# Patient Record
Sex: MALE | Race: WHITE | Employment: FULL TIME | URBAN - METROPOLITAN AREA
[De-identification: names, ages, dates, MRNs, and addresses within clinical notes are randomized per-mention and may not be internally consistent; named-entity substitution may affect disease eponyms.]

---

## 2017-01-11 ENCOUNTER — APPOINTMENT (OUTPATIENT)
Dept: GENERAL RADIOLOGY | Age: 57
End: 2017-01-11
Attending: PHYSICIAN ASSISTANT
Payer: COMMERCIAL

## 2017-01-11 ENCOUNTER — HOSPITAL ENCOUNTER (EMERGENCY)
Age: 57
Discharge: HOME | End: 2017-01-11
Attending: EMERGENCY MEDICINE
Payer: COMMERCIAL

## 2017-01-11 VITALS
HEART RATE: 88 BPM | TEMPERATURE: 98.4 F | SYSTOLIC BLOOD PRESSURE: 137 MMHG | RESPIRATION RATE: 17 BRPM | DIASTOLIC BLOOD PRESSURE: 74 MMHG | WEIGHT: 273 LBS | BODY MASS INDEX: 36.98 KG/M2 | OXYGEN SATURATION: 97 % | HEIGHT: 72 IN

## 2017-01-11 DIAGNOSIS — R07.81 RIB PAIN ON LEFT SIDE: Primary | ICD-10-CM

## 2017-01-11 LAB
ATRIAL RATE: 83 BPM
CALCULATED P AXIS, ECG09: 21 DEGREES
CALCULATED R AXIS, ECG10: -21 DEGREES
CALCULATED T AXIS, ECG11: 73 DEGREES
DIAGNOSIS, 93000: NORMAL
P-R INTERVAL, ECG05: 154 MS
Q-T INTERVAL, ECG07: 372 MS
QRS DURATION, ECG06: 94 MS
QTC CALCULATION (BEZET), ECG08: 437 MS
TROPONIN I BLD-MCNC: <0.04 NG/ML (ref 0–0.08)
VENTRICULAR RATE, ECG03: 83 BPM

## 2017-01-11 PROCEDURE — 71020 XR CHEST PA LAT: CPT

## 2017-01-11 PROCEDURE — 93005 ELECTROCARDIOGRAM TRACING: CPT

## 2017-01-11 PROCEDURE — 99283 EMERGENCY DEPT VISIT LOW MDM: CPT

## 2017-01-11 PROCEDURE — 84484 ASSAY OF TROPONIN QUANT: CPT

## 2017-01-11 RX ORDER — HYDROCODONE BITARTRATE AND ACETAMINOPHEN 5; 325 MG/1; MG/1
1 TABLET ORAL
Qty: 15 TAB | Refills: 0 | Status: SHIPPED | OUTPATIENT
Start: 2017-01-11 | End: 2017-03-02 | Stop reason: ALTCHOICE

## 2017-01-11 NOTE — ED NOTES
\"I feel pretty good as long as I don't take a deep breath or move too much. \" The patient was discharged home by Max Lopes, Henri Muna Ding in stable condition. The patient is alert and oriented, in no respiratory distress and discharge vital signs obtained. The patient's diagnosis, condition and treatment were explained. The patient expressed understanding. Prescriptions given. A discharge plan has been developed. A  was not involved in the process. Aftercare instructions were given. Pt ambulatory out of the ED.

## 2017-01-11 NOTE — ED PROVIDER NOTES
HPI Comments: 49 y.o. male with past medical history significant for HTN, arthritis, dyslipidemia, and DM presents with complaints of left rib pain. The pt states that he was dragging a dead deer out of the woods \"when I twisted my left side. \"  The pt states that nothing makes the rib pain better or worse. The pt rates the pain as a 6/10 in severity. There is no radiation of the pain. The pt describes the pain as sharp and constant. The pt denies states that he has been taking aleve at home for the pain with minimal relief of symptoms. There are no other acute medical complaints at this time. Denies fever, chills, HA, dizziness, light headedness, dyspnea, chest pain, abdominal pain, N/V/D, melena, hematochezia, loss of bowel/bladder functioning, saddle anesthesia, urinary symptoms or any other acute medical conditions. PCP: MD Perla Rendon PA-C      Patient is a 49 y.o. male presenting with rib pain. Rib Pain   Pertinent negatives include no fever, no rhinorrhea, no sore throat, no ear pain, no cough, no wheezing, no chest pain, no vomiting, no abdominal pain and no rash. Past Medical History:   Diagnosis Date    Arthritis     Chronic pain      LOWER BACK    Deviated septum     Diabetes (HCC)     GERD (gastroesophageal reflux disease)     High cholesterol     Hypertension     Unspecified sleep apnea      NO C-PAP       Past Surgical History:   Procedure Laterality Date    Hx gi       COLONOSCOPY    Hx appendectomy      Hx other surgical       WISDOM TEETH EXTRACTION    Hx other surgical       EXPLORATORY LAP    Hx heent       Tonsils and uvula removed. Family History:   Problem Relation Age of Onset    Heart Disease Mother     Dementia Mother      ALZHEIMER'S       Social History     Social History    Marital status: SINGLE     Spouse name: N/A    Number of children: N/A    Years of education: N/A     Occupational History    Not on file.      Social History Main Topics    Smoking status: Current Every Day Smoker     Packs/day: 1.00     Years: 21.00    Smokeless tobacco: Never Used    Alcohol use 1.5 oz/week     3 Cans of beer per week    Drug use: No    Sexual activity: Not on file     Other Topics Concern    Not on file     Social History Narrative         ALLERGIES: Vioxx [rofecoxib]    Review of Systems   Constitutional: Negative for activity change, appetite change, diaphoresis and fever. HENT: Negative for ear discharge, ear pain, facial swelling, rhinorrhea, sore throat, tinnitus, trouble swallowing and voice change. Eyes: Negative for photophobia, pain, discharge, redness and visual disturbance. Respiratory: Negative for cough, chest tightness, shortness of breath, wheezing and stridor. Cardiovascular: Negative for chest pain and palpitations. Gastrointestinal: Negative for abdominal pain, constipation, diarrhea, nausea and vomiting. Endocrine: Negative for polydipsia and polyuria. Genitourinary: Negative for dysuria, flank pain and hematuria. Musculoskeletal: Positive for myalgias. Negative for arthralgias and back pain. Skin: Negative for color change and rash. Neurological: Negative for dizziness, syncope, speech difficulty, light-headedness and numbness. Psychiatric/Behavioral: Negative for behavioral problems. Vitals:    01/11/17 1445   BP: (!) 141/92   Pulse: 98   Resp: 16   Temp: 98.4 °F (36.9 °C)   SpO2: 96%   Weight: 123.8 kg (273 lb)   Height: 6' (1.829 m)            Physical Exam   Constitutional: He is oriented to person, place, and time. He appears well-developed and well-nourished. No distress. HENT:   Head: Normocephalic and atraumatic. Eyes: Conjunctivae are normal. Pupils are equal, round, and reactive to light. Neck: Normal range of motion. Neck supple. Cardiovascular: Normal rate, regular rhythm and normal heart sounds.     Pulmonary/Chest: Effort normal and breath sounds normal. No respiratory distress. He has no wheezes. No Stridors or wheezes. No rales or rhonci. No accessory muscle usage. No nasal flaring. Musculoskeletal: Normal range of motion. He exhibits tenderness. There is left sided rib tenderness. No palpable deformity. Neurological: He is alert and oriented to person, place, and time. Skin: Skin is warm. He is not diaphoretic. MDM  Number of Diagnoses or Management Options  Rib pain on left side:   Diagnosis management comments: Pt presents with left sided rib pain. Pt with several cardiac risk factors. Trop and and EKG were unremarkable. Imaging studies revealed left sided pulmonary nodule. I explained to the pt that he needs to follow up with [facility] for further evaluation including CT of the nodule. Conservative treatment recommended for the pain. Reviewed treatment plan with attending and they agree. [facility]      ED Course       Procedures      ED EKG interpretation:  Rhythm: normal sinus rhythm; and regular . Rate (approx.): 83; Axis: normal; P wave: normal; QRS interval: normal ; ST/T wave: normal;  This EKG was interpreted by Amadou Guido PA-C,ED Provider.

## 2017-01-11 NOTE — DISCHARGE INSTRUCTIONS
Musculoskeletal Chest Pain: Care Instructions  Your Care Instructions  Chest pain is not always a sign that something is wrong with your heart or that you have another serious problem. The doctor thinks your chest pain is caused by strained muscles or ligaments, inflamed chest cartilage, or another problem in your chest, rather than by your heart. You may need more tests to find the cause of your chest pain. Follow-up care is a key part of your treatment and safety. Be sure to make and go to all appointments, and call your doctor if you are having problems. Its also a good idea to know your test results and keep a list of the medicines you take. How can you care for yourself at home? · Take pain medicines exactly as directed. ¨ If the doctor gave you a prescription medicine for pain, take it as prescribed. ¨ If you are not taking a prescription pain medicine, ask your doctor if you can take an over-the-counter medicine. · Rest and protect the sore area. · Stop, change, or take a break from any activity that may be causing your pain or soreness. · Put ice or a cold pack on the sore area for 10 to 20 minutes at a time. Try to do this every 1 to 2 hours for the next 3 days (when you are awake) or until the swelling goes down. Put a thin cloth between the ice and your skin. · After 2 or 3 days, apply a heating pad set on low or a warm cloth to the area that hurts. Some doctors suggest that you go back and forth between hot and cold. · Do not wrap or tape your ribs for support. This may cause you to take smaller breaths, which could increase your risk of lung problems. · Mentholated creams such as Bengay or Icy Hot may soothe sore muscles. Follow the instructions on the package. · Follow your doctor's instructions for exercising. · Gentle stretching and massage may help you get better faster. Stretch slowly to the point just before pain begins, and hold the stretch for at least 15 to 30 seconds.  Do this 3 or 4 times a day. Stretch just after you have applied heat. · As your pain gets better, slowly return to your normal activities. Any increased pain may be a sign that you need to rest a while longer. When should you call for help? Call 911 anytime you think you may need emergency care. For example, call if:  · You have chest pain or pressure. This may occur with:  ¨ Sweating. ¨ Shortness of breath. ¨ Nausea or vomiting. ¨ Pain that spreads from the chest to the neck, jaw, or one or both shoulders or arms. ¨ Dizziness or lightheadedness. ¨ A fast or uneven pulse. After calling 911, chew 1 adult-strength aspirin. Wait for an ambulance. Do not try to drive yourself. · You have sudden chest pain and shortness of breath, or you cough up blood. Call your doctor now or seek immediate medical care if:  · You have any trouble breathing. · Your chest pain gets worse. · Your chest pain occurs consistently with exercise and is relieved by rest.  Watch closely for changes in your health, and be sure to contact your doctor if:  · Your chest pain does not get better after 1 week. Where can you learn more? Go to http://laney-leidy.info/. Enter V293 in the search box to learn more about \"Musculoskeletal Chest Pain: Care Instructions. \"  Current as of: May 27, 2016  Content Version: 11.1  © 0197-8172 [employer]. Care instructions adapted under license by [employer] (which disclaims liability or warranty for this information). If you have questions about a medical condition or this instruction, always ask your healthcare professional. [place] [address] any warranty or liability for your use of this information. We hope that we have addressed all of your medical concerns. The examination and treatment you received in the Emergency Department were for an emergent problem and were not intended as complete care.  It is important that you follow up with your healthcare provider(s) for ongoing care. If your symptoms worsen or do not improve as expected, and you are unable to reach your usual health care provider(s), you should return to the Emergency Department. Today's healthcare is undergoing tremendous change, and patient satisfaction surveys are one of the many tools to assess the quality of medical care. You may receive a survey from the [employer] regarding your experience in the Emergency Department. I hope that your experience has been completely positive, particularly the medical care that I provided. As such, please participate in the survey; anything less than excellent does not meet my expectations or intentions. [facility] and [facility] participate in nationally recognized quality of care measures. If your blood pressure is greater than 120/80, as reported below, we urge that you seek medical care to address the potential of high blood pressure, commonly known as hypertension. Hypertension can be hereditary or can be caused by certain medical conditions, pain, stress, or \"white coat syndrome. \"       Please make an appointment with your health care provider(s) for follow up of your Emergency Department visit. VITALS:   Patient Vitals for the past 8 hrs:   Temp Pulse Resp BP SpO2   01/11/17 1445 98.4 °F (36.9 °C) 98 16 (!) 141/92 96 %          Thank you for allowing us to provide you with medical care today. We realize that you have many choices for your emergency care needs. Please choose us in the future for any continued health care needs. Roberto Villegas \[facility] [place] [facility]\"", [address] [place] [address].   Office: [phone]            Recent Results (from the past 24 hour(s))   EKG, 12 LEAD, INITIAL    Collection Time: 01/11/17  3:08 PM   Result Value Ref Range    Ventricular Rate 83 BPM    Atrial Rate 83 BPM    P-R Interval 154 ms    QRS Duration 94 ms    Q-T Interval 372 ms    QTC Calculation (Bezet) 437 ms    Calculated P Axis 21 degrees    Calculated R Axis -21 degrees    Calculated T Axis 73 degrees    Diagnosis       Normal sinus rhythm  Normal ECG  When compared with ECG of 03-JUN-2016 09:43,  No significant change was found     POC TROPONIN-I    Collection Time: 01/11/17  3:27 PM   Result Value Ref Range    Troponin-I (POC) <0.04 0.00 - 0.08 ng/mL       Xr Chest Pa Lat    Result Date: 1/11/2017  EXAM:  XR CHEST PA LAT INDICATION:   LEFT RIB PAIN. Pain on left side of chest since 1/2/17 COMPARISON: Chest x-ray 3/29/2014 and chest x-ray 4/25/2013. FINDINGS: PA and lateral radiographs of the chest demonstrate a small rounded nodular density in the left upper midlung zone and are otherwise clear. The cardiac and mediastinal contours and pulmonary vascularity are normal.  The chest wall structures and visualized upper abdomen show no acute findings with incidental note of degenerative spine and shoulder changes. IMPRESSION: Possible left lung pulmonary nodule. CT evaluation recommended.  23X

## 2017-01-11 NOTE — ED TRIAGE NOTES
Patient was dragging a dead deer on Jan 2nd, bent and twisted his body and felt pain in his left ribcage. 150 lb animal. Had a coughing fit at the time and it worsened the pain. Also, has had a dry cough for 2 months prior. Certain positions and coughing aggravate this pain.

## 2017-01-18 ENCOUNTER — OFFICE VISIT (OUTPATIENT)
Dept: FAMILY MEDICINE CLINIC | Age: 57
End: 2017-01-18

## 2017-01-18 VITALS
WEIGHT: 270 LBS | TEMPERATURE: 98 F | OXYGEN SATURATION: 95 % | HEIGHT: 72 IN | DIASTOLIC BLOOD PRESSURE: 78 MMHG | HEART RATE: 88 BPM | SYSTOLIC BLOOD PRESSURE: 130 MMHG | BODY MASS INDEX: 36.57 KG/M2 | RESPIRATION RATE: 16 BRPM

## 2017-01-18 DIAGNOSIS — Z76.89 ENCOUNTER TO ESTABLISH CARE WITH NEW DOCTOR: ICD-10-CM

## 2017-01-18 DIAGNOSIS — G47.33 OBSTRUCTIVE SLEEP APNEA: ICD-10-CM

## 2017-01-18 DIAGNOSIS — G47.00 INSOMNIA, UNSPECIFIED TYPE: ICD-10-CM

## 2017-01-18 DIAGNOSIS — R93.89 ABNORMAL CHEST X-RAY: Primary | ICD-10-CM

## 2017-01-18 NOTE — MR AVS SNAPSHOT
Visit Information Date & Time Provider Department Dept. Phone Encounter #  
 1/18/2017  3:30 PM Juan Reddy Matheus [phone] [id number] Follow-up Instructions Return if symptoms worsen or fail to improve. Upcoming Health Maintenance Date Due Hepatitis C Screening 1960 Pneumococcal 19-64 Medium Risk (1 of 1 - PPSV23) 7/17/1981 DTaP/Tdap/Td series (1 - Tdap) 7/17/1983 FOBT Q 1 YEAR AGE 50-75 7/17/2012 INFLUENZA AGE 9 TO ADULT 8/1/2016 Allergies as of 1/18/2017  Review Complete On: 1/18/2017 By: Denys Acosta NP Severity Noted Reaction Type Reactions Vioxx [Rofecoxib]  04/25/2013    Swelling, Other (comments) SWELLING OF UVULA, DIFFICULTY BREATHING Current Immunizations  Never Reviewed No immunizations on file. Not reviewed this visit You Were Diagnosed With   
  
 Codes Comments Abnormal chest x-ray    -  Primary ICD-10-CM: R93.8 ICD-9-CM: 793.2 Insomnia, unspecified type     ICD-10-CM: G47.00 ICD-9-CM: 780.52 Obstructive sleep apnea     ICD-10-CM: G47.33 
ICD-9-CM: 327.23 Encounter to establish care with new doctor     ICD-10-CM: Z71.89 ICD-9-CM: V65.8 Vitals BP Pulse Temp Resp Height(growth percentile) Weight(growth percentile) 130/78 88 98 °F (36.7 °C) (Oral) 16 6' (1.829 m) 270 lb (122.5 kg) SpO2 BMI Smoking Status 95% 36.62 kg/m2 Current Every Day Smoker BMI and BSA Data Body Mass Index Body Surface Area  
 36.62 kg/m 2 2.49 m 2 Preferred Pharmacy Pharmacy Name Phone [id number] [phone] , [address] [place] [address]  [phone] Your Updated Medication List  
  
   
This list is accurate as of: 1/18/17  3:52 PM.  Always use your most recent med list.  
  
  
  
  
 CRESTOR 10 mg tablet Generic drug:  rosuvastatin Take 10 mg by mouth daily. DIOVAN 160 mg tablet Generic drug:  valsartan Take 160 mg by mouth daily. HYDROcodone-acetaminophen 5-325 mg per tablet Commonly known as:  Stephanie Mura Take 1 Tab by mouth every four (4) hours as needed for Pain. Max Daily Amount: 6 Tabs. lansoprazole 30 mg capsule Commonly known as:  PREVACID Take 30 mg by mouth Daily (before breakfast). metFORMIN 500 mg tablet Commonly known as:  GLUCOPHAGE Take  by mouth two (2) times daily (with meals). We Performed the Following REFERRAL TO SLEEP STUDIES [REF99 Custom] Follow-up Instructions Return if symptoms worsen or fail to improve. To-Do List   
 01/26/2017 Imaging:  CT CHEST W WO CONT Referral Information Referral ID Referred By Referred To  
  
 [id number] YUKO, [address] [facility], [address] [place] [address] [facility] Cydney Richards 33 Phone: [phone] Fax: [phone] Visits Status Start Date End Date 1 New Request 1/18/17 1/18/18 If your referral has a status of pending review or denied, additional information will be sent to support the outcome of this decision. Referral ID Referred By Referred To  
 [id number] STEPHANIE HUNTLEY Not Available Visits Status Start Date End Date 1 New Request 1/18/17 1/18/18 If your referral has a status of pending review or denied, additional information will be sent to support the outcome of this decision. Patient Instructions CT Scan of the Chest: About This Test 
What is it? A CT (computed tomography) scan uses X-rays to make detailed pictures of your body and structures inside your body. A CT scan of the chest can give your doctor information about your lungs, your heart, and other structures in your chest. 
During the test, you will lie on a table that is attached to the CT scanner. The CT scanner is a large doughnut-shaped machine. Why is this test done? A CT scan of the chest can help find problems such as infection, lung cancer, blocked blood flow in the lung (pulmonary embolism), and other lung problems. It also can be used to see if cancer has spread into the chest from another area of the body. How can you prepare for the test? 
Talk to your doctor about all your health conditions before the test. For example, tell your doctor if: 
· You are or might be pregnant. · You are allergic to any medicines. · You have diabetes. · You take metformin. · You are breastfeeding. · You get nervous in confined spaces. You may need medicine to help you relax. What happens before the test? 
· You may have to take off jewelry. · You will take off all or most of your clothes and change into a gown. If you do leave some clothes on, make sure you take everything out of your pockets. · You may have contrast material (dye) put into your arm through a tube called an IV. Contrast material helps doctors see specific organs, blood vessels, and most tumors. What happens during the test? 
· You will lie on a table that is attached to the CT scanner. · The table will slide into the round opening of the scanner. The table will move during the scan. The scanner moves inside the doughnut-shaped casing around your body. · You will be asked to hold still during the scan. You may be asked to hold your breath for short periods. · You may be alone in the scanning room, but a technologist will be watching you through a window and talking with you during the test. 
What else should you know about the test? 
· A CT scan does not hurt. · If a dye is used, you may feel a quick sting or pinch when the IV is started. The dye may make you feel warm and flushed and give you a metallic taste in your mouth. Some people feel sick to their stomach or get a headache.  
· If you breastfeed and are concerned about whether the dye used in this test is safe, talk to your doctor. Most experts believe that very little dye passes into breast milk and even less is passed on to the baby. But if you prefer, you can store some of your breast milk ahead of time and use it for a day or two after the test. 
· There is a small chance of getting cancer from some types of CT scans. The risk is higher in children, young adults, and people who have many radiation tests. If you are concerned about this risk, talk to your doctor about the benefits and risks of a CT scan and confirm that the test is needed. How long does the test take? · The test will take about 30 to 60 minutes. Most of this time is spent getting ready for the scan. The actual test only takes a few minutes. What happens after the test? 
· You will probably be able to go home right away. · You can go back to your usual activities right away. · Drink plenty of fluids for 24 hours after the test if dye was used, unless your doctor tells you not to. When should you call for help? Watch closely for changes in your health, and be sure to contact your doctor if you have any problems. Follow-up care is a key part of your treatment and safety. Be sure to make and go to all appointments, and call your doctor if you are having problems. It's also a good idea to keep a list of the medicines you take. Ask your doctor when you can expect to have your test results. Where can you learn more? Go to http://laney-leidy.info/. Enter G007 in the search box to learn more about \"CT Scan of the Chest: About This Test.\" Current as of: February 19, 2016 Content Version: 11.1 © 3429-8762 [employer]. Care instructions adapted under license by [employer] (which disclaims liability or warranty for this information).  If you have questions about a medical condition or this instruction, always ask your healthcare professional. Antionette Stevens[employer] disclaims any warranty or liability for your use of this information. Introducing [facility]! [employer] introduces Virtual [employer]ground patient portal. Now you can access parts of your medical record, email your doctor's office, and request medication refills online. 1. In your internet browser, go to [email or web address] 2. Click on the First Time User? Click Here link in the Sign In box. You will see the New Member Sign Up page. 3. Enter your Virtual Fairground Access Code exactly as it appears below. You will not need to use this code after youve completed the sign-up process. If you do not sign up before the expiration date, you must request a new code. · Virtual [employer]ground Access Code: [email or web address] Expires: 4/11/2017  2:38 PM 
 
4. Enter the last four digits of your Social Security Number (xxxx) and Date of Birth (mm/dd/yyyy) as indicated and click Submit. You will be taken to the next sign-up page. 5. Create a Virtual [employer]ground ID. This will be your [employer]ground login ID and cannot be changed, so think of one that is secure and easy to remember. 6. Create a [employer]ground password. You can change your password at any time. 7. Enter your Password Reset Question and Answer. This can be used at a later time if you forget your password. 8. Enter your e-mail address. You will receive e-mail notification when new information is available in [address] 9. Click Sign Up. You can now view and download portions of your medical record. 10. Click the Download Summary menu link to download a portable copy of your medical information. If you have questions, please visit the Frequently Asked Questions section of the Virtual [employer]ground website. Remember, Virtual [employer]ground is NOT to be used for urgent needs. For medical emergencies, dial 911. Now available from your iPhone and Android! Please provide this summary of care documentation to your next provider. Your primary care clinician is listed as Светлана Patel. If you have any questions after today's visit, please call [phone].

## 2017-01-18 NOTE — PATIENT INSTRUCTIONS
CT Scan of the Chest: About This Test  What is it? A CT (computed tomography) scan uses X-rays to make detailed pictures of your body and structures inside your body. A CT scan of the chest can give your doctor information about your lungs, your heart, and other structures in your chest.  During the test, you will lie on a table that is attached to the CT scanner. The CT scanner is a large doughnut-shaped machine. Why is this test done? A CT scan of the chest can help find problems such as infection, lung cancer, blocked blood flow in the lung (pulmonary embolism), and other lung problems. It also can be used to see if cancer has spread into the chest from another area of the body. How can you prepare for the test?  Talk to your doctor about all your health conditions before the test. For example, tell your doctor if:  · You are or might be pregnant. · You are allergic to any medicines. · You have diabetes. · You take metformin. · You are breastfeeding. · You get nervous in confined spaces. You may need medicine to help you relax. What happens before the test?  · You may have to take off jewelry. · You will take off all or most of your clothes and change into a gown. If you do leave some clothes on, make sure you take everything out of your pockets. · You may have contrast material (dye) put into your arm through a tube called an IV. Contrast material helps doctors see specific organs, blood vessels, and most tumors. What happens during the test?  · You will lie on a table that is attached to the CT scanner. · The table will slide into the round opening of the scanner. The table will move during the scan. The scanner moves inside the doughnut-shaped casing around your body. · You will be asked to hold still during the scan. You may be asked to hold your breath for short periods.   · You may be alone in the scanning room, but a technologist will be watching you through a window and talking with you during the test.  What else should you know about the test?  · A CT scan does not hurt. · If a dye is used, you may feel a quick sting or pinch when the IV is started. The dye may make you feel warm and flushed and give you a metallic taste in your mouth. Some people feel sick to their stomach or get a headache. · If you breastfeed and are concerned about whether the dye used in this test is safe, talk to your doctor. Most experts believe that very little dye passes into breast milk and even less is passed on to the baby. But if you prefer, you can store some of your breast milk ahead of time and use it for a day or two after the test.  · There is a small chance of getting cancer from some types of CT scans. The risk is higher in children, young adults, and people who have many radiation tests. If you are concerned about this risk, talk to your doctor about the benefits and risks of a CT scan and confirm that the test is needed. How long does the test take? · The test will take about 30 to 60 minutes. Most of this time is spent getting ready for the scan. The actual test only takes a few minutes. What happens after the test?  · You will probably be able to go home right away. · You can go back to your usual activities right away. · Drink plenty of fluids for 24 hours after the test if dye was used, unless your doctor tells you not to. When should you call for help? Watch closely for changes in your health, and be sure to contact your doctor if you have any problems. Follow-up care is a key part of your treatment and safety. Be sure to make and go to all appointments, and call your doctor if you are having problems. It's also a good idea to keep a list of the medicines you take. Ask your doctor when you can expect to have your test results. Where can you learn more? Go to http://laney-leidy.info/.   Enter E357 in the search box to learn more about \"CT Scan of the Chest: About This Test.\"  Current as of: February 19, 2016  Content Version: 11.1  © 2951-9432 [facility]. Care instructions adapted under license by [employer] (which disclaims liability or warranty for this information). If you have questions about a medical condition or this instruction, always ask your healthcare professional. [place] [address] any warranty or liability for your use of this information.

## 2017-01-18 NOTE — PROGRESS NOTES
Identified pt with two pt identifiers(name and ). Chief Complaint   Patient presents with   [facility] Follow Up     [facility] 17      Pt presents today as a new patient. pt old PCP is Dr. Kin Romero  Pt was seen in ER due rib injury on 17, xray states possible lung nodule & CT recommended per ER MD.      Health Maintenance Due   Topic    Hepatitis C Screening     Pneumococcal 19-64 Medium Risk (1 of 1 - PPSV23)    DTaP/Tdap/Td series (1 - Tdap)    FOBT Q 1 YEAR AGE 50-75     INFLUENZA AGE 9 TO ADULT        Wt Readings from Last 3 Encounters:   17 270 lb (122.5 kg)   17 273 lb (123.8 kg)   16 271 lb 9 oz (123.2 kg)     Temp Readings from Last 3 Encounters:   17 98 °F (36.7 °C) (Oral)   17 98.4 °F (36.9 °C)   16 97.9 °F (36.6 °C)     BP Readings from Last 3 Encounters:   17 130/78   17 137/74   16 122/64     Pulse Readings from Last 3 Encounters:   17 88   17 88   16 73       Learning Assessment 2017   PRIMARY LEARNER Patient   BARRIERS PRIMARY LEARNER NONE   CO-LEARNER CAREGIVER No   PRIMARY LANGUAGE ENGLISH   LEARNER PREFERENCE PRIMARY DEMONSTRATION     LISTENING   ANSWERED BY patient   RELATIONSHIP SELF         Depression Screening:  :     PHQ 2 / 9, over the last two weeks 2017   Little interest or pleasure in doing things Not at all   Feeling down, depressed or hopeless Not at all   Total Score PHQ 2 0           Coordination of Care Questionnaire:  :     1) Have you been to an emergency room, urgent care clinic since your last visit? yes [facility] 17  Hospitalized since your last visit? no             2) Have you seen or consulted any other health care providers outside of [address] [place] [address] since your last visit? no  (Include any pap smears or colon screenings in this section.)    3) Do you have an Advance Directive on file?  no  Are you interested in receiving information about Advance Directives? no    Patient is accompanied by self I have received verbal consent from Leonora Robles to discuss any/all medical information while they are present in the room. Reviewed record in preparation for visit and have obtained necessary documentation. Medication reconciliation up to date and corrected with patient at this time.

## 2017-01-18 NOTE — PROGRESS NOTES
HISTORY OF PRESENT ILLNESS  Jose Andersen is a 49 y.o. male. HPI  Pt presents to [facility] care, ER follow up, rib pain and insomnia\"    Pt went to the ER on 1/11, for rib pain. On chest x-ray, there was a possible lung nodule found. Pt was asked to return to PCP office, for CT order. Pt has never had an abnormal chest x-ray previously, and is an every day smoker. PCP: Dr. Hillary Ramírez, he was at [address]  Pt is getting records sent here. Pt states that he is having a hard time sleeping at night. He will be exhausted, and then will be awake throughout the night. At work, he notices that after he eats, he will get exhausted, and pretty sure that it has something to do with his Diabetes. According to previous PCP, his diabetes is under control, so patient is a little confused as to what is going on? He was diagnosed with sleep apnea in the past, but had removal of tonsils, uvula and fatty tissue, due to this. Pt was not seen for sleep study after, so he is worried that sleep apnea could be part of his issue still? Pt does snore at night. Review of Systems   Constitutional: Negative for fever. HENT: Negative for congestion. Respiratory: Negative for cough. Gastrointestinal: Negative for abdominal pain, diarrhea, nausea and vomiting. Physical Exam   Constitutional: He is oriented to person, place, and time. He appears well-developed and well-nourished. HENT:   Head: Normocephalic and atraumatic. Neck: Normal range of motion. Neck supple. Cardiovascular: Normal rate, regular rhythm and normal heart sounds. Pulmonary/Chest: Effort normal and breath sounds normal.   Neurological: He is alert and oriented to person, place, and time. Skin: Skin is warm and dry. Psychiatric: He has a normal mood and affect. His behavior is normal.       ASSESSMENT and PLAN    ICD-10-CM ICD-9-CM    1. Abnormal chest x-ray R93.8 793.2 CT CHEST W WO CONT   2.  Insomnia, unspecified type G47.00 780.52 REFERRAL TO SLEEP STUDIES   3. Obstructive sleep apnea G47.33 327.23 REFERRAL TO SLEEP STUDIES   4. Encounter to establish care with new doctor [id number] V65.8      Educated that they will notify him within 24 hours for chest CT. Educated that we will notify him when his CT returns, and inform him of any change in plan of care at that time. Due to continued restless sleep, will refer to sleep studies. Will review records when we receive them from previous PCP. Pt informed to return to office with worsening of symptoms, or PRN with any questions or concerns. Pt verbalizes understanding of plan of care and denies further questions or concerns at this time.

## 2017-01-24 ENCOUNTER — HOSPITAL ENCOUNTER (OUTPATIENT)
Dept: CT IMAGING | Age: 57
Discharge: HOME | End: 2017-01-24
Attending: NURSE PRACTITIONER
Payer: COMMERCIAL

## 2017-01-24 DIAGNOSIS — R93.89 ABNORMAL CHEST X-RAY: ICD-10-CM

## 2017-01-24 PROCEDURE — 71250 CT THORAX DX C-: CPT

## 2017-01-25 ENCOUNTER — TELEPHONE (OUTPATIENT)
Dept: FAMILY MEDICINE CLINIC | Age: 57
End: 2017-01-25

## 2017-01-25 NOTE — TELEPHONE ENCOUNTER
----- Message from Mahesh Campbell NP sent at 1/25/2017  7:53 AM EST -----  Please call patient and let him know that his CT returned showing clear lungs. This is reassuring. It also showed fatty liver. This is something we should monitor via labs, etc.  He should return for a physical with fasting labs when he is able, to discuss this more. Thanks!

## 2017-01-25 NOTE — TELEPHONE ENCOUNTER
Advised pt CT results & recommendations per CHAD Burton.   Offered to schedule OV, Pt states he will call office to schedule complete physical with fasting labs

## 2017-03-02 ENCOUNTER — OFFICE VISIT (OUTPATIENT)
Dept: FAMILY MEDICINE CLINIC | Age: 57
End: 2017-03-02

## 2017-03-02 VITALS
SYSTOLIC BLOOD PRESSURE: 118 MMHG | HEART RATE: 93 BPM | RESPIRATION RATE: 16 BRPM | WEIGHT: 270 LBS | HEIGHT: 72 IN | BODY MASS INDEX: 36.57 KG/M2 | DIASTOLIC BLOOD PRESSURE: 72 MMHG | TEMPERATURE: 97.2 F | OXYGEN SATURATION: 96 %

## 2017-03-02 DIAGNOSIS — K76.0 HEPATIC STEATOSIS: Primary | ICD-10-CM

## 2017-03-02 DIAGNOSIS — E11.9 CONTROLLED TYPE 2 DIABETES MELLITUS WITHOUT COMPLICATION, WITHOUT LONG-TERM CURRENT USE OF INSULIN (HCC): ICD-10-CM

## 2017-03-02 DIAGNOSIS — E78.00 PURE HYPERCHOLESTEROLEMIA: ICD-10-CM

## 2017-03-02 DIAGNOSIS — R53.83 FATIGUE, UNSPECIFIED TYPE: ICD-10-CM

## 2017-03-02 PROBLEM — E78.5 HYPERLIPIDEMIA: Status: ACTIVE | Noted: 2017-03-02

## 2017-03-02 RX ORDER — CHOLECALCIFEROL (VITAMIN D3) 125 MCG
220 CAPSULE ORAL AS NEEDED
COMMUNITY
End: 2017-08-29

## 2017-03-02 NOTE — PROGRESS NOTES
Identified pt with two pt identifiers(name and ). Chief Complaint   Patient presents with   [address] [place] [address] Radiology Only     discuss CT scan    Labs     pt is not fasting today        Health Maintenance Due   Topic    Hepatitis C Screening     Pneumococcal 19-64 Medium Risk (1 of 1 - PPSV23)    DTaP/Tdap/Td series (1 - Tdap)    FOBT Q 1 YEAR AGE 50-75     INFLUENZA AGE 9 TO ADULT        Wt Readings from Last 3 Encounters:   17 270 lb (122.5 kg)   17 270 lb (122.5 kg)   17 273 lb (123.8 kg)     Temp Readings from Last 3 Encounters:   17 97.2 °F (36.2 °C) (Oral)   17 98 °F (36.7 °C) (Oral)   17 98.4 °F (36.9 °C)     BP Readings from Last 3 Encounters:   17 118/72   17 130/78   17 137/74     Pulse Readings from Last 3 Encounters:   17 93   17 88   17 88         Learning Assessment:  :     Learning Assessment 2017   PRIMARY LEARNER Patient   BARRIERS PRIMARY LEARNER NONE   CO-LEARNER CAREGIVER No   PRIMARY LANGUAGE ENGLISH   LEARNER PREFERENCE PRIMARY DEMONSTRATION     LISTENING   ANSWERED BY patient   RELATIONSHIP SELF       Depression Screening:  :     PHQ 2 / 9, over the last two weeks 3/2/2017   Little interest or pleasure in doing things Not at all   Feeling down, depressed or hopeless Not at all   Total Score PHQ 2 0           Coordination of Care Questionnaire:  :     1) Have you been to an emergency room, urgent care clinic since your last visit? no   Hospitalized since your last visit? no             2) Have you seen or consulted any other health care providers outside of [address] [place] [address] since your last visit? no  (Include any pap smears or colon screenings in this section.)    3) Do you have an Advance Directive on file?  no  Are you interested in receiving information about Advance Directives? no    Patient is accompanied by self I have received verbal consent from Leonora Robles to discuss any/all medical information while they are present in the room. Reviewed record in preparation for visit and have obtained necessary documentation. Medication reconciliation up to date and corrected with patient at this time.

## 2017-03-02 NOTE — MR AVS SNAPSHOT
Visit Information Date & Time Provider Department Dept. Phone Encounter #  
 3/2/2017  3:30 PM Juan Edward Matheus [phone] [id number] Follow-up Instructions Return if symptoms worsen or fail to improve. Upcoming Health Maintenance Date Due Hepatitis C Screening 1960 Pneumococcal 19-64 Medium Risk (1 of 1 - PPSV23) 7/17/1981 DTaP/Tdap/Td series (1 - Tdap) 7/17/1983 FOBT Q 1 YEAR AGE 50-75 7/17/2012 INFLUENZA AGE 9 TO ADULT 8/1/2016 Allergies as of 3/2/2017  Review Complete On: 3/2/2017 By: Liz Templeton NP Severity Noted Reaction Type Reactions Vioxx [Rofecoxib]  04/25/2013    Swelling, Other (comments) SWELLING OF UVULA, DIFFICULTY BREATHING Current Immunizations  Never Reviewed No immunizations on file. Not reviewed this visit You Were Diagnosed With   
  
 Codes Comments Hepatic steatosis    -  Primary ICD-10-CM: K76.0 ICD-9-CM: 571.8 Pure hypercholesterolemia     ICD-10-CM: E78.00 ICD-9-CM: 272.0 Fatigue, unspecified type     ICD-10-CM: R53.83 ICD-9-CM: 780.79 Controlled type 2 diabetes mellitus without complication, without long-term current use of insulin ([facility].)     ICD-10-CM: E11.9 ICD-9-CM: 250.00 Vitals BP  
  
  
  
  
  
 118/72 BMI and BSA Data Body Mass Index Body Surface Area  
 36.62 kg/m 2 2.49 m 2 Preferred Pharmacy Pharmacy Name Phone [id number] [address] [facility], [address] [place] [address]  [phone] Your Updated Medication List  
  
   
This list is accurate as of: 3/2/17  3:48 PM.  Always use your most recent med list.  
  
  
  
  
 ALEVE 220 mg Cap Generic drug:  naproxen sodium Take  by mouth. CRESTOR 10 mg tablet Generic drug:  rosuvastatin Take 10 mg by mouth daily. DIOVAN 160 mg tablet Generic drug:  valsartan Take 160 mg by mouth daily. lansoprazole 30 mg capsule Commonly known as:  PREVACID Take 30 mg by mouth Daily (before breakfast). metFORMIN 500 mg tablet Commonly known as:  GLUCOPHAGE Take  by mouth two (2) times daily (with meals). We Performed the Following CBC W/O DIFF [88234 CPT(R)] HEMOGLOBIN A1C WITH EAG [43776 CPT(R)] LIPID PANEL [69655 CPT(R)] METABOLIC PANEL, COMPREHENSIVE [60120 CPT(R)] THYROID CASCADE PROFILE [[id number] Custom] Follow-up Instructions Return if symptoms worsen or fail to improve. Patient Instructions Nonalcoholic Steatohepatitis (CLAY): Care Instructions Your Care Instructions Nonalcoholic steatohepatitis (CLAY) is liver inflammation. It is caused by a buildup of fat in the liver. The fat buildup is not caused by drinking alcohol. Because of the inflammation, the liver does not work as well as it should. CLAY is part of a group of liver diseases called nonalcoholic fatty liver disease. In these diseases, fat builds up in the liver and sometimes causes liver damage. This damage can get worse over time. Follow-up care is a key part of your treatment and safety. Be sure to make and go to all appointments, and call your doctor if you are having problems. It's also a good idea to know your test results and keep a list of the medicines you take. How can you care for yourself at home? · Stay at a healthy weight. Or if you need to, slowly get to a healthy weight. · Control your cholesterol. Talk to your doctor about ways to lower your cholesterol, if needed. You might try getting active, taking medicines, and making healthy changes to your diet. · Eat healthy foods. This includes fruits, vegetables, lean meats and dairy, and whole grains. · If you have diabetes, keep your blood sugar at your target level. · Get at least 30 minutes of exercise on most days of the week. Walking is a good choice.  You also may want to do other activities, such as running, swimming, cycling, or playing tennis or team sports. · Limit alcohol, or do not drink. Alcohol can damage the liver and cause health problems. When should you call for help? Call your doctor now or seek immediate medical care if: 
· You have yellowing of the skin or the whites of the eyes. (This is called jaundice.) · You have pain in the upper right part of your belly. Watch closely for changes in your health, and be sure to contact your doctor if: 
· You have swelling in your legs or belly. · Your skin itches. Where can you learn more? Go to http://laney-leidy.info/. Enter V608 in the search box to learn more about \"Nonalcoholic Steatohepatitis (CLAY): Care Instructions. \" Current as of: August 9, 2016 Content Version: 11.1 © 3822-8354 [employer]. Care instructions adapted under license by [employer] (which disclaims liability or warranty for this information). If you have questions about a medical condition or this instruction, always ask your healthcare professional. [place] [address] any warranty or liability for your use of this information. Introducing [facility]! [employer] introduces Cinegif patient portal. Now you can access parts of your medical record, email your doctor's office, and request medication refills online. 1. In your internet browser, go to [email or web address] 2. Click on the First Time User? Click Here link in the Sign In box. You will see the New Member Sign Up page. 3. Enter your Cinegif Access Code exactly as it appears below. You will not need to use this code after youve completed the sign-up process. If you do not sign up before the expiration date, you must request a new code. · Cinegif Access Code: [email or web address] Expires: 4/11/2017  2:38 PM 
 
4.  Enter the last four digits of your Social Security Number (xxxx) and Date of Birth (mm/dd/yyyy) as indicated and click Submit. You will be taken to the next sign-up page. 5. Create a [employer] ID. This will be your [employer] login ID and cannot be changed, so think of one that is secure and easy to remember. 6. Create a [employer] password. You can change your password at any time. 7. Enter your Password Reset Question and Answer. This can be used at a later time if you forget your password. 8. Enter your e-mail address. You will receive e-mail notification when new information is available in [address] 9. Click Sign Up. You can now view and download portions of your medical record. 10. Click the Download Summary menu link to download a portable copy of your medical information. If you have questions, please visit the Frequently Asked Questions section of the [employer] website. Remember, [employer] is NOT to be used for urgent needs. For medical emergencies, dial 911. Now available from your iPhone and Android! Please provide this summary of care documentation to your next provider. Your primary care clinician is listed as Светлана Patel. If you have any questions after today's visit, please call [phone].

## 2017-03-02 NOTE — PROGRESS NOTES
HISTORY OF PRESENT ILLNESS  Telma Officer is a 49 y.o. male. HPI  Pt presents to \"discuss labs\"    Pt would like to discuss the CT that was done in January, as well as talk about getting some labs done. Pt's CT was done due to hx of abnormal chest x-ray. In addition, patient is requesting order for labs at this time, although he is not fasting. Pt has not gone forth with the sleep study at this time, but \"may do at another time\". Pt states that he believes that his sleep issues are in relation to medication changes. Pt states that he was on Oxycontin, for chronic pain, for almost 10 years. Pt states that his previous PCP would not prescribe this medication, so he has been off of it for about a year. Pt states that he believes that this is the cause for his sleep disturbance. Review of Systems   Constitutional: Positive for malaise/fatigue. Negative for fever. HENT: Negative for congestion. Respiratory: Negative for cough. Gastrointestinal: Negative for diarrhea and vomiting. Physical Exam   Constitutional: He is oriented to person, place, and time. He appears well-developed and well-nourished. HENT:   Head: Normocephalic and atraumatic. Neck: Normal range of motion. Neck supple. Cardiovascular: Normal rate, regular rhythm and normal heart sounds. Pulmonary/Chest: Effort normal and breath sounds normal.   Lymphadenopathy:     He has no cervical adenopathy. Neurological: He is alert and oriented to person, place, and time. Skin: Skin is warm and dry. Psychiatric: He has a normal mood and affect. His behavior is normal.       ASSESSMENT and PLAN    ICD-10-CM ICD-9-CM    1. Hepatic steatosis K76.0 571.8 CBC W/O DIFF      METABOLIC PANEL, COMPREHENSIVE   2. Pure hypercholesterolemia E78.00 272.0 CBC W/O DIFF      METABOLIC PANEL, COMPREHENSIVE      LIPID PANEL   3. Fatigue, unspecified type R53.83 780.79 THYROID CASCADE PROFILE   4.  Controlled type 2 diabetes mellitus without complication, without long-term current use of insulin (HCC) E11.9 250.00 HEMOGLOBIN A1C WITH EAG     Educated about fatty liver, and common course of illness, progression, etc.  Educated about the importance of getting fasting labs ASAP, for review of liver and cholesterol. Educated about risks of uncontrolled sleep apnea, and importance of getting sleep study done ASAP. Pt informed to return to office with worsening of symptoms, or PRN with any questions or concerns. Pt verbalizes understanding of plan of care and denies further questions or concerns at this time.

## 2017-03-02 NOTE — PATIENT INSTRUCTIONS
Nonalcoholic Steatohepatitis (CLYA): Care Instructions  Your Care Instructions    Nonalcoholic steatohepatitis (CLYA) is liver inflammation. It is caused by a buildup of fat in the liver. The fat buildup is not caused by drinking alcohol. Because of the inflammation, the liver does not work as well as it should. CLAY is part of a group of liver diseases called nonalcoholic fatty liver disease. In these diseases, fat builds up in the liver and sometimes causes liver damage. This damage can get worse over time. Follow-up care is a key part of your treatment and safety. Be sure to make and go to all appointments, and call your doctor if you are having problems. It's also a good idea to know your test results and keep a list of the medicines you take. How can you care for yourself at home? · Stay at a healthy weight. Or if you need to, slowly get to a healthy weight. · Control your cholesterol. Talk to your doctor about ways to lower your cholesterol, if needed. You might try getting active, taking medicines, and making healthy changes to your diet. · Eat healthy foods. This includes fruits, vegetables, lean meats and dairy, and whole grains. · If you have diabetes, keep your blood sugar at your target level. · Get at least 30 minutes of exercise on most days of the week. Walking is a good choice. You also may want to do other activities, such as running, swimming, cycling, or playing tennis or team sports. · Limit alcohol, or do not drink. Alcohol can damage the liver and cause health problems. When should you call for help? Call your doctor now or seek immediate medical care if:  · You have yellowing of the skin or the whites of the eyes. (This is called jaundice.)  · You have pain in the upper right part of your belly. Watch closely for changes in your health, and be sure to contact your doctor if:  · You have swelling in your legs or belly. · Your skin itches. Where can you learn more?   Go to http://laney-leidy.info/. Enter X808 in the search box to learn more about \"Nonalcoholic Steatohepatitis (CLAY): Care Instructions. \"  Current as of: August 9, 2016  Content Version: 11.1  © 1432-3419 [employer]. Care instructions adapted under license by [employer] (which disclaims liability or warranty for this information). If you have questions about a medical condition or this instruction, always ask your healthcare professional. [place] [address] any warranty or liability for your use of this information.

## 2017-03-03 DIAGNOSIS — E11.9 CONTROLLED TYPE 2 DIABETES MELLITUS WITHOUT COMPLICATION, WITHOUT LONG-TERM CURRENT USE OF INSULIN (HCC): Primary | ICD-10-CM

## 2017-03-03 DIAGNOSIS — E78.00 PURE HYPERCHOLESTEROLEMIA: ICD-10-CM

## 2017-03-03 DIAGNOSIS — R53.83 FATIGUE, UNSPECIFIED TYPE: ICD-10-CM

## 2017-03-04 LAB
ALBUMIN SERPL-MCNC: 4.6 G/DL (ref 3.5–5.5)
ALBUMIN/GLOB SERPL: 2 {RATIO} (ref 1.1–2.5)
ALP SERPL-CCNC: 73 IU/L (ref 39–117)
ALT SERPL-CCNC: 26 IU/L (ref 0–44)
AST SERPL-CCNC: 19 IU/L (ref 0–40)
BASOPHILS # BLD AUTO: 0.1 X10E3/UL (ref 0–0.2)
BASOPHILS NFR BLD AUTO: 1 %
BILIRUB SERPL-MCNC: 0.6 MG/DL (ref 0–1.2)
BUN SERPL-MCNC: 12 MG/DL (ref 6–24)
BUN/CREAT SERPL: 14 (ref 9–20)
CALCIUM SERPL-MCNC: 9.7 MG/DL (ref 8.7–10.2)
CHLORIDE SERPL-SCNC: 98 MMOL/L (ref 96–106)
CHOLEST SERPL-MCNC: 157 MG/DL (ref 100–199)
CO2 SERPL-SCNC: 26 MMOL/L (ref 18–29)
CREAT SERPL-MCNC: 0.83 MG/DL (ref 0.76–1.27)
EOSINOPHIL # BLD AUTO: 0.2 X10E3/UL (ref 0–0.4)
EOSINOPHIL NFR BLD AUTO: 2 %
ERYTHROCYTE [DISTWIDTH] IN BLOOD BY AUTOMATED COUNT: 14.3 % (ref 12.3–15.4)
EST. AVERAGE GLUCOSE BLD GHB EST-MCNC: 143 MG/DL
GLOBULIN SER CALC-MCNC: 2.3 G/DL (ref 1.5–4.5)
GLUCOSE SERPL-MCNC: 103 MG/DL (ref 65–99)
HBA1C MFR BLD: 6.6 % (ref 4.8–5.6)
HCT VFR BLD AUTO: 47.8 % (ref 37.5–51)
HDLC SERPL-MCNC: 33 MG/DL
HGB BLD-MCNC: 15.8 G/DL (ref 12.6–17.7)
IMM GRANULOCYTES # BLD: 0 X10E3/UL (ref 0–0.1)
IMM GRANULOCYTES NFR BLD: 0 %
INTERPRETATION, 910389: NORMAL
LDLC SERPL CALC-MCNC: 85 MG/DL (ref 0–99)
LYMPHOCYTES # BLD AUTO: 3.5 X10E3/UL (ref 0.7–3.1)
LYMPHOCYTES NFR BLD AUTO: 34 %
Lab: NORMAL
MCH RBC QN AUTO: 29.8 PG (ref 26.6–33)
MCHC RBC AUTO-ENTMCNC: 33.1 G/DL (ref 31.5–35.7)
MCV RBC AUTO: 90 FL (ref 79–97)
MONOCYTES # BLD AUTO: 0.7 X10E3/UL (ref 0.1–0.9)
MONOCYTES NFR BLD AUTO: 7 %
NEUTROPHILS # BLD AUTO: 5.7 X10E3/UL (ref 1.4–7)
NEUTROPHILS NFR BLD AUTO: 56 %
PLATELET # BLD AUTO: 253 X10E3/UL (ref 150–379)
POTASSIUM SERPL-SCNC: 4.8 MMOL/L (ref 3.5–5.2)
PROT SERPL-MCNC: 6.9 G/DL (ref 6–8.5)
RBC # BLD AUTO: 5.3 X10E6/UL (ref 4.14–5.8)
SODIUM SERPL-SCNC: 140 MMOL/L (ref 134–144)
TRIGL SERPL-MCNC: 195 MG/DL (ref 0–149)
TSH SERPL DL<=0.005 MIU/L-ACNC: 1.9 UIU/ML (ref 0.45–4.5)
VLDLC SERPL CALC-MCNC: 39 MG/DL (ref 5–40)
WBC # BLD AUTO: 10.2 X10E3/UL (ref 3.4–10.8)

## 2017-03-06 ENCOUNTER — TELEPHONE (OUTPATIENT)
Dept: FAMILY MEDICINE CLINIC | Age: 57
End: 2017-03-06

## 2017-03-06 NOTE — TELEPHONE ENCOUNTER
----- Message from Katherine Evans NP sent at 3/6/2017  7:39 AM EST -----  Please call patient and let him know that his labs returned:  1. Triglycerides are still elevated, but total cholesterol and LDL are good. He should continue Crestor, and focus on diet changes, to aid in decreasing triglycerides. 2.  HgbA1C is stable at 6.6  3. Liver function is within normal limits. We will monitor this with recent finding of fatty liver. He should return in 6 months for office visit and fasting labs! Thanks!

## 2017-03-06 NOTE — PROGRESS NOTES
Please call patient and let him know that his labs returned:  1. Triglycerides are still elevated, but total cholesterol and LDL are good. He should continue Crestor, and focus on diet changes, to aid in decreasing triglycerides. 2.  HgbA1C is stable at 6.6  3. Liver function is within normal limits. We will monitor this with recent finding of fatty liver. He should return in 6 months for office visit and fasting labs! Thanks!

## 2017-03-30 ENCOUNTER — HOSPITAL ENCOUNTER (OUTPATIENT)
Dept: MRI IMAGING | Age: 57
Discharge: HOME | End: 2017-03-30
Attending: PREVENTIVE MEDICINE
Payer: COMMERCIAL

## 2017-03-30 DIAGNOSIS — M47.896 OTHER SPONDYLOSIS, LUMBAR REGION: ICD-10-CM

## 2017-03-30 PROCEDURE — 72148 MRI LUMBAR SPINE W/O DYE: CPT

## 2017-05-30 ENCOUNTER — HOSPITAL ENCOUNTER (OUTPATIENT)
Dept: GENERAL RADIOLOGY | Age: 57
Discharge: HOME | End: 2017-05-30
Attending: PREVENTIVE MEDICINE
Payer: COMMERCIAL

## 2017-05-30 DIAGNOSIS — M54.12 BRACHIAL NEURITIS OR RADICULITIS NOS: ICD-10-CM

## 2017-05-30 PROCEDURE — 72050 X-RAY EXAM NECK SPINE 4/5VWS: CPT

## 2017-05-30 PROCEDURE — 72052 X-RAY EXAM NECK SPINE 6/>VWS: CPT

## 2017-06-21 ENCOUNTER — HOSPITAL ENCOUNTER (OUTPATIENT)
Dept: MRI IMAGING | Age: 57
Discharge: HOME | End: 2017-06-21
Attending: PREVENTIVE MEDICINE
Payer: COMMERCIAL

## 2017-06-21 DIAGNOSIS — M54.12 CERVICAL RADICULOPATHY: ICD-10-CM

## 2017-06-21 PROCEDURE — 72141 MRI NECK SPINE W/O DYE: CPT

## 2017-07-10 RX ORDER — METFORMIN HYDROCHLORIDE 500 MG/1
500 TABLET ORAL 2 TIMES DAILY WITH MEALS
Qty: 180 TAB | Refills: 0 | Status: SHIPPED | OUTPATIENT
Start: 2017-07-10 | End: 2017-07-11 | Stop reason: SDUPTHER

## 2017-07-11 DIAGNOSIS — E11.9 CONTROLLED TYPE 2 DIABETES MELLITUS WITHOUT COMPLICATION, WITHOUT LONG-TERM CURRENT USE OF INSULIN (HCC): Primary | ICD-10-CM

## 2017-07-11 RX ORDER — METFORMIN HYDROCHLORIDE 500 MG/1
500 TABLET ORAL 2 TIMES DAILY WITH MEALS
Qty: 180 TAB | Refills: 0 | Status: SHIPPED | OUTPATIENT
Start: 2017-07-11 | End: 2017-10-16 | Stop reason: DRUGHIGH

## 2017-07-12 DIAGNOSIS — E11.9 CONTROLLED TYPE 2 DIABETES MELLITUS WITHOUT COMPLICATION, WITHOUT LONG-TERM CURRENT USE OF INSULIN (HCC): ICD-10-CM

## 2017-07-12 RX ORDER — METFORMIN HYDROCHLORIDE 500 MG/1
500 TABLET ORAL 2 TIMES DAILY WITH MEALS
Qty: 180 TAB | Refills: 0 | OUTPATIENT
Start: 2017-07-12

## 2017-08-14 ENCOUNTER — HOSPITAL ENCOUNTER (OUTPATIENT)
Dept: PREADMISSION TESTING | Age: 57
Discharge: HOME | End: 2017-08-14
Payer: COMMERCIAL

## 2017-08-14 VITALS
TEMPERATURE: 98.6 F | SYSTOLIC BLOOD PRESSURE: 153 MMHG | HEIGHT: 72 IN | WEIGHT: 272 LBS | DIASTOLIC BLOOD PRESSURE: 94 MMHG | HEART RATE: 94 BPM | BODY MASS INDEX: 36.84 KG/M2

## 2017-08-14 LAB
ABO + RH BLD: NORMAL
ANION GAP BLD CALC-SCNC: 10 MMOL/L (ref 5–15)
APPEARANCE UR: CLEAR
ATRIAL RATE: 87 BPM
BACTERIA URNS QL MICRO: NEGATIVE /HPF
BILIRUB UR QL: NEGATIVE
BLOOD GROUP ANTIBODIES SERPL: NORMAL
BUN SERPL-MCNC: 16 MG/DL (ref 6–20)
BUN/CREAT SERPL: 17 (ref 12–20)
CALCIUM SERPL-MCNC: 8.6 MG/DL (ref 8.5–10.1)
CALCULATED P AXIS, ECG09: 17 DEGREES
CALCULATED R AXIS, ECG10: -24 DEGREES
CALCULATED T AXIS, ECG11: 34 DEGREES
CHLORIDE SERPL-SCNC: 103 MMOL/L (ref 97–108)
CO2 SERPL-SCNC: 24 MMOL/L (ref 21–32)
COLOR UR: ABNORMAL
CREAT SERPL-MCNC: 0.92 MG/DL (ref 0.7–1.3)
DIAGNOSIS, 93000: NORMAL
EPITH CASTS URNS QL MICRO: ABNORMAL /LPF
ERYTHROCYTE [DISTWIDTH] IN BLOOD BY AUTOMATED COUNT: 14.6 % (ref 11.5–14.5)
EST. AVERAGE GLUCOSE BLD GHB EST-MCNC: 154 MG/DL
GLUCOSE SERPL-MCNC: 179 MG/DL (ref 65–100)
GLUCOSE UR STRIP.AUTO-MCNC: NEGATIVE MG/DL
HBA1C MFR BLD: 7 % (ref 4.2–6.3)
HCT VFR BLD AUTO: 46.7 % (ref 36.6–50.3)
HGB BLD-MCNC: 15.7 G/DL (ref 12.1–17)
HGB UR QL STRIP: ABNORMAL
HYALINE CASTS URNS QL MICRO: ABNORMAL /LPF (ref 0–5)
INR PPP: 0.9 (ref 0.9–1.1)
KETONES UR QL STRIP.AUTO: NEGATIVE MG/DL
LEUKOCYTE ESTERASE UR QL STRIP.AUTO: NEGATIVE
MCH RBC QN AUTO: 31.2 PG (ref 26–34)
MCHC RBC AUTO-ENTMCNC: 33.6 G/DL (ref 30–36.5)
MCV RBC AUTO: 92.7 FL (ref 80–99)
NITRITE UR QL STRIP.AUTO: NEGATIVE
P-R INTERVAL, ECG05: 156 MS
PH UR STRIP: 5 [PH] (ref 5–8)
PLATELET # BLD AUTO: 208 K/UL (ref 150–400)
POTASSIUM SERPL-SCNC: 4.3 MMOL/L (ref 3.5–5.1)
PROT UR STRIP-MCNC: NEGATIVE MG/DL
PROTHROMBIN TIME: 9.5 SEC (ref 9–11.1)
Q-T INTERVAL, ECG07: 366 MS
QRS DURATION, ECG06: 88 MS
QTC CALCULATION (BEZET), ECG08: 440 MS
RBC # BLD AUTO: 5.04 M/UL (ref 4.1–5.7)
RBC #/AREA URNS HPF: ABNORMAL /HPF (ref 0–5)
SODIUM SERPL-SCNC: 137 MMOL/L (ref 136–145)
SP GR UR REFRACTOMETRY: 1.02 (ref 1–1.03)
SPECIMEN EXP DATE BLD: NORMAL
UA: UC IF INDICATED,UAUC: ABNORMAL
UROBILINOGEN UR QL STRIP.AUTO: 0.2 EU/DL (ref 0.2–1)
VENTRICULAR RATE, ECG03: 87 BPM
WBC # BLD AUTO: 9.9 K/UL (ref 4.1–11.1)
WBC URNS QL MICRO: ABNORMAL /HPF (ref 0–4)

## 2017-08-14 PROCEDURE — 80048 BASIC METABOLIC PNL TOTAL CA: CPT | Performed by: ORTHOPAEDIC SURGERY

## 2017-08-14 PROCEDURE — 85610 PROTHROMBIN TIME: CPT | Performed by: ORTHOPAEDIC SURGERY

## 2017-08-14 PROCEDURE — 93005 ELECTROCARDIOGRAM TRACING: CPT

## 2017-08-14 PROCEDURE — 36415 COLL VENOUS BLD VENIPUNCTURE: CPT | Performed by: ORTHOPAEDIC SURGERY

## 2017-08-14 PROCEDURE — 86900 BLOOD TYPING SEROLOGIC ABO: CPT | Performed by: ORTHOPAEDIC SURGERY

## 2017-08-14 PROCEDURE — 81001 URINALYSIS AUTO W/SCOPE: CPT | Performed by: ORTHOPAEDIC SURGERY

## 2017-08-14 PROCEDURE — 83036 HEMOGLOBIN GLYCOSYLATED A1C: CPT | Performed by: ORTHOPAEDIC SURGERY

## 2017-08-14 PROCEDURE — 85027 COMPLETE CBC AUTOMATED: CPT | Performed by: ORTHOPAEDIC SURGERY

## 2017-08-14 RX ORDER — MELOXICAM 15 MG/1
15 TABLET ORAL DAILY
COMMUNITY
End: 2017-08-29

## 2017-08-14 RX ORDER — GABAPENTIN 300 MG/1
300 CAPSULE ORAL
COMMUNITY
End: 2017-10-13 | Stop reason: ALTCHOICE

## 2017-08-15 LAB
BACTERIA SPEC CULT: NORMAL
BACTERIA SPEC CULT: NORMAL
SERVICE CMNT-IMP: NORMAL

## 2017-08-21 ENCOUNTER — OFFICE VISIT (OUTPATIENT)
Dept: FAMILY MEDICINE CLINIC | Age: 57
End: 2017-08-21

## 2017-08-21 VITALS
OXYGEN SATURATION: 98 % | RESPIRATION RATE: 16 BRPM | HEART RATE: 93 BPM | TEMPERATURE: 98.9 F | BODY MASS INDEX: 37.19 KG/M2 | WEIGHT: 274.6 LBS | SYSTOLIC BLOOD PRESSURE: 130 MMHG | DIASTOLIC BLOOD PRESSURE: 79 MMHG | HEIGHT: 72 IN

## 2017-08-21 DIAGNOSIS — M54.2 NECK PAIN: ICD-10-CM

## 2017-08-21 DIAGNOSIS — Z01.818 PRE-OP EVALUATION: Primary | ICD-10-CM

## 2017-08-21 NOTE — PROGRESS NOTES
Chief Complaint   Patient presents with    Physical     pt states he is having back surgery and the surgeon said he should get a physical.  states he has already had preadmission labs and EKG etc.     \"REVIEWED RECORD IN PREPARATION FOR VISIT AND HAVE OBTAINED THE NECESSARY DOCUMENTATION\"  1. Have you been to the ER, urgent care clinic since your last visit? Hospitalized since your last visit? No    2. Have you seen or consulted any other health care providers outside of the [address] [place] [address] since your last visit? Include any pap smears or colon screening. No  Patient does not have advanced directives.

## 2017-08-21 NOTE — PATIENT INSTRUCTIONS
Learning About How to Have a Healthy Back  What causes back pain? Back pain is often caused by overuse, strain, or injury. For example, people often hurt their backs playing sports or working in the yard, being jolted in a car accident, or lifting something too heavy. Aging plays a part too. Your bones and muscles tend to lose strength as you age, which makes injury more likely. The spongy discs between the bones of the spine (vertebrae) may suffer from wear and tear and no longer provide enough cushion between the bones. A disc that bulges or breaks open (herniated disc) can press on nerves, causing back pain. In some people, back pain is the result of arthritis, broken vertebrae caused by bone loss (osteoporosis), illness, or a spine problem. Although most people have back pain at one time or another, there are steps you can take to make it less likely. How can you have a healthy back? Reduce stress on your back through good posture  Slumping or slouching alone may not cause low back pain. But after the back has been strained or injured, bad posture can make pain worse. · Sleep in a position that maintains your back's normal curves and on a mattress that feels comfortable. Sleep on your side with a pillow between your knees, or sleep on your back with a pillow under your knees. These positions can reduce strain on your back. · Stand and sit up straight. \"Good posture\" generally means your ears, shoulders, and hips are in a straight line. · If you must stand for a long time, put one foot on a stool, ledge, or box. Switch feet every now and then. · Sit in a chair that is low enough to let you place both feet flat on the floor with both knees nearly level with your hips. If your chair or desk is too high, use a footrest to raise your knees. Place a small pillow, a rolled-up towel, or a lumbar roll in the curve of your back if you need extra support.   · Try a kneeling chair, which helps tilt your hips forward. This takes pressure off your lower back. · Try sitting on an exercise ball. It can rock from side to side, which helps keep your back loose. · When driving, keep your knees nearly level with your hips. Sit straight, and drive with both hands on the steering wheel. Your arms should be in a slightly bent position. Reduce stress on your back through careful lifting  · Squat down, bending at the hips and knees only. If you need to, put one knee to the floor and extend your other knee in front of you, bent at a right angle (half kneeling). · Press your chest straight forward. This helps keep your upper back straight while keeping a slight arch in your low back. · Hold the load as close to your body as possible, at the level of your belly button (navel). · Use your feet to change direction, taking small steps. · Lead with your hips as you change direction. Keep your shoulders in line with your hips as you move. · Set down your load carefully, squatting with your knees and hips only. Exercise and stretch your back  · Do some exercise on most days of the week, if your doctor says it is okay. You can walk, run, swim, or cycle. · Stretch your back muscles. Here are a few exercises to try:  Gareld Michael on your back, and gently pull one bent knee to your chest. Put that foot back on the floor, and then pull the other knee to your chest.  ¨ Do pelvic tilts. Lie on your back with your knees bent. Tighten your stomach muscles. Pull your belly button (navel) in and up toward your ribs. You should feel like your back is pressing to the floor and your hips and pelvis are slightly lifting off the floor. Hold for 6 seconds while breathing smoothly. ¨ Sit with your back flat against a wall. · Keep your core muscles strong. The muscles of your back, belly (abdomen), and buttocks support your spine. ¨ Pull in your belly and imagine pulling your navel toward your spine. Hold this for 6 seconds, then relax.  Remember to keep breathing normally as you tense your muscles. ¨ Do curl-ups. Always do them with your knees bent. Keep your low back on the floor, and curl your shoulders toward your knees using a smooth, slow motion. Keep your arms folded across your chest. If this bothers your neck, try putting your hands behind your neck (not your head), with your elbows spread apart. ¨ Lie on your back with your knees bent and your feet flat on the floor. Tighten your belly muscles, and then push with your feet and raise your buttocks up a few inches. Hold this position 6 seconds as you continue to breathe normally, then lower yourself slowly to the floor. Repeat 8 to 12 times. ¨ If you like group exercise, try Pilates or yoga. These classes have poses that strengthen the core muscles. Lead a healthy lifestyle  · Stay at a healthy weight to avoid strain on your back. · Do not smoke. Smoking increases the risk of osteoporosis, which weakens the spine. If you need help quitting, talk to your doctor about stop-smoking programs and medicines. These can increase your chances of quitting for good. Where can you learn more? Go to http://laney-leidy.info/. Enter L315 in the search box to learn more about \"Learning About How to Have a Healthy Back. \"  Current as of: March 21, 2017  Content Version: 11.3  © 1355-5102 [employer]. Care instructions adapted under license by [employer] (which disclaims liability or warranty for this information). If you have questions about a medical condition or this instruction, always ask your healthcare professional. [place] [address] any warranty or liability for your use of this information.

## 2017-08-21 NOTE — PROGRESS NOTES
Preoperative Evaluation    Date of Exam: 2017    Sherri Cox is a 56 y.o. male (:1960) who presents for preoperative evaluation. Procedure/Surgery: C6-7 anterior cervical discectomy and fusion  Date of Procedure/Surgery: 17  Surgeon: Dr. Irby Canal: Pepe Bloom. [facility]'s  Primary Physician: Cierra Black NP  Latex Allergy: no    Problem List:     Patient Active Problem List    Diagnosis Date Noted    Hepatic steatosis 2017    Hyperlipidemia 2017    Fatigue 2017    Diabetes mellitus type 2, controlled ([facility] [address] 2017    Insomnia 2017    Abnormal chest x-ray 2017    Encounter to establish care with new doctor 2017    Obstructive sleep apnea 2013     Medical History:     Past Medical History:   Diagnosis Date    Arthritis     Chronic pain     LOWER BACK , LEFT ARM    Deviated septum     Diabetes ([facility] [address]     BORDERLINE PER PATIENT    GERD (gastroesophageal reflux disease)     High cholesterol     Hypertension     Unspecified sleep apnea     NO C-PAP     Allergies: Allergies   Allergen Reactions    Vioxx [Rofecoxib] Swelling and Other (comments)     SWELLING OF UVULA, DIFFICULTY BREATHING      Medications:     Current Outpatient Prescriptions   Medication Sig    gabapentin (NEURONTIN) 300 mg capsule Take 300 mg by mouth nightly as needed. TAKE 1 TO 2 AS NEEDED AT BEDTIME    metFORMIN (GLUCOPHAGE) 500 mg tablet Take 1 Tab by mouth two (2) times daily (with meals).  naproxen sodium (ALEVE) 220 mg cap Take 220 mg by mouth as needed.  valsartan (DIOVAN) 160 mg tablet Take 160 mg by mouth daily.  lansoprazole (PREVACID) 30 mg capsule Take 30 mg by mouth Daily (before breakfast).  rosuvastatin (CRESTOR) 10 mg tablet Take 10 mg by mouth daily.  meloxicam (MOBIC) 15 mg tablet Take 15 mg by mouth daily. No current facility-administered medications for this visit.       Surgical History:     Past Surgical History:   Procedure Laterality Date    HX APPENDECTOMY      HX GI      COLONOSCOPY    HX HEENT  2014    Tonsils and uvula removed.  HX OTHER SURGICAL      WISDOM TEETH EXTRACTION    HX OTHER SURGICAL      EXPLORATORY LAP    HX SEPTOPLASTY       Social History:     Social History     Social History    Marital status:      Spouse name: N/A    Number of children: N/A    Years of education: N/A     Social History Main Topics    Smoking status: Current Every Day Smoker     Packs/day: 1.50     Years: 21.00    Smokeless tobacco: Never Used    Alcohol use 1.5 oz/week     3 Cans of beer per week    Drug use: No    Sexual activity: Yes     Other Topics Concern    None     Social History Narrative       Recent use of: No recent use of aspirin (ASA), NSAIDS or steroids    Tetanus up to date: tetanus status unknown to the patient      Anesthesia Complications: None  History of abnormal bleeding : None  History of Blood Transfusions: no  Health Care Directive or Living Will: no    REVIEW OF SYSTEMS:  A comprehensive review of systems was negative.     EXAM:   Visit Vitals    /79 (BP 1 Location: Left arm, BP Patient Position: Sitting)    Pulse 93    Temp 98.9 °F (37.2 °C) (Oral)    Resp 16    Ht 6' (1.829 m)    Wt 274 lb 9.6 oz (124.6 kg)    SpO2 98%    BMI 37.24 kg/m2     Mental status - alert, oriented to person, place, and time  Eyes - pupils equal and reactive, extraocular eye movements intact  Ears - bilateral TM's and external ear canals normal  Nose - normal and patent, no erythema, discharge or polyps  Mouth - mucous membranes moist, pharynx normal without lesions  Neck - supple, no significant adenopathy  Lymphatics - no palpable lymphadenopathy, no hepatosplenomegaly  Chest - clear to auscultation, no wheezes, rales or rhonchi, symmetric air entry  Heart - normal rate, regular rhythm, normal S1, S2, no murmurs, rubs, clicks or gallops  Abdomen - soft, nontender, nondistended, no masses or organomegaly  Back exam - full range of motion, no tenderness, palpable spasm or pain on motion  Neurological - alert, oriented, normal speech, no focal findings or movement disorder noted  Musculoskeletal - no joint tenderness, deformity or swelling  Extremities - peripheral pulses normal, no pedal edema, no clubbing or cyanosis  Skin - normal coloration and turgor, no rashes, no suspicious skin lesions noted      DIAGNOSTICS:   1. EKG: EKG FINDINGS - normal EKG, normal sinus rhythm, unchanged from previous tracings  2. CXR: none indicated  3.  Labs:   Lab Results  Component Value Date/Time   WBC 9.9 08/14/2017 10:29 AM   HGB 15.7 08/14/2017 10:29 AM   HCT 46.7 08/14/2017 10:29 AM   PLATELET 853 02/19/6266 10:29 AM   MCV 92.7 08/14/2017 10:29 AM     Lab Results   Component Value Date/Time    INR 0.9 08/14/2017 10:29 AM    Prothrombin time 9.5 08/14/2017 10:29 AM            IMPRESSION:   Diabetes mellitus  No contraindications to planned surgeryСветлана Patel NP    8/21/2017

## 2017-08-21 NOTE — MR AVS SNAPSHOT
Visit Information Date & Time Provider Department Dept. Phone Encounter #  
 8/21/2017  3:15 PM [facility] [phone] [id number] Follow-up Instructions Return if symptoms worsen or fail to improve. Upcoming Health Maintenance Date Due Hepatitis C Screening 1960 FOOT EXAM Q1 7/17/1972 MICROALBUMIN Q1 7/17/1972 Pneumococcal 19-64 Medium Risk (1 of 1 - PPSV23) 7/17/1981 DTaP/Tdap/Td series (1 - Tdap) 7/17/1983 FOBT Q 1 YEAR AGE 50-75 7/17/2012 INFLUENZA AGE 9 TO ADULT 8/1/2017 HEMOGLOBIN A1C Q6M 2/14/2018 LIPID PANEL Q1 3/3/2018 EYE EXAM RETINAL OR DILATED Q1 3/17/2018 Allergies as of 8/21/2017  Review Complete On: 8/21/2017 By: Remedios Santana NP Severity Noted Reaction Type Reactions Vioxx [Rofecoxib]  04/25/2013    Swelling, Other (comments) SWELLING OF UVULA, DIFFICULTY BREATHING Current Immunizations  Never Reviewed No immunizations on file. Not reviewed this visit You Were Diagnosed With   
  
 Codes Comments Pre-op evaluation    -  Primary ICD-10-CM: U95.320 ICD-9-CM: V72.84 Neck pain     ICD-10-CM: M54.2 ICD-9-CM: 723.1 Vitals BP Pulse Temp Resp Height(growth percentile) Weight(growth percentile) 130/79 (BP 1 Location: Left arm, BP Patient Position: Sitting) 93 98.9 °F (37.2 °C) (Oral) 16 6' (1.829 m) 274 lb 9.6 oz (124.6 kg) SpO2 BMI Smoking Status 98% 37.24 kg/m2 Current Every Day Smoker BMI and BSA Data Body Mass Index Body Surface Area  
 37.24 kg/m 2 2.52 m 2 Preferred Pharmacy Pharmacy Name Phone [id number] [phone], [address] [place] [address]  [phone] Your Updated Medication List  
  
   
This list is accurate as of: 8/21/17  3:26 PM.  Always use your most recent med list.  
  
  
  
  
 ALEVE 220 mg Cap Generic drug:  naproxen sodium Take 220 mg by mouth as needed. CRESTOR 10 mg tablet Generic drug:  rosuvastatin Take 10 mg by mouth daily. DIOVAN 160 mg tablet Generic drug:  valsartan Take 160 mg by mouth daily. gabapentin 300 mg capsule Commonly known as:  NEURONTIN Take 300 mg by mouth nightly as needed. TAKE 1 TO 2 AS NEEDED AT BEDTIME  
  
 lansoprazole 30 mg capsule Commonly known as:  PREVACID Take 30 mg by mouth Daily (before breakfast). meloxicam 15 mg tablet Commonly known as:  MOBIC Take 15 mg by mouth daily. metFORMIN 500 mg tablet Commonly known as:  GLUCOPHAGE Take 1 Tab by mouth two (2) times daily (with meals). Follow-up Instructions Return if symptoms worsen or fail to improve. Patient Instructions Learning About How to Have a Healthy Back What causes back pain? Back pain is often caused by overuse, strain, or injury. For example, people often hurt their backs playing sports or working in the yard, being jolted in a car accident, or lifting something too heavy. Aging plays a part too. Your bones and muscles tend to lose strength as you age, which makes injury more likely. The spongy discs between the bones of the spine (vertebrae) may suffer from wear and tear and no longer provide enough cushion between the bones. A disc that bulges or breaks open (herniated disc) can press on nerves, causing back pain. In some people, back pain is the result of arthritis, broken vertebrae caused by bone loss (osteoporosis), illness, or a spine problem. Although most people have back pain at one time or another, there are steps you can take to make it less likely. How can you have a healthy back? Reduce stress on your back through good posture Slumping or slouching alone may not cause low back pain. But after the back has been strained or injured, bad posture can make pain worse.  
· Sleep in a position that maintains your back's normal curves and on a mattress that feels comfortable. Sleep on your side with a pillow between your knees, or sleep on your back with a pillow under your knees. These positions can reduce strain on your back. · Stand and sit up straight. \"Good posture\" generally means your ears, shoulders, and hips are in a straight line. · If you must stand for a long time, put one foot on a stool, ledge, or box. Switch feet every now and then. · Sit in a chair that is low enough to let you place both feet flat on the floor with both knees nearly level with your hips. If your chair or desk is too high, use a footrest to raise your knees. Place a small pillow, a rolled-up towel, or a lumbar roll in the curve of your back if you need extra support. · Try a kneeling chair, which helps tilt your hips forward. This takes pressure off your lower back. · Try sitting on an exercise ball. It can rock from side to side, which helps keep your back loose. · When driving, keep your knees nearly level with your hips. Sit straight, and drive with both hands on the steering wheel. Your arms should be in a slightly bent position. Reduce stress on your back through careful lifting · Squat down, bending at the hips and knees only. If you need to, put one knee to the floor and extend your other knee in front of you, bent at a right angle (half kneeling). · Press your chest straight forward. This helps keep your upper back straight while keeping a slight arch in your low back. · Hold the load as close to your body as possible, at the level of your belly button (navel). · Use your feet to change direction, taking small steps. · Lead with your hips as you change direction. Keep your shoulders in line with your hips as you move. · Set down your load carefully, squatting with your knees and hips only. Exercise and stretch your back · Do some exercise on most days of the week, if your doctor says it is okay. You can walk, run, swim, or cycle. · Stretch your back muscles. Here are a few exercises to try: ¨ Lie on your back, and gently pull one bent knee to your chest. Put that foot back on the floor, and then pull the other knee to your chest. 
¨ Do pelvic tilts. Lie on your back with your knees bent. Tighten your stomach muscles. Pull your belly button (navel) in and up toward your ribs. You should feel like your back is pressing to the floor and your hips and pelvis are slightly lifting off the floor. Hold for 6 seconds while breathing smoothly. ¨ Sit with your back flat against a wall. · Keep your core muscles strong. The muscles of your back, belly (abdomen), and buttocks support your spine. ¨ Pull in your belly and imagine pulling your navel toward your spine. Hold this for 6 seconds, then relax. Remember to keep breathing normally as you tense your muscles. ¨ Do curl-ups. Always do them with your knees bent. Keep your low back on the floor, and curl your shoulders toward your knees using a smooth, slow motion. Keep your arms folded across your chest. If this bothers your neck, try putting your hands behind your neck (not your head), with your elbows spread apart. ¨ Lie on your back with your knees bent and your feet flat on the floor. Tighten your belly muscles, and then push with your feet and raise your buttocks up a few inches. Hold this position 6 seconds as you continue to breathe normally, then lower yourself slowly to the floor. Repeat 8 to 12 times. ¨ If you like group exercise, try Pilates or yoga. These classes have poses that strengthen the core muscles. Lead a healthy lifestyle · Stay at a healthy weight to avoid strain on your back. · Do not smoke. Smoking increases the risk of osteoporosis, which weakens the spine. If you need help quitting, talk to your doctor about stop-smoking programs and medicines. These can increase your chances of quitting for good. Where can you learn more? Go to http://laney-leidy.info/. Enter L315 in the search box to learn more about \"Learning About How to Have a Healthy Back. \" Current as of: March 21, 2017 Content Version: 11.3 © 9607-3209 [employer]. Care instructions adapted under license by [employer] (which disclaims liability or warranty for this information). If you have questions about a medical condition or this instruction, always ask your healthcare professional. [address] any warranty or liability for your use of this information. Introducing [facility]! Dear Sabi Sender: 
Thank you for requesting a Overwatch account. Our records indicate that you already have an active Overwatch account. You can access your account anytime at [email or web address] Did you know that you can access your hospital and ER discharge instructions at any time in Overwatch? You can also review all of your test results from your hospital stay or ER visit. Additional Information If you have questions, please visit the Frequently Asked Questions section of the Overwatch website at [email or web address] Remember, Overwatch is NOT to be used for urgent needs. For medical emergencies, dial 911. Now available from your iPhone and Android! Please provide this summary of care documentation to your next provider. Your primary care clinician is listed as Светлана Patel. If you have any questions after today's visit, please call [phone].

## 2017-08-24 NOTE — H&P
Kimberly Carpenter  Location: [place] [address] [address]  Patient #: [id number]  : 1960   / Language: Titus Schaumann / Race: White  Male    History of Present Illness   The patient is a 56year old male who presents for a Recheck of Neck pain. The last clinic visit was 1 week(s) ago. Symptoms include neck pain, neck stiffness, impaired range of motion and shoulder pain. Symptoms are located in the entire neck. The pain radiates to the left shoulder, left arm, left forearm and left hand. The patient describes the pain as sharp (and stabbing). Onset was gradual 6 month(s) ago. The patient describes symptoms as moderate in severity and worsening. Symptoms are exacerbated by use of the left arm. Associated symptoms include upper extremity paresthesias and upper extremity weakness. Current treatment includes nonsteroidal anti-inflammatory drugs. The patient was previously evaluated in this clinic 1 week(s) ago. Past evaluation has included cervical spine x-rays and cervical spine MRI. Past treatment has included spinal injections. Problem List/Past Medical   Cervical disc degeneration (722.4  M50.30)    REVIEW OF SYSTEMS: Systems were reviewed by the provider.    Chronic low back pain (724.2  M54.5)    Cervical radiculitis (723.4  M54.12)    Lumbar stenosis with neurogenic claudication (724.03  M48.06)      Allergies   Vioxx *ANALGESICS - ANTI-INFLAMMATORY*      Family History   Heart disease in female family member before age 72      Social History   Alcohol use   1 time, 1-2 drinks per occasion, Drinks beer, Drinks hard liquor, Rarely drinks more than 5 drinks per occasion. Caffeine use   1-2 drinks per day, Coffee. Current work status   Full-time. Exercise   Occasionally. Marital status   . No drug use    Tobacco / smoke exposure   Family members smoke outdoors only. Tobacco use   Current every day smoker, Smokes 1.5 packs of cigarettes per day.     Medication History   Medications Reconciled     Past Surgical History   Appendectomy      Diagnostic Studies History  Cervical Spine X-ray   Date: 5/31/2017, Results: AP, lateral, and oblique x-rays of the cervical spine show a maintained cervical lordosis. There is multilevel facet arthrosis. Mild degenerative disc disease. Foraminal stenosis also noted. MRI, Cervical Spine   Date: 6/21/2017, Results: MRI of the cervical spine shows a maintained cervical lordosis. There is mild degenerative disc disease at C4-5, C5-6 and C6-7. Moderate facet arthrosis noted at C5-6 and C6-7. There is a left foraminal disc protrusion at C6-7 with mild to moderate flattening of the left side of the cord. There is severe left foraminal narrowing at C6-7. MRI, Lumbar Spine   Date: 3/30/2017, Results: MRI of the lumbar spine shows a maintained lumbar lordosis. There is mild facet arthropathy at L3-4, L4-5 and L5-S1. There is a disc bulge osteophyte complex at L5-S1 causing bilateral foraminal stenosis. Central stenosis noted at L3-4, L4-5. Other Problems   Diabetes Mellitus    Gastroesophageal Reflux Disease    High blood pressure        Review of Systems   General Present- Fatigue and Weight Gain. Not Present- Appetite Loss, Chills, Fever, Night Sweats and Weight Loss. HEENT Not Present- Decreased Hearing, Double Vision, Earache, Hoarseness, Jaundice/Yellow Eyes, Loose Teeth, Nose Bleed, Ringing in the Ears and Sore Throat. Respiratory Not Present- Bloody sputum, Chronic Cough, Difficulty Breathing, Snoring, Wakes up from Sleep Wheezing or Short of Breath and Wheezing. Musculoskeletal Present- Back Pain. Not Present- Joint Pain, Joint Stiffness and Joint Swelling. Neurological Present- Numbness and Tingling. Not Present- Fainting, Headaches, Memory Loss, Seizures, Tremor, Unsteadiness and Weakness. Psychiatric Not Present- Anxiety, Bipolar and Depression.   Endocrine Present- Excessive Thirst. Not Present- Cold Intolerance, Excessive Hunger, Excessive Urination and Heat Intolerance. Physical Exam   Neurologic  Sensory  Light Touch - Intact - Globally. Overall Assessment of Muscle Strength and Tone reveals  Upper Extremities - Right Deltoid - 5/5. Left Deltoid - 5/5. Right Bicep - 5/5. Left Bicep - 5/5. Right Tricep - 5/5. Left Tricep - 5/5. Right Wrist Extensors - 5/5. Left Wrist Extensors - 5/5. Right Wrist Flexors - 5/5. Left Wrist Flexors - 5/5. Right Intrinsics - 5/5. Left Intrinsics - 5/5. Lower Extremities - Right Iliopsoas - 5/5. Left Iliopsoas - 5/5. Right Tibialis Anterior - 5/5. Left Tibialis Anterior - 5/5. Right Gastroc-Soleus - 5/5. Left Gastroc-Soleus - 5/5. Right EHL - 5/5. Left EHL - 5/5. General Assessment of Reflexes  Right Hand - Fierro's sign is negative in the right hand. Left Hand - Fierro's sign is negative in the left hand. Right Ankle - Clonus is not present. Left Ankle - Clonus is not present. Reflexes (Dermatomes)  2/2 Normal - Left Achilles (L5-S2), Left Bicep (C5-6), Left Knee (L2-4), Left Tricep (C7-8), Left Brachioradialis (C5-6), Right Achilles (L5-S2), Right Bicep (C5-6), Right Knee (L2-4), Right Tricep (C7-8) and Right Brachioradialis (C5-6). Musculoskeletal  Global Assessment  Examination of related systems reveals - well-developed, well-nourished, in no acute distress, alert and oriented x 3 and normal coordination. Gait and Station - normal gait and station and normal posture. Right Lower Extremity - normal range of motion without pain and no instability, subluxation or laxity. Left Lower Extremity - normal range of motion without pain and no instability, subluxation or laxity. Spine/Ribs/Pelvis  Cervical Spine - Evaluation of related systems reveals - no lymphadenopathy and neurovascularly intact bilaterally. Examination of the cervical spine reveals - no swelling, edema or erythema and normal sensation. Inspection and Palpation - Tenderness - moderate and localized.   Assessment of pain reveals the following findings: - Location - left arm and fingers, left hand. ROJM - Flexion - 85 °. Right Lateral Flexion - 35 °. Left Lateral Flexion - 35 °. Extension - 70 °. Right Rotation - 80 °. Left Rotation - . Cervical Spine - Functional Testing - Foraminal Compression/Spurling's Test negative, Shoulder Depression Test negative, Upper Limb Tension Test negative. Thoracic (Dorsal) Spine - Examination of the thoracic spine reveals - no tenderness over thoracic vertebrae, no pain, normal sensation and normal thoracic spine movements. Lumbosacral Spine - Examination of the lumbosacral spine reveals - no known fractures or deformities. Inspection and Palpation - Tenderness - moderate. Assessment of pain reveals the following findings - The pain is characterized as - moderate. Location - pain refers bilaterally to buttocks and pain refers to lower back bilaterally. ROJM - Trunk Extension - 15 degrees. Lumbar Spine Flexion - . Lumbosacral Spine - Functional Testing - Babinski Test negative, Prone Knee Bending Test negative, Slump Test negative, Straight Leg Raising Test negative. Assessment & Plan   Cervical radiculitis (723.4  M54.12)  Impression: He continues with severe neck pain and left arm pain. He is a C6-7 disc herniation on the left-hand side. He's had symptoms for 6 months without relief. He has a neurologic deficit. I think is a candidate for an ACDF at C6-7. The risks and benefits were discussed at length with the patient and the patient has elected to proceed. Indications for surgery include failed conservative treatment. Alternative treatments, risks and the perioperative course were discussed with the patient. All questions were answered. The risks and benefits of the procedure were explained. Benefits include definitive diagnosis, relief of pain, elimination of deformity and improved function.  Risks of surgery including bleeding, infection, weakness, numbness, CSF leak, failure to improve symptoms, exacerbation of medical co-morbidities and even death were discussed with the patient. Current Plans  Presurgical planning was preformed with the patient today  Surgery to be scheduled - ACDF C6-7  Cervical disc degeneration (722.4  M50.30)  Treatment options were discussed with the patient in full.(V65.49)  Current Plans  Pt Education - How to access health information online: discussed with patient and provided information. Signed by Cahntelle Carrasco MD     Date of Surgery Update:  Mikey Samuel. was seen and examined. History and physical has been reviewed. The patient has been examined.  There have been no significant clinical changes since the completion of the originally dated History and Physical.    Signed By: Richa Barker PA-C     August 28, 2017 2:16 PM

## 2017-08-28 ENCOUNTER — APPOINTMENT (OUTPATIENT)
Dept: GENERAL RADIOLOGY | Age: 57
End: 2017-08-28
Attending: ORTHOPAEDIC SURGERY
Payer: COMMERCIAL

## 2017-08-28 ENCOUNTER — ANESTHESIA (OUTPATIENT)
Dept: SURGERY | Age: 57
End: 2017-08-28
Payer: COMMERCIAL

## 2017-08-28 ENCOUNTER — ANESTHESIA EVENT (OUTPATIENT)
Dept: SURGERY | Age: 57
End: 2017-08-28
Payer: COMMERCIAL

## 2017-08-28 ENCOUNTER — HOSPITAL ENCOUNTER (OUTPATIENT)
Age: 57
Setting detail: OBSERVATION
Discharge: HOME | End: 2017-08-29
Attending: ORTHOPAEDIC SURGERY | Admitting: ORTHOPAEDIC SURGERY
Payer: COMMERCIAL

## 2017-08-28 PROBLEM — M50.20 HNP (HERNIATED NUCLEUS PULPOSUS), CERVICAL: Status: ACTIVE | Noted: 2017-08-28

## 2017-08-28 LAB
GLUCOSE BLD STRIP.AUTO-MCNC: 120 MG/DL (ref 65–100)
GLUCOSE BLD STRIP.AUTO-MCNC: 155 MG/DL (ref 65–100)
GLUCOSE BLD STRIP.AUTO-MCNC: 175 MG/DL (ref 65–100)
SERVICE CMNT-IMP: ABNORMAL

## 2017-08-28 PROCEDURE — 77030032490 HC SLV COMPR SCD KNE COVD -B: Performed by: ORTHOPAEDIC SURGERY

## 2017-08-28 PROCEDURE — 74011250636 HC RX REV CODE- 250/636

## 2017-08-28 PROCEDURE — 74011250636 HC RX REV CODE- 250/636: Performed by: ORTHOPAEDIC SURGERY

## 2017-08-28 PROCEDURE — 77030002933 HC SUT MCRYL J&J -A: Performed by: ORTHOPAEDIC SURGERY

## 2017-08-28 PROCEDURE — 82962 GLUCOSE BLOOD TEST: CPT

## 2017-08-28 PROCEDURE — C1713 ANCHOR/SCREW BN/BN,TIS/BN: HCPCS | Performed by: ORTHOPAEDIC SURGERY

## 2017-08-28 PROCEDURE — 74011000250 HC RX REV CODE- 250

## 2017-08-28 PROCEDURE — 77030019908 HC STETH ESOPH SIMS -A: Performed by: ANESTHESIOLOGY

## 2017-08-28 PROCEDURE — 72040 X-RAY EXAM NECK SPINE 2-3 VW: CPT

## 2017-08-28 PROCEDURE — 76210000001 HC OR PH I REC 2.5 TO 3 HR: Performed by: ORTHOPAEDIC SURGERY

## 2017-08-28 PROCEDURE — 74011250637 HC RX REV CODE- 250/637: Performed by: ORTHOPAEDIC SURGERY

## 2017-08-28 PROCEDURE — 77030020782 HC GWN BAIR PAWS FLX 3M -B

## 2017-08-28 PROCEDURE — 74011250636 HC RX REV CODE- 250/636: Performed by: PHYSICIAN ASSISTANT

## 2017-08-28 PROCEDURE — 77030026438 HC STYL ET INTUB CARD -A: Performed by: ANESTHESIOLOGY

## 2017-08-28 PROCEDURE — 77030012406 HC DRN WND PENRS BARD -A: Performed by: ORTHOPAEDIC SURGERY

## 2017-08-28 PROCEDURE — 76010000171 HC OR TIME 2 TO 2.5 HR INTENSV-TIER 1: Performed by: ORTHOPAEDIC SURGERY

## 2017-08-28 PROCEDURE — 77030003666 HC NDL SPINAL BD -A: Performed by: ORTHOPAEDIC SURGERY

## 2017-08-28 PROCEDURE — 74011250636 HC RX REV CODE- 250/636: Performed by: ANESTHESIOLOGY

## 2017-08-28 PROCEDURE — 99218 HC RM OBSERVATION: CPT

## 2017-08-28 PROCEDURE — 77030011267 HC ELECTRD BLD COVD -A: Performed by: ORTHOPAEDIC SURGERY

## 2017-08-28 PROCEDURE — 77030004391 HC BUR FLUT MEDT -C: Performed by: ORTHOPAEDIC SURGERY

## 2017-08-28 PROCEDURE — 77030008684 HC TU ET CUF COVD -B: Performed by: ANESTHESIOLOGY

## 2017-08-28 PROCEDURE — 76000 FLUOROSCOPY <1 HR PHYS/QHP: CPT

## 2017-08-28 PROCEDURE — 77030018836 HC SOL IRR NACL ICUM -A: Performed by: ORTHOPAEDIC SURGERY

## 2017-08-28 PROCEDURE — 77030013567 HC DRN WND RESERV BARD -A: Performed by: ORTHOPAEDIC SURGERY

## 2017-08-28 PROCEDURE — 77030034475 HC MISC IMPL SPN: Performed by: ORTHOPAEDIC SURGERY

## 2017-08-28 PROCEDURE — 74011000272 HC RX REV CODE- 272: Performed by: ORTHOPAEDIC SURGERY

## 2017-08-28 PROCEDURE — 77030020268 HC MISC GENERAL SUPPLY: Performed by: ORTHOPAEDIC SURGERY

## 2017-08-28 PROCEDURE — 74011000250 HC RX REV CODE- 250: Performed by: ORTHOPAEDIC SURGERY

## 2017-08-28 PROCEDURE — 77030012893

## 2017-08-28 PROCEDURE — 77030031139 HC SUT VCRL2 J&J -A: Performed by: ORTHOPAEDIC SURGERY

## 2017-08-28 PROCEDURE — 77030020274 HC MISC IMPL ORTHOPEDIC: Performed by: ORTHOPAEDIC SURGERY

## 2017-08-28 PROCEDURE — 77030029099 HC BN WAX SSPC -A: Performed by: ORTHOPAEDIC SURGERY

## 2017-08-28 PROCEDURE — 76060000035 HC ANESTHESIA 2 TO 2.5 HR: Performed by: ORTHOPAEDIC SURGERY

## 2017-08-28 DEVICE — PLATE SPNL L22MM BILAT ANT CERV TI 1 LEV CONSTRN LO PROF: Type: IMPLANTABLE DEVICE | Site: SPINE CERVICAL | Status: FUNCTIONAL

## 2017-08-28 DEVICE — GRAFT BNE SUB SM CANC FRZN MORSELIZED W/ VIABLE CELL: Type: IMPLANTABLE DEVICE | Site: SPINE CERVICAL | Status: FUNCTIONAL

## 2017-08-28 DEVICE — SCREW SPNL L16MM DIA4MM CERV SELF STARTING CONSTRN LO PROF: Type: IMPLANTABLE DEVICE | Site: SPINE CERVICAL | Status: FUNCTIONAL

## 2017-08-28 RX ORDER — NALOXONE HYDROCHLORIDE 0.4 MG/ML
0.4 INJECTION, SOLUTION INTRAMUSCULAR; INTRAVENOUS; SUBCUTANEOUS AS NEEDED
Status: DISCONTINUED | OUTPATIENT
Start: 2017-08-28 | End: 2017-08-29 | Stop reason: HOSPADM

## 2017-08-28 RX ORDER — AMOXICILLIN 250 MG
1 CAPSULE ORAL 2 TIMES DAILY
Status: DISCONTINUED | OUTPATIENT
Start: 2017-08-28 | End: 2017-08-29 | Stop reason: HOSPADM

## 2017-08-28 RX ORDER — MORPHINE SULFATE 2 MG/ML
2 INJECTION, SOLUTION INTRAMUSCULAR; INTRAVENOUS
Status: DISCONTINUED | OUTPATIENT
Start: 2017-08-28 | End: 2017-08-28 | Stop reason: HOSPADM

## 2017-08-28 RX ORDER — SODIUM CHLORIDE 0.9 % (FLUSH) 0.9 %
5-10 SYRINGE (ML) INJECTION EVERY 8 HOURS
Status: DISCONTINUED | OUTPATIENT
Start: 2017-08-28 | End: 2017-08-28 | Stop reason: HOSPADM

## 2017-08-28 RX ORDER — DIAZEPAM 5 MG/1
5 TABLET ORAL ONCE
Status: COMPLETED | OUTPATIENT
Start: 2017-08-28 | End: 2017-08-28

## 2017-08-28 RX ORDER — IBUPROFEN 200 MG
1 TABLET ORAL DAILY
Status: DISCONTINUED | OUTPATIENT
Start: 2017-08-29 | End: 2017-08-29 | Stop reason: HOSPADM

## 2017-08-28 RX ORDER — SODIUM CHLORIDE 0.9 % (FLUSH) 0.9 %
5-10 SYRINGE (ML) INJECTION AS NEEDED
Status: DISCONTINUED | OUTPATIENT
Start: 2017-08-28 | End: 2017-08-28 | Stop reason: HOSPADM

## 2017-08-28 RX ORDER — MIDAZOLAM HYDROCHLORIDE 1 MG/ML
0.5 INJECTION, SOLUTION INTRAMUSCULAR; INTRAVENOUS
Status: DISCONTINUED | OUTPATIENT
Start: 2017-08-28 | End: 2017-08-28 | Stop reason: HOSPADM

## 2017-08-28 RX ORDER — FENTANYL CITRATE 50 UG/ML
25 INJECTION, SOLUTION INTRAMUSCULAR; INTRAVENOUS
Status: DISCONTINUED | OUTPATIENT
Start: 2017-08-28 | End: 2017-08-28 | Stop reason: HOSPADM

## 2017-08-28 RX ORDER — LANSOPRAZOLE 30 MG/1
30 CAPSULE, DELAYED RELEASE ORAL
Status: DISCONTINUED | OUTPATIENT
Start: 2017-08-29 | End: 2017-08-28 | Stop reason: CLARIF

## 2017-08-28 RX ORDER — DEXAMETHASONE SODIUM PHOSPHATE 4 MG/ML
10 INJECTION, SOLUTION INTRA-ARTICULAR; INTRALESIONAL; INTRAMUSCULAR; INTRAVENOUS; SOFT TISSUE EVERY 6 HOURS
Status: DISCONTINUED | OUTPATIENT
Start: 2017-08-28 | End: 2017-08-29 | Stop reason: HOSPADM

## 2017-08-28 RX ORDER — GABAPENTIN 300 MG/1
300 CAPSULE ORAL
Status: DISCONTINUED | OUTPATIENT
Start: 2017-08-28 | End: 2017-08-29 | Stop reason: HOSPADM

## 2017-08-28 RX ORDER — HYDROMORPHONE HYDROCHLORIDE 1 MG/ML
0.2 INJECTION, SOLUTION INTRAMUSCULAR; INTRAVENOUS; SUBCUTANEOUS
Status: DISCONTINUED | OUTPATIENT
Start: 2017-08-28 | End: 2017-08-28 | Stop reason: HOSPADM

## 2017-08-28 RX ORDER — ROPIVACAINE HYDROCHLORIDE 5 MG/ML
30 INJECTION, SOLUTION EPIDURAL; INFILTRATION; PERINEURAL ONCE
Status: DISCONTINUED | OUTPATIENT
Start: 2017-08-28 | End: 2017-08-28 | Stop reason: HOSPADM

## 2017-08-28 RX ORDER — FENTANYL CITRATE 50 UG/ML
50 INJECTION, SOLUTION INTRAMUSCULAR; INTRAVENOUS AS NEEDED
Status: DISCONTINUED | OUTPATIENT
Start: 2017-08-28 | End: 2017-08-28 | Stop reason: HOSPADM

## 2017-08-28 RX ORDER — GLYCOPYRROLATE 0.2 MG/ML
INJECTION INTRAMUSCULAR; INTRAVENOUS AS NEEDED
Status: DISCONTINUED | OUTPATIENT
Start: 2017-08-28 | End: 2017-08-28 | Stop reason: HOSPADM

## 2017-08-28 RX ORDER — OXYCODONE HYDROCHLORIDE 5 MG/1
5 TABLET ORAL
Status: DISCONTINUED | OUTPATIENT
Start: 2017-08-28 | End: 2017-08-29 | Stop reason: HOSPADM

## 2017-08-28 RX ORDER — ROSUVASTATIN CALCIUM 10 MG/1
10 TABLET, COATED ORAL DAILY
Status: DISCONTINUED | OUTPATIENT
Start: 2017-08-29 | End: 2017-08-29 | Stop reason: HOSPADM

## 2017-08-28 RX ORDER — ACETAMINOPHEN 10 MG/ML
INJECTION, SOLUTION INTRAVENOUS AS NEEDED
Status: DISCONTINUED | OUTPATIENT
Start: 2017-08-28 | End: 2017-08-28 | Stop reason: HOSPADM

## 2017-08-28 RX ORDER — MIDAZOLAM HYDROCHLORIDE 1 MG/ML
1 INJECTION, SOLUTION INTRAMUSCULAR; INTRAVENOUS AS NEEDED
Status: DISCONTINUED | OUTPATIENT
Start: 2017-08-28 | End: 2017-08-28 | Stop reason: HOSPADM

## 2017-08-28 RX ORDER — DIPHENHYDRAMINE HYDROCHLORIDE 50 MG/ML
12.5 INJECTION, SOLUTION INTRAMUSCULAR; INTRAVENOUS AS NEEDED
Status: DISCONTINUED | OUTPATIENT
Start: 2017-08-28 | End: 2017-08-28 | Stop reason: HOSPADM

## 2017-08-28 RX ORDER — SODIUM CHLORIDE 0.9 % (FLUSH) 0.9 %
5-10 SYRINGE (ML) INJECTION EVERY 8 HOURS
Status: DISCONTINUED | OUTPATIENT
Start: 2017-08-29 | End: 2017-08-29 | Stop reason: HOSPADM

## 2017-08-28 RX ORDER — SODIUM CHLORIDE, SODIUM LACTATE, POTASSIUM CHLORIDE, CALCIUM CHLORIDE 600; 310; 30; 20 MG/100ML; MG/100ML; MG/100ML; MG/100ML
75 INJECTION, SOLUTION INTRAVENOUS CONTINUOUS
Status: DISCONTINUED | OUTPATIENT
Start: 2017-08-28 | End: 2017-08-28 | Stop reason: HOSPADM

## 2017-08-28 RX ORDER — INSULIN LISPRO 100 [IU]/ML
INJECTION, SOLUTION INTRAVENOUS; SUBCUTANEOUS
Status: DISCONTINUED | OUTPATIENT
Start: 2017-08-28 | End: 2017-08-29 | Stop reason: HOSPADM

## 2017-08-28 RX ORDER — SODIUM CHLORIDE, SODIUM LACTATE, POTASSIUM CHLORIDE, CALCIUM CHLORIDE 600; 310; 30; 20 MG/100ML; MG/100ML; MG/100ML; MG/100ML
125 INJECTION, SOLUTION INTRAVENOUS CONTINUOUS
Status: DISCONTINUED | OUTPATIENT
Start: 2017-08-28 | End: 2017-08-28 | Stop reason: HOSPADM

## 2017-08-28 RX ORDER — ONDANSETRON 2 MG/ML
4 INJECTION INTRAMUSCULAR; INTRAVENOUS AS NEEDED
Status: DISCONTINUED | OUTPATIENT
Start: 2017-08-28 | End: 2017-08-28 | Stop reason: HOSPADM

## 2017-08-28 RX ORDER — ONDANSETRON 2 MG/ML
4 INJECTION INTRAMUSCULAR; INTRAVENOUS
Status: DISCONTINUED | OUTPATIENT
Start: 2017-08-28 | End: 2017-08-29 | Stop reason: HOSPADM

## 2017-08-28 RX ORDER — SODIUM CHLORIDE 0.9 % (FLUSH) 0.9 %
5-10 SYRINGE (ML) INJECTION AS NEEDED
Status: DISCONTINUED | OUTPATIENT
Start: 2017-08-28 | End: 2017-08-29 | Stop reason: HOSPADM

## 2017-08-28 RX ORDER — VALSARTAN 80 MG/1
160 TABLET ORAL DAILY
Status: DISCONTINUED | OUTPATIENT
Start: 2017-08-29 | End: 2017-08-29 | Stop reason: HOSPADM

## 2017-08-28 RX ORDER — DEXTROSE 50 % IN WATER (D50W) INTRAVENOUS SYRINGE
12.5-25 AS NEEDED
Status: DISCONTINUED | OUTPATIENT
Start: 2017-08-28 | End: 2017-08-29 | Stop reason: HOSPADM

## 2017-08-28 RX ORDER — SODIUM CHLORIDE 9 MG/ML
25 INJECTION, SOLUTION INTRAVENOUS CONTINUOUS
Status: DISCONTINUED | OUTPATIENT
Start: 2017-08-28 | End: 2017-08-28 | Stop reason: HOSPADM

## 2017-08-28 RX ORDER — PANTOPRAZOLE SODIUM 40 MG/1
40 TABLET, DELAYED RELEASE ORAL
Status: DISCONTINUED | OUTPATIENT
Start: 2017-08-29 | End: 2017-08-29 | Stop reason: HOSPADM

## 2017-08-28 RX ORDER — FENTANYL CITRATE 50 UG/ML
INJECTION, SOLUTION INTRAMUSCULAR; INTRAVENOUS AS NEEDED
Status: DISCONTINUED | OUTPATIENT
Start: 2017-08-28 | End: 2017-08-28 | Stop reason: HOSPADM

## 2017-08-28 RX ORDER — FENTANYL CITRATE 50 UG/ML
INJECTION, SOLUTION INTRAMUSCULAR; INTRAVENOUS
Status: COMPLETED
Start: 2017-08-28 | End: 2017-08-28

## 2017-08-28 RX ORDER — PROPOFOL 10 MG/ML
INJECTION, EMULSION INTRAVENOUS AS NEEDED
Status: DISCONTINUED | OUTPATIENT
Start: 2017-08-28 | End: 2017-08-28 | Stop reason: HOSPADM

## 2017-08-28 RX ORDER — MAGNESIUM SULFATE 100 %
4 CRYSTALS MISCELLANEOUS AS NEEDED
Status: DISCONTINUED | OUTPATIENT
Start: 2017-08-28 | End: 2017-08-29 | Stop reason: HOSPADM

## 2017-08-28 RX ORDER — OXYCODONE HYDROCHLORIDE 5 MG/1
10 TABLET ORAL
Status: DISCONTINUED | OUTPATIENT
Start: 2017-08-28 | End: 2017-08-29 | Stop reason: HOSPADM

## 2017-08-28 RX ORDER — ACETAMINOPHEN 325 MG/1
650 TABLET ORAL EVERY 6 HOURS
Status: DISCONTINUED | OUTPATIENT
Start: 2017-08-28 | End: 2017-08-29 | Stop reason: HOSPADM

## 2017-08-28 RX ORDER — NEOSTIGMINE METHYLSULFATE 1 MG/ML
INJECTION INTRAVENOUS AS NEEDED
Status: DISCONTINUED | OUTPATIENT
Start: 2017-08-28 | End: 2017-08-28 | Stop reason: HOSPADM

## 2017-08-28 RX ORDER — SUCCINYLCHOLINE CHLORIDE 20 MG/ML
INJECTION INTRAMUSCULAR; INTRAVENOUS AS NEEDED
Status: DISCONTINUED | OUTPATIENT
Start: 2017-08-28 | End: 2017-08-28 | Stop reason: HOSPADM

## 2017-08-28 RX ORDER — LIDOCAINE HYDROCHLORIDE 20 MG/ML
INJECTION, SOLUTION EPIDURAL; INFILTRATION; INTRACAUDAL; PERINEURAL AS NEEDED
Status: DISCONTINUED | OUTPATIENT
Start: 2017-08-28 | End: 2017-08-28 | Stop reason: HOSPADM

## 2017-08-28 RX ORDER — PROPOFOL 10 MG/ML
INJECTION, EMULSION INTRAVENOUS
Status: DISCONTINUED | OUTPATIENT
Start: 2017-08-28 | End: 2017-08-28 | Stop reason: HOSPADM

## 2017-08-28 RX ORDER — FACIAL-BODY WIPES
10 EACH TOPICAL DAILY PRN
Status: DISCONTINUED | OUTPATIENT
Start: 2017-08-30 | End: 2017-08-29 | Stop reason: HOSPADM

## 2017-08-28 RX ORDER — ONDANSETRON 2 MG/ML
INJECTION INTRAMUSCULAR; INTRAVENOUS AS NEEDED
Status: DISCONTINUED | OUTPATIENT
Start: 2017-08-28 | End: 2017-08-28 | Stop reason: HOSPADM

## 2017-08-28 RX ORDER — MIDAZOLAM HYDROCHLORIDE 1 MG/ML
INJECTION, SOLUTION INTRAMUSCULAR; INTRAVENOUS AS NEEDED
Status: DISCONTINUED | OUTPATIENT
Start: 2017-08-28 | End: 2017-08-28 | Stop reason: HOSPADM

## 2017-08-28 RX ORDER — SODIUM CHLORIDE 9 MG/ML
125 INJECTION, SOLUTION INTRAVENOUS CONTINUOUS
Status: DISCONTINUED | OUTPATIENT
Start: 2017-08-28 | End: 2017-08-29 | Stop reason: HOSPADM

## 2017-08-28 RX ORDER — POLYETHYLENE GLYCOL 3350 17 G/17G
17 POWDER, FOR SOLUTION ORAL DAILY
Status: DISCONTINUED | OUTPATIENT
Start: 2017-08-29 | End: 2017-08-29 | Stop reason: HOSPADM

## 2017-08-28 RX ORDER — ROCURONIUM BROMIDE 10 MG/ML
INJECTION, SOLUTION INTRAVENOUS AS NEEDED
Status: DISCONTINUED | OUTPATIENT
Start: 2017-08-28 | End: 2017-08-28 | Stop reason: HOSPADM

## 2017-08-28 RX ORDER — DIPHENHYDRAMINE HYDROCHLORIDE 50 MG/ML
12.5 INJECTION, SOLUTION INTRAMUSCULAR; INTRAVENOUS
Status: DISCONTINUED | OUTPATIENT
Start: 2017-08-28 | End: 2017-08-29 | Stop reason: HOSPADM

## 2017-08-28 RX ORDER — DEXMEDETOMIDINE HYDROCHLORIDE 4 UG/ML
INJECTION, SOLUTION INTRAVENOUS AS NEEDED
Status: DISCONTINUED | OUTPATIENT
Start: 2017-08-28 | End: 2017-08-28 | Stop reason: HOSPADM

## 2017-08-28 RX ORDER — MORPHINE SULFATE 2 MG/ML
2 INJECTION, SOLUTION INTRAMUSCULAR; INTRAVENOUS
Status: DISCONTINUED | OUTPATIENT
Start: 2017-08-28 | End: 2017-08-29 | Stop reason: HOSPADM

## 2017-08-28 RX ORDER — METFORMIN HYDROCHLORIDE 500 MG/1
500 TABLET ORAL 2 TIMES DAILY WITH MEALS
Status: DISCONTINUED | OUTPATIENT
Start: 2017-08-29 | End: 2017-08-29 | Stop reason: HOSPADM

## 2017-08-28 RX ORDER — LIDOCAINE HYDROCHLORIDE 10 MG/ML
0.1 INJECTION, SOLUTION EPIDURAL; INFILTRATION; INTRACAUDAL; PERINEURAL AS NEEDED
Status: DISCONTINUED | OUTPATIENT
Start: 2017-08-28 | End: 2017-08-28 | Stop reason: HOSPADM

## 2017-08-28 RX ORDER — CYCLOBENZAPRINE HCL 10 MG
10 TABLET ORAL
Status: DISCONTINUED | OUTPATIENT
Start: 2017-08-28 | End: 2017-08-29 | Stop reason: HOSPADM

## 2017-08-28 RX ADMIN — GLYCOPYRROLATE 0.4 MG: 0.2 INJECTION INTRAMUSCULAR; INTRAVENOUS at 15:52

## 2017-08-28 RX ADMIN — MIDAZOLAM HYDROCHLORIDE 1 MG: 1 INJECTION, SOLUTION INTRAMUSCULAR; INTRAVENOUS at 14:18

## 2017-08-28 RX ADMIN — PROPOFOL 50 MG: 10 INJECTION, EMULSION INTRAVENOUS at 14:20

## 2017-08-28 RX ADMIN — DIAZEPAM 5 MG: 5 TABLET ORAL at 22:28

## 2017-08-28 RX ADMIN — ROCURONIUM BROMIDE 20 MG: 10 INJECTION, SOLUTION INTRAVENOUS at 14:45

## 2017-08-28 RX ADMIN — MORPHINE SULFATE 2 MG: 2 INJECTION, SOLUTION INTRAMUSCULAR; INTRAVENOUS at 17:50

## 2017-08-28 RX ADMIN — FENTANYL CITRATE 25 MCG: 50 INJECTION, SOLUTION INTRAMUSCULAR; INTRAVENOUS at 16:20

## 2017-08-28 RX ADMIN — DEXAMETHASONE SODIUM PHOSPHATE 10 MG: 4 INJECTION INTRA-ARTICULAR; INTRALESIONAL; INTRAMUSCULAR; INTRAVENOUS; SOFT TISSUE at 23:43

## 2017-08-28 RX ADMIN — DEXMEDETOMIDINE HYDROCHLORIDE 8 MCG: 4 INJECTION, SOLUTION INTRAVENOUS at 14:54

## 2017-08-28 RX ADMIN — MORPHINE SULFATE 2 MG: 2 INJECTION, SOLUTION INTRAMUSCULAR; INTRAVENOUS at 17:55

## 2017-08-28 RX ADMIN — LIDOCAINE HYDROCHLORIDE 80 MG: 20 INJECTION, SOLUTION EPIDURAL; INFILTRATION; INTRACAUDAL; PERINEURAL at 14:18

## 2017-08-28 RX ADMIN — MIDAZOLAM HYDROCHLORIDE 2 MG: 1 INJECTION, SOLUTION INTRAMUSCULAR; INTRAVENOUS at 14:02

## 2017-08-28 RX ADMIN — ONDANSETRON 4 MG: 2 INJECTION INTRAMUSCULAR; INTRAVENOUS at 15:55

## 2017-08-28 RX ADMIN — NEOSTIGMINE METHYLSULFATE 3 MG: 1 INJECTION INTRAVENOUS at 15:52

## 2017-08-28 RX ADMIN — FENTANYL CITRATE 25 MCG: 50 INJECTION, SOLUTION INTRAMUSCULAR; INTRAVENOUS at 16:30

## 2017-08-28 RX ADMIN — MEPERIDINE HYDROCHLORIDE 12.5 MG: 25 INJECTION INTRAMUSCULAR; INTRAVENOUS; SUBCUTANEOUS at 16:45

## 2017-08-28 RX ADMIN — GLYCOPYRROLATE 0.2 MG: 0.2 INJECTION INTRAMUSCULAR; INTRAVENOUS at 14:53

## 2017-08-28 RX ADMIN — FENTANYL CITRATE 25 MCG: 50 INJECTION, SOLUTION INTRAMUSCULAR; INTRAVENOUS at 16:35

## 2017-08-28 RX ADMIN — HYDROMORPHONE HYDROCHLORIDE 1 MG: 1 INJECTION, SOLUTION INTRAMUSCULAR; INTRAVENOUS; SUBCUTANEOUS at 18:05

## 2017-08-28 RX ADMIN — HYDROMORPHONE HYDROCHLORIDE 0.2 MG: 1 INJECTION, SOLUTION INTRAMUSCULAR; INTRAVENOUS; SUBCUTANEOUS at 17:40

## 2017-08-28 RX ADMIN — SODIUM CHLORIDE, SODIUM LACTATE, POTASSIUM CHLORIDE, AND CALCIUM CHLORIDE: 600; 310; 30; 20 INJECTION, SOLUTION INTRAVENOUS at 16:08

## 2017-08-28 RX ADMIN — HYDROMORPHONE HYDROCHLORIDE 0.2 MG: 1 INJECTION, SOLUTION INTRAMUSCULAR; INTRAVENOUS; SUBCUTANEOUS at 17:10

## 2017-08-28 RX ADMIN — HYDROMORPHONE HYDROCHLORIDE 0.2 MG: 1 INJECTION, SOLUTION INTRAMUSCULAR; INTRAVENOUS; SUBCUTANEOUS at 16:55

## 2017-08-28 RX ADMIN — CEFAZOLIN 3 G: 1 INJECTION, POWDER, FOR SOLUTION INTRAMUSCULAR; INTRAVENOUS; PARENTERAL at 20:04

## 2017-08-28 RX ADMIN — MORPHINE SULFATE 2 MG: 2 INJECTION, SOLUTION INTRAMUSCULAR; INTRAVENOUS at 20:04

## 2017-08-28 RX ADMIN — ROCURONIUM BROMIDE 10 MG: 10 INJECTION, SOLUTION INTRAVENOUS at 14:18

## 2017-08-28 RX ADMIN — MEPERIDINE HYDROCHLORIDE 12.5 MG: 25 INJECTION INTRAMUSCULAR; INTRAVENOUS; SUBCUTANEOUS at 16:30

## 2017-08-28 RX ADMIN — FENTANYL CITRATE 100 MCG: 50 INJECTION, SOLUTION INTRAMUSCULAR; INTRAVENOUS at 14:18

## 2017-08-28 RX ADMIN — CEFAZOLIN 3 G: 1 INJECTION, POWDER, FOR SOLUTION INTRAMUSCULAR; INTRAVENOUS; PARENTERAL at 14:25

## 2017-08-28 RX ADMIN — PROPOFOL 100 MG: 10 INJECTION, EMULSION INTRAVENOUS at 14:18

## 2017-08-28 RX ADMIN — FENTANYL CITRATE 25 MCG: 50 INJECTION, SOLUTION INTRAMUSCULAR; INTRAVENOUS at 16:25

## 2017-08-28 RX ADMIN — ACETAMINOPHEN 1000 MG: 10 INJECTION, SOLUTION INTRAVENOUS at 13:55

## 2017-08-28 RX ADMIN — PROPOFOL 100 MCG/KG/MIN: 10 INJECTION, EMULSION INTRAVENOUS at 14:37

## 2017-08-28 RX ADMIN — DEXAMETHASONE SODIUM PHOSPHATE 10 MG: 4 INJECTION INTRA-ARTICULAR; INTRALESIONAL; INTRAMUSCULAR; INTRAVENOUS; SOFT TISSUE at 16:30

## 2017-08-28 RX ADMIN — DEXMEDETOMIDINE HYDROCHLORIDE 4 MCG: 4 INJECTION, SOLUTION INTRAVENOUS at 15:48

## 2017-08-28 RX ADMIN — HYDROMORPHONE HYDROCHLORIDE 0.2 MG: 1 INJECTION, SOLUTION INTRAMUSCULAR; INTRAVENOUS; SUBCUTANEOUS at 17:25

## 2017-08-28 RX ADMIN — DEXMEDETOMIDINE HYDROCHLORIDE 8 MCG: 4 INJECTION, SOLUTION INTRAVENOUS at 15:42

## 2017-08-28 RX ADMIN — ONDANSETRON 4 MG: 2 INJECTION INTRAMUSCULAR; INTRAVENOUS at 14:56

## 2017-08-28 RX ADMIN — MORPHINE SULFATE 2 MG: 2 INJECTION, SOLUTION INTRAMUSCULAR; INTRAVENOUS at 17:45

## 2017-08-28 RX ADMIN — SUCCINYLCHOLINE CHLORIDE 200 MG: 20 INJECTION INTRAMUSCULAR; INTRAVENOUS at 14:19

## 2017-08-28 RX ADMIN — MIDAZOLAM HYDROCHLORIDE 2 MG: 1 INJECTION, SOLUTION INTRAMUSCULAR; INTRAVENOUS at 14:06

## 2017-08-28 RX ADMIN — SODIUM CHLORIDE 125 ML/HR: 900 INJECTION, SOLUTION INTRAVENOUS at 18:20

## 2017-08-28 RX ADMIN — HYDROMORPHONE HYDROCHLORIDE 0.2 MG: 1 INJECTION, SOLUTION INTRAMUSCULAR; INTRAVENOUS; SUBCUTANEOUS at 16:40

## 2017-08-28 RX ADMIN — SODIUM CHLORIDE, SODIUM LACTATE, POTASSIUM CHLORIDE, AND CALCIUM CHLORIDE 125 ML/HR: 600; 310; 30; 20 INJECTION, SOLUTION INTRAVENOUS at 12:46

## 2017-08-28 NOTE — PROGRESS NOTES
TRANSFER - IN REPORT:    Verbal report received from RonyRN(name) on [address]  being received from [employer] for routine post - op      Report consisted of patients Situation, Background, Assessment and   Recommendations(SBAR). Information from the following report(s) SBAR, Kardex, OR Summary, Intake/Output, MAR and Recent Results was reviewed with the receiving nurse. Opportunity for questions and clarification was provided. Assessment completed upon patients arrival to unit and care assumed.

## 2017-08-28 NOTE — PERIOP NOTES
TRANSFER - OUT REPORT:    Verbal report given to [place] on [facility].  being transferred to [id number] for routine post - op       Report consisted of patients Situation, Background, Assessment and   Recommendations(SBAR). Time Pre op antibiotic given:1425  Anesthesia Stop time: 2066  Hurtado Present on Transfer to floor:no  Order for Hurtado on Chart:n/a    Information from the following report(s) SBAR, OR Summary, Intake/Output and MAR was reviewed with the receiving nurse. Opportunity for questions and clarification was provided. Is the patient on 02? YES       L/Min 2       Other via NC    Is the patient on a monitor? NO    Is the nurse transporting with the patient? NO    Surgical Waiting Area notified of patient's transfer from PACU? YES      The following personal items collected during your admission accompanied patient upon transfer:   Dental Appliance: Dental Appliances: None  Vision:    Hearing Aid:    Jewelry:    Clothing: Clothing:  At bedside, With patient (in PACU)  Other Valuables:    Valuables sent to safe:

## 2017-08-28 NOTE — ANESTHESIA POSTPROCEDURE EVALUATION
Post-Anesthesia Evaluation and Assessment    Patient: Claudia Boyce MRN: [id number]  SSN: [id number]    YOB: 1960  Age: 56 y.o. Sex: male       Cardiovascular Function/Vital Signs  Visit Vitals    /65    Pulse 79    Temp 36.7 °C (98.1 °F)    Resp 11    SpO2 94%       Patient is status post general anesthesia for Procedure(s):  C6-7 ANTERIOR CERVICAL DISCECTOMY WITH FUSION . Nausea/Vomiting: None    Postoperative hydration reviewed and adequate. Pain:  Pain Scale 1: Numeric (0 - 10) (08/28/17 1620)  Pain Intensity 1: 9 (08/28/17 1620)   Managed    Neurological Status:   Neuro (WDL): Exceptions to WDL (08/28/17 1620)  Neuro  Neurologic State: Drowsy (08/28/17 1620)  Orientation Level: Oriented X4 (08/28/17 1620)  Cognition: Appropriate decision making; Appropriate safety awareness; Follows commands (08/28/17 1620)  LUE Motor Response: Purposeful (08/28/17 1620)  LLE Motor Response: Purposeful (08/28/17 1620)  RUE Motor Response: Purposeful (08/28/17 1620)  RLE Motor Response: Purposeful (08/28/17 1620)   At baseline    Mental Status and Level of Consciousness: Arousable    Pulmonary Status:   O2 Device: CO2 nasal cannula (08/28/17 1620)   Adequate oxygenation and airway patent    Complications related to anesthesia: None    Post-anesthesia assessment completed.  No concerns    Signed By: Tai Gonzalez MD     August 28, 2017

## 2017-08-28 NOTE — OP NOTES
[address]   [address] [facility], [address] [place] [address]   OP NOTE       Name:  Merwin Landau   MR#:  [id number]   :  1960   Account #:  [de-identified]    Surgery Date:  2017   Date of Adm:  2017       PREOPERATIVE DIAGNOSES:   1. Cervical herniated nucleus pulposus, C6-C7. 2. Cervical radiculopathy. POSTOPERATIVE DIAGNOSES:   1. Cervical herniated nucleus pulposus, C6-C7. 2. Cervical radiculopathy. PROCEDURES PERFORMED:   1. Anterior cervical diskectomy, C6-C7.   2. Anterior interbody fusion, C6-C7. 3. Anterior plate and screw fixation, C6-C7. SURGEON: Girish Dacosta. MD Farida    ASSISTANT: Gavi Moreira PA-C    ESTIMATED BLOOD LOSS: Minimal.    SPECIMENS REMOVED: Stable    ANESTHESIA:  General    COMPLICATIONS: None. INDICATIONS: This is a pleasant gentleman with a history of neck   pain and left arm pain, 56years old, pain radiating to the roughly C6-  C7 distribution. Severe foraminal stenosis on the left-hand side due to   broad-based disk bulge, as well as disk space collapse. The patient for   a cervical decompression. DESCRIPTION OF PROCEDURE: The patient was identified, brought   to the operative suite and underwent general anesthesia without   difficulty. Preop neuromonitoring was placed, baselines obtained. These remained stable throughout the surgical procedure. After neck   was prepped and draped sterilely, time-out was performed. We then   did a transverse incision on the left-hand side. Dissection was carried   down with exposure of the C6 and C7 disk space. We   longitudinally just went to the platysma, blunt dissection to medial   sternocleidomastoid down to deep cervical fascia. X-ray was used to   localize the C6-C7 level. Longus colli were elevated. Radiolucent   retractors were placed. Annulotomy was performed and we provided a   complete discectomy at C6-C7 with removal of disk material as well as   cartilaginous endplates.  We removed the posterior vertebral   osteophytes. Resected down without difficulty. The posterior   longitudinal ligament was elevated without difficulty. Resection was   performed of the posterior longitudinal ligament. Bilateral   foraminotomies were performed. Severe spondylosis and stenosis   noted on the left foraminal region. Undercut the uncinate process with   #1 and #2 Kerrison. Disk material as well as bone spurs were removed   out of the foraminal region lateral recess without difficulty and   afterwards, a blunt nerve hook was passed without difficulty. After   adequate decompression, we then did a trial spaces with a nonlordotic   graft providing good anterior column reconstruction. We then packed a   K2 titanium  cage with Rebecca allograft. Endplates were lightly rasped. We then impacted the graft in place with 2 mm of countersinking. Neuromonitoring was stable. Weight was removed off the head. We   then contoured a K2M [place] plate, 20 mm in length. Temporary   fixation with a threaded pin, followed by 16 mm screws into C6-C7 with   good fixation obtained. Neuromonitoring was stable. Deep retractors   were removed. Soft tissue examined. No overt signs of injury. A #7 flat   SHANNON drain placed, 2-0 Vicryl subcutaneous layer and 3-0 Vicryl in the   skin. The patient returned to the PACU in stable condition.         MD Rivka Hobbs   D:  08/28/2017   15:48   T:  08/28/2017   16:40   Job #:  [id number]

## 2017-08-28 NOTE — BRIEF OP NOTE
BRIEF OPERATIVE NOTE    Date of Procedure: 8/28/2017   Preoperative Diagnosis: RADICULITIS  Postoperative Diagnosis: RADICULITIS    Procedure(s):  C6-7 ANTERIOR CERVICAL DISCECTOMY WITH FUSION   Surgeon(s) and Role:     * Kamlesh Gutierrez MD - Primary         Assistant Staff:  Physician Assistant: Servando Cool PA-C    Surgical Staff:  Circ-1: Shahana Angel RN  Circ-Relief: Can Arevalo  Physician Assistant: Servando Cool PA-C  Scrub Tech-1: Rekha Ray  Scrub RN-1: Remy Mcclain RN  Event Time In   Incision Start 1452   Incision Close      Anesthesia: General   Estimated Blood Loss: minimal  Specimens: * No specimens in log *   Findings: stenosis   Complications: none  Implants:   Implant Name Type Inv.  Item Serial No.  Lot No. LRB No. Used Action   GRAFT BNE ELITE [employer] SM --  - [id number]  GRAFT BNE [employer] SM --  [id number] MUSCULOSKELETAL TRANS N/A N/A 1 Implanted   cervical [id number]; 7 degree   N /A [employer] [id number] N/A 1 Implanted   PLATE BNE CERV 1-LVL 22MM -- PYRENEES - SN/A  PLATE BNE CERV 1-LVL 22MM -- PYRENEES N/A [employer] N/A N/A 1 Implanted   SCR SPNE CERV STATIC 4X16MM -- PYRENEES - SN/A   SCR SPNE CERV STATIC 4X16MM -- PYRENEES N/A [employer] N/A N/A 4 Implanted

## 2017-08-28 NOTE — ANESTHESIA PREPROCEDURE EVALUATION
Anesthetic History   No history of anesthetic complications            Review of Systems / Medical History  Patient summary reviewed, nursing notes reviewed and pertinent labs reviewed    Pulmonary  Within defined limits                 Neuro/Psych   Within defined limits           Cardiovascular    Hypertension: well controlled                   GI/Hepatic/Renal  Within defined limits              Endo/Other    Diabetes: well controlled, type 2         Other Findings              Physical Exam    Airway  Mallampati: II  TM Distance: > 6 cm  Neck ROM: normal range of motion   Mouth opening: Normal     Cardiovascular  Regular rate and rhythm,  S1 and S2 normal,  no murmur, click, rub, or gallop             Dental  No notable dental hx       Pulmonary  Breath sounds clear to auscultation               Abdominal  GI exam deferred       Other Findings            Anesthetic Plan    ASA: 2  Anesthesia type: general          Induction: Intravenous  Anesthetic plan and risks discussed with: Patient

## 2017-08-28 NOTE — PERIOP NOTES
Pt awake in PACU. During assessment c/o severe left arm pain from elbow down to hand. Cannot state quality just constant pain. Able to move and has full strength. No numbness noted. Notified Dr. Armando Horvath and received order for IV steroid. PERLA Last to bedside to assess patient. No further new orders. Will continue to treat pain and assess neuro status per protocol.

## 2017-08-28 NOTE — ROUTINE PROCESS
Patient: Kimberly Carpenter. MRN: [id number]  SSN: [id number]   YOB: 1960  Age: 56 y.o. Sex: male     Patient is status post Procedure(s):  C6-7 ANTERIOR CERVICAL DISCECTOMY WITH FUSION . Surgeon(s) and Role:     * Addie Vivas MD - Primary    Local/Dose/Irrigation:  See EMAR                  Peripheral IV 08/28/17 Left Forearm (Active)   Site Assessment Clean, dry, & intact 8/28/2017 12:45 PM   Phlebitis Assessment 0 8/28/2017 12:45 PM   Infiltration Assessment 0 8/28/2017 12:45 PM   Dressing Status Occlusive 8/28/2017 12:45 PM   Dressing Type Transparent 8/28/2017 12:45 PM   Hub Color/Line Status Green;Flushed 8/28/2017 12:45 PM          Rafita-Galeano Drain 08/28/17 Left Neck (Active)   Site Assessment Clean, dry, & intact 8/28/2017  3:49 PM   Dressing Status Clean, dry, & intact 8/28/2017  3:49 PM   Status Patent; Charged;Draining 8/28/2017  3:49 PM   Drainage Color Sanguinous 8/28/2017  3:49 PM      Airway - Endotracheal Tube 08/28/17 Oral (Active)                   Dressing/Packing:  Wound Neck Anterior-DRESSING TYPE: 4 x 4;Adhesive wound closure strips (Steri-Strips); Special tape (comment) (08/28/17 1550)  Wound Neck Left; Anterior-DRESSING TYPE: 4 x 4;Special tape (comment) (08/28/17 1550)  Splint/Cast: Wound Neck Anterior-SPLINT TYPE/MATERIAL: Cervical Collar  Wound Neck Left; Anterior-SPLINT TYPE/MATERIAL: Cervical Collar]    Other:  cerv collar; SCDs; TEDs; no shabazz for case

## 2017-08-28 NOTE — IP AVS SNAPSHOT
[address] [place] [address] 
[phone] Patient: Dotty Arellano. MRN: [id number] OCH:[id number] You are allergic to the following Allergen Reactions Vioxx (Rofecoxib) Swelling Other (comments) SWELLING OF UVULA, DIFFICULTY BREATHING Recent Documentation Smoking Status Current Every Day Smoker Emergency Contacts Name Discharge Info Relation Home Work Mobile Eleanor Conway DISCHARGE CAREGIVER [3] Daughter [21]   [phone] About your hospitalization You were admitted on:  August 28, 2017 You last received care in the:  [address] [place] [address] You were discharged on:  August 29, 2017 Unit phone number:  [phone] Why you were hospitalized Your primary diagnosis was:  Acute Dyspnea Your diagnoses also included:  Hnp (Herniated Nucleus Pulposus), Cervical  
  
  
 
  
  
Providers Seen During Your Hospitalizations Provider Role Specialty Primary office phone Gisele Borrego MD Attending Provider Orthopedic Surgery [phone] Your Primary Care Physician (PCP) Primary Care Physician Office Phone Office Fax Polly Jhaveri, [address] [facility] [phone] Follow-up Information Follow up With Details Comments Contact Info Latoya Arboleda NP On 9/5/2017 For discharge follow up at 8:30AM  [address] [place] [address] [address] [facility] 
[phone] Your Appointments Tuesday September 05, 2017  8:30 AM EDT TRANSITIONAL CARE MANAGEMENT with Latoya Arboleda  [facility] [address] [place] [address]  
[phone] Current Discharge Medication List  
  
START taking these medications Dose & Instructions Dispensing Information Comments Morning Noon Evening Bedtime  
 diazePAM 5 mg tablet Commonly known as:  VALIUM  
   
 Your last dose was: Your next dose is:    
   
   
 Dose:  5 mg Take 1 Tab by mouth every six (6) hours as needed for Anxiety. Max Daily Amount: 20 mg. Indications: MUSCLE SPASM Quantity:  30 Tab Refills:  0  
     
   
   
   
  
 oxyCODONE IR 5 mg immediate release tablet Commonly known as:  Major Herminio Your last dose was: Your next dose is:    
   
   
 Dose:  5-10 mg Take 1-2 Tabs by mouth every three (3) hours as needed. Max Daily Amount: 80 mg.  
 Quantity:  80 Tab Refills:  0 CONTINUE these medications which have NOT CHANGED Dose & Instructions Dispensing Information Comments Morning Noon Evening Bedtime CRESTOR 10 mg tablet Generic drug:  rosuvastatin Your last dose was: Your next dose is:    
   
   
 Dose:  10 mg Take 10 mg by mouth daily. Refills:  0  
     
   
   
   
  
 gabapentin 300 mg capsule Commonly known as:  NEURONTIN Your last dose was: Your next dose is:    
   
   
 Dose:  300 mg Take 300 mg by mouth nightly as needed. TAKE 1 TO 2 AS NEEDED AT BEDTIME Refills:  0  
     
   
   
   
  
 lansoprazole 30 mg capsule Commonly known as:  PREVACID Your last dose was: Your next dose is:    
   
   
 Dose:  30 mg Take 30 mg by mouth Daily (before breakfast). Refills:  0  
     
   
   
   
  
 metFORMIN 500 mg tablet Commonly known as:  GLUCOPHAGE Your last dose was: Your next dose is:    
   
   
 Dose:  500 mg Take 1 Tab by mouth two (2) times daily (with meals). Quantity:  180 Tab Refills:  0 STOP taking these medications ALEVE 220 mg Cap Generic drug:  naproxen sodium  
   
  
 meloxicam 15 mg tablet Commonly known as:  ARPAN [facility]  
   
  
  
ASK your doctor about these medications Dose & Instructions Dispensing Information Comments Morning Noon Evening Bedtime DIOVAN 160 mg tablet Generic drug:  valsartan Your last dose was: Your next dose is:    
   
   
 Dose:  160 mg Take 160 mg by mouth daily. Refills:  0 Where to Get Your Medications Information on where to get these meds will be given to you by the nurse or doctor. ! Ask your nurse or doctor about these medications  
  diazePAM 5 mg tablet  
 oxyCODONE IR 5 mg immediate release tablet Discharge Instructions After Hospital Care Plan:  Discharge Instructions Cervical (Neck) Spine Surgery Dr. Dominga Mckeon Patient Name: Jag Black. Date of procedure: 8/28/2017  Date of discharge: 8/29/2017 Procedure: Procedure(s): 
C6-7 ANTERIOR CERVICAL DISCECTOMY WITH FUSION   PCP: Randy Medina NP Follow up appointments  
-follow up with Dr. Dominga Mckeon in 2 weeks. Call [phone] to make an appointment as soon as you get home from the hospital. 
 
When to call your Orthopaedic Surgeon: 
-Difficulty swallowing that is worse than when you left the hospital. 
-Signs of infection-if your incision is red; continues to have drainage; drainage has a foul odor or if you have a persistent fever over 101 degrees for 24 hours 
-nausea or vomiting, severe headache 
-changes in sensation in your arms or legs (numbness, tingling, loss of color) 
-increased weakness-greater than before your surgery 
-severe pain or pain not relieved by medications 
-Signs of a blood clot in your leg-calf pain, tenderness, redness, swelling of lower leg When to call your Primary Care Physician: 
-Concerns about medical conditions such as diabetes, high blood pressure, asthma, congestive heart failure 
-Call if blood sugars are elevated, persistent headache or dizziness, coughing or congestion, constipation or diarrhea, burning with urination, abnormal heart rate When to call 911 and go to the nearest emergency room: -acute onset of chest pain, shortness of breath, difficulty breathing Activity - You are going home a well person, be as active as possible. Your only exercise should be walking. Start with short frequent walks and increase your walking distance each day. 
-Limit the amount of time you sit to 20-30 minute intervals. Sitting for prolonged periods of time will be uncomfortable for you following surgery. - Do NOT lift anything over 5 pounds 
-From now on, even when lifting light weight, bend with your knees and not your back. 
-Do NOT do any neck exercises until you have been instructed by your doctor 
-When you are in bed, you may lay on your back or on either side. Do NOT lie on your stomach Cervical Collar (Aspen Collar) -You are required to wear your cervical collar at all times; except when showering. You may remove the collar long enough to change the pads when needed and to change your dressing each day. -Do not bend or twist when your collar is off. It is best to have someone assist you when changing the pads and your dressing to prevent you from bending your neck. - Clean the pads on your neck collar every day by hand washing with a mild soap and water. Pat them dry with a towel and lay out to air dry. Do not use heat to dry the pads. Driving 
-You may not drive or return to work until instructed by your physician. However, you may ride in the car for short periods of time. Incision Care 
-You may take brief showers but do not run the water run directly onto the wound. After showering or bathing, remove the wet dressing and gently blot the wound dry with a soft towel. 
-Do not rub or apply any lotions or ointments to your incision site.  
-Do not soak or scrub your wound; do not swim until the adhesive film has fallen off naturally 
-Do not scratch, rub, or pick at the wound or skin glue, doing so may loosen the film before the wound is fully healed -Stay out of direct sunlight and do not use tanning lamps Showering 
-You may shower in approximately 2 days after your surgery.   
-Leave the dressing on during your shower. Do NOT allow the water to run directly onto your dressing. Once you get out of the shower, put on a dry dressing. 
-Reminder- Make sure you put clean pads on your collar after your shower.   
-Do not take a tub bath. Preventing blood clots 
-You have been given T.E.D. stockings to wear. Continue to wear these for 7 days after your discharge. Put them on in the morning and take them off at night.   
-They are used to increase your circulation and prevent blood clots from forming in your legs 
-T. E.D. stockings can be machine washed, temperature not to exceed 160° F (71°C) and machine dried for 15 to 20 minutes, temperature not to exceed 250° F (121°C). Pain management 
-Take pain medication as prescribed; decrease the amount you use as your pain lessens 
-Do not wait until you are in extreme pain to take your medication. 
-Avoid alcoholic beverages while taking pain medication Pain Medication Safety DO: 
-Read the Medication Guide  
-Take your medicine exactly as prescribed  
-Store your medicine away from children and in a safe place  
-Flush unused medicine down the toilet  
-Call your healthcare provider for medical advice about side effects. You may report side effects to FDA at 7-867-FDA-6617.  
-Please be aware that many medications contain Tylenol. We do not want you to over medicate so please read the information below as a guide. Do not take more than 4 Grams of Tylenol in a 24 hour period. (There are 1000 milligrams in one Gram) Percocet contains 325 mg of Tylenol per tablet (do not take more than 12 tablets in 24 hours) Lortab contains 500 mg of Tylenol per tablet (do not take more than 8 tablets in 24 hours) Norco contains 325 mg of Tylenol per tablet (do not take more than 12 tablets in 24 hours). DO NOT: 
-Do not give your medicine to others  
-Do not take medicine unless it was prescribed for you  
-Do not stop taking your medicine without talking to your healthcare provider  
-Do not break, chew, crush, dissolve, or inject your medicine. If you cannot  swallow your medicine whole, talk to your healthcare provider. 
-Do not drink alcohol while taking this medicine 
-Do not take anti-inflammatory medications or aspirin unless instructed by your     Physician. Diet 
-resume usual diet; drink plenty of fluids; eat foods high in fiber 
-It is important to have regular bowel movements. Pain medications may cause constipation. You may want to take a stool softener (such as Senokot-S or Colace) to prevent constipation. If constipation occurs, take a laxative (such as Dulcolax tablets, Milk of Magnesia, or a suppository). Laxatives should only be used if the above preventable measures have failed and you still have not had a bowel movement after three days. Discharge Instructions Attachments/References MEFS - OXYCODONE, RAPID RELEASE (ETH-OXYDOSE, OXY IR, ROXICODONE) - (BY MOUTH) (ENGLISH) MEFS - DIAZEPAM (DIAZEPAM INTENSOL, VALIUM, GABAVALE-5) - (BY MOUTH) (ENGLISH) Discharge Orders None Introducing [facility] [place] [facility]! Dear Abdulaziz Henriquez: 
Thank you for requesting a [employer] account. Our records indicate that you already have an active [employer] account. You can access your account anytime at [email or web address] Did you know that you can access your hospital and ER discharge instructions at any time in [employer]? You can also review all of your test results from your hospital stay or ER visit. Additional Information If you have questions, please visit the Frequently Asked Questions section of the [employer] website at [email or web address] Remember, MyChart is NOT to be used for urgent needs. For medical emergencies, dial 911. Now available from your iPhone and Android! General Information Please provide this summary of care documentation to your next provider. Patient Signature:  ____________________________________________________________ Date:  ____________________________________________________________  
  
Minda Nieves Provider Signature:  ____________________________________________________________ Date:  ____________________________________________________________ More Information Oxycodone, Rapid Release (ETH-Oxydose, Oxy IR, Roxicodone) - (By mouth) Why this medicine is used:  
Treats moderate to severe pain. This medicine is a narcotic pain reliever. Contact a nurse or doctor right away if you have: 
· Fast or slow heart beat, shallow breathing, blue lips, skin or fingernails · Anxiety, restlessness, fever, sweating, muscle spasms, twitching, seeing or hearing things that are not there · Extreme weakness, shallow breathing, slow heartbeat · Severe confusion, lightheadedness, dizziness, fainting · Sweating or cold, clammy skin, seizures · Severe constipation, stomach pain, nausea, vomiting Common side effects: · Mild constipation · Sleepiness, tiredness © 2017 [facility] Information is for End User's use only and may not be sold, redistributed or otherwise used for commercial purposes. Diazepam (Diazepam Intensol, Valium, Gabavale-5) - (By mouth) Why this medicine is used:  
Treats anxiety, alcohol withdrawl, muscle spasms, and seizures. Contact a nurse or doctor right away if you have: · Slow heartbeat, trouble breathing or speaking, blue lips, skin, or fingernails · Extreme drowsiness or weakness · Lightheadedness, dizziness, fainting · Confusion, problems with muscle control or coordination, seizures or tremors · Unusual mood or behavior, worsening depression, thoughts about hurting yourself, trouble sleeping Common side effects: · Blurred vision, changes in visions · Nausea, vomiting, constipation, diarrhea, stomach pain, headache, tiredness © 2017 [facility] Information is for End User's use only and may not be sold, redistributed or otherwise used for commercial purposes.

## 2017-08-29 VITALS
OXYGEN SATURATION: 95 % | BODY MASS INDEX: 36.62 KG/M2 | HEART RATE: 85 BPM | WEIGHT: 270 LBS | RESPIRATION RATE: 14 BRPM | DIASTOLIC BLOOD PRESSURE: 81 MMHG | TEMPERATURE: 98.3 F | SYSTOLIC BLOOD PRESSURE: 134 MMHG

## 2017-08-29 PROBLEM — R06.00 ACUTE DYSPNEA: Status: ACTIVE | Noted: 2017-08-29

## 2017-08-29 LAB
GLUCOSE BLD STRIP.AUTO-MCNC: 160 MG/DL (ref 65–100)
GLUCOSE BLD STRIP.AUTO-MCNC: 240 MG/DL (ref 65–100)
SERVICE CMNT-IMP: ABNORMAL
SERVICE CMNT-IMP: ABNORMAL

## 2017-08-29 PROCEDURE — 82962 GLUCOSE BLOOD TEST: CPT

## 2017-08-29 PROCEDURE — 74011636637 HC RX REV CODE- 636/637: Performed by: PHYSICIAN ASSISTANT

## 2017-08-29 PROCEDURE — 99218 HC RM OBSERVATION: CPT

## 2017-08-29 PROCEDURE — 74011250637 HC RX REV CODE- 250/637: Performed by: PHYSICIAN ASSISTANT

## 2017-08-29 PROCEDURE — 74011250636 HC RX REV CODE- 250/636: Performed by: PHYSICIAN ASSISTANT

## 2017-08-29 PROCEDURE — 74011250636 HC RX REV CODE- 250/636: Performed by: ORTHOPAEDIC SURGERY

## 2017-08-29 PROCEDURE — 74011250637 HC RX REV CODE- 250/637: Performed by: NURSE PRACTITIONER

## 2017-08-29 RX ORDER — OXYCODONE HYDROCHLORIDE 5 MG/1
5-10 TABLET ORAL
Qty: 80 TAB | Refills: 0 | Status: SHIPPED | OUTPATIENT
Start: 2017-08-29 | End: 2018-01-29

## 2017-08-29 RX ORDER — DIAZEPAM 5 MG/1
5 TABLET ORAL
Status: DISCONTINUED | OUTPATIENT
Start: 2017-08-29 | End: 2017-08-29 | Stop reason: HOSPADM

## 2017-08-29 RX ORDER — DIAZEPAM 5 MG/1
5 TABLET ORAL
Qty: 30 TAB | Refills: 0 | Status: SHIPPED | OUTPATIENT
Start: 2017-08-29 | End: 2017-10-13 | Stop reason: ALTCHOICE

## 2017-08-29 RX ORDER — CYCLOBENZAPRINE HCL 10 MG
10 TABLET ORAL
Qty: 40 TAB | Refills: 0 | Status: SHIPPED | OUTPATIENT
Start: 2017-08-29 | End: 2017-08-29

## 2017-08-29 RX ADMIN — OXYCODONE HYDROCHLORIDE 10 MG: 5 TABLET ORAL at 00:58

## 2017-08-29 RX ADMIN — OXYCODONE HYDROCHLORIDE 10 MG: 5 TABLET ORAL at 13:27

## 2017-08-29 RX ADMIN — OXYCODONE HYDROCHLORIDE 10 MG: 5 TABLET ORAL at 06:59

## 2017-08-29 RX ADMIN — ROSUVASTATIN CALCIUM 10 MG: 10 TABLET, FILM COATED ORAL at 08:09

## 2017-08-29 RX ADMIN — ACETAMINOPHEN 650 MG: 325 TABLET, FILM COATED ORAL at 00:59

## 2017-08-29 RX ADMIN — OXYCODONE HYDROCHLORIDE 10 MG: 5 TABLET ORAL at 10:32

## 2017-08-29 RX ADMIN — DEXAMETHASONE SODIUM PHOSPHATE 10 MG: 4 INJECTION INTRA-ARTICULAR; INTRALESIONAL; INTRAMUSCULAR; INTRAVENOUS; SOFT TISSUE at 11:59

## 2017-08-29 RX ADMIN — DIPHENHYDRAMINE HYDROCHLORIDE 12.5 MG: 50 INJECTION, SOLUTION INTRAMUSCULAR; INTRAVENOUS at 03:23

## 2017-08-29 RX ADMIN — INSULIN LISPRO 2 UNITS: 100 INJECTION, SOLUTION INTRAVENOUS; SUBCUTANEOUS at 07:30

## 2017-08-29 RX ADMIN — POLYETHYLENE GLYCOL 3350 17 G: 17 POWDER, FOR SOLUTION ORAL at 08:08

## 2017-08-29 RX ADMIN — ACETAMINOPHEN 650 MG: 325 TABLET, FILM COATED ORAL at 06:49

## 2017-08-29 RX ADMIN — DEXAMETHASONE SODIUM PHOSPHATE 10 MG: 4 INJECTION INTRA-ARTICULAR; INTRALESIONAL; INTRAMUSCULAR; INTRAVENOUS; SOFT TISSUE at 06:49

## 2017-08-29 RX ADMIN — Medication 10 ML: at 13:28

## 2017-08-29 RX ADMIN — Medication 10 ML: at 06:50

## 2017-08-29 RX ADMIN — ACETAMINOPHEN 650 MG: 325 TABLET, FILM COATED ORAL at 11:59

## 2017-08-29 RX ADMIN — PANTOPRAZOLE SODIUM 40 MG: 40 TABLET, DELAYED RELEASE ORAL at 06:49

## 2017-08-29 RX ADMIN — METFORMIN HYDROCHLORIDE 500 MG: 500 TABLET, FILM COATED ORAL at 09:39

## 2017-08-29 RX ADMIN — DOCUSATE SODIUM AND SENNOSIDES 1 TABLET: 8.6; 5 TABLET, FILM COATED ORAL at 08:09

## 2017-08-29 RX ADMIN — DIAZEPAM 5 MG: 5 TABLET ORAL at 11:59

## 2017-08-29 RX ADMIN — CEFAZOLIN 3 G: 1 INJECTION, POWDER, FOR SOLUTION INTRAMUSCULAR; INTRAVENOUS; PARENTERAL at 03:13

## 2017-08-29 RX ADMIN — INSULIN LISPRO 3 UNITS: 100 INJECTION, SOLUTION INTRAVENOUS; SUBCUTANEOUS at 11:57

## 2017-08-29 RX ADMIN — VALSARTAN 160 MG: 80 TABLET ORAL at 08:09

## 2017-08-29 NOTE — DISCHARGE INSTRUCTIONS
After Hospital Care Plan:  Discharge Instructions Cervical (Neck) Spine Surgery Dr. Dwight Pruett    Patient Name: Lexie Antonio. Date of procedure: 8/28/2017  Date of discharge: 8/29/2017    Procedure: Procedure(s):  C6-7 ANTERIOR CERVICAL DISCECTOMY WITH FUSION   PCP: Kavita Ambriz NP    Follow up appointments   -follow up with Dr. Dwight Pruett in 2 weeks. Call [phone] to make an appointment as soon as you get home from the hospital.    When to call your Orthopaedic Surgeon:  -Difficulty swallowing that is worse than when you left the hospital.  -Signs of infection-if your incision is red; continues to have drainage; drainage has a foul odor or if you have a persistent fever over 101 degrees for 24 hours  -nausea or vomiting, severe headache  -changes in sensation in your arms or legs (numbness, tingling, loss of color)  -increased weakness-greater than before your surgery  -severe pain or pain not relieved by medications  -Signs of a blood clot in your leg-calf pain, tenderness, redness, swelling of lower leg    When to call your Primary Care Physician:  -Concerns about medical conditions such as diabetes, high blood pressure, asthma, congestive heart failure  -Call if blood sugars are elevated, persistent headache or dizziness, coughing or congestion, constipation or diarrhea, burning with urination, abnormal heart rate    When to call 911 and go to the nearest emergency room:  -acute onset of chest pain, shortness of breath, difficulty breathing    Activity  - You are going home a well person, be as active as possible. Your only exercise should be walking. Start with short frequent walks and increase your walking distance each day.  -Limit the amount of time you sit to 20-30 minute intervals. Sitting for prolonged periods of time will be uncomfortable for you following surgery.   - Do NOT lift anything over 5 pounds  -From now on, even when lifting light weight, bend with your knees and not your back.  -Do NOT do any neck exercises until you have been instructed by your doctor  -When you are in bed, you may lay on your back or on either side. Do NOT lie on your stomach    Cervical Collar (Aspen Collar)  -You are required to wear your cervical collar at all times; except when showering. You may remove the collar long enough to change the pads when needed and to change your dressing each day. -Do not bend or twist when your collar is off. It is best to have someone assist you when changing the pads and your dressing to prevent you from bending your neck. - Clean the pads on your neck collar every day by hand washing with a mild soap and water. Pat them dry with a towel and lay out to air dry. Do not use heat to dry the pads. Driving  -You may not drive or return to work until instructed by your physician. However, you may ride in the car for short periods of time. Incision Care  -You may take brief showers but do not run the water run directly onto the wound. After showering or bathing, remove the wet dressing and gently blot the wound dry with a soft towel.  -Do not rub or apply any lotions or ointments to your incision site.   -Do not soak or scrub your wound; do not swim until the adhesive film has fallen off naturally  -Do not scratch, rub, or pick at the wound or skin glue, doing so may loosen the film before the wound is fully healed  -Stay out of direct sunlight and do not use tanning lamps     Showering  -You may shower in approximately 2 days after your surgery.    -Leave the dressing on during your shower. Do NOT allow the water to run directly onto your dressing. Once you get out of the shower, put on a dry dressing.  -Reminder- Make sure you put clean pads on your collar after your shower.    -Do not take a tub bath. Preventing blood clots  -You have been given T.E.D. stockings to wear. Continue to wear these for 7 days after your discharge.   Put them on in the morning and take them off at night.    -They are used to increase your circulation and prevent blood clots from forming in your legs  -T. E.D. stockings can be machine washed, temperature not to exceed 160° F (71°C) and machine dried for 15 to 20 minutes, temperature not to exceed 250° F (121°C). Pain management  -Take pain medication as prescribed; decrease the amount you use as your pain lessens  -Do not wait until you are in extreme pain to take your medication.  -Avoid alcoholic beverages while taking pain medication    Pain Medication Safety  DO:  -Read the Medication Guide   -Take your medicine exactly as prescribed   -Store your medicine away from children and in a safe place   -Flush unused medicine down the toilet   -Call your healthcare provider for medical advice about side effects. You may report side effects to FDA at 5-858-FDA-3474.   -Please be aware that many medications contain Tylenol. We do not want you to over medicate so please read the information below as a guide. Do not take more than 4 Grams of Tylenol in a 24 hour period. (There are 1000 milligrams in one Gram)                                                                                                                                                                                                    Percocet contains 325 mg of Tylenol per tablet (do not take more than 12 tablets in 24 hours)  Lortab contains 500 mg of Tylenol per tablet (do not take more than 8 tablets in 24 hours)  Norco contains 325 mg of Tylenol per tablet (do not take more than 12 tablets in 24 hours). DO NOT:  -Do not give your medicine to others   -Do not take medicine unless it was prescribed for you   -Do not stop taking your medicine without talking to your healthcare provider   -Do not break, chew, crush, dissolve, or inject your medicine.  If you cannot  swallow your medicine whole, talk to your healthcare provider.  -Do not drink alcohol while taking this medicine  -Do not take anti-inflammatory medications or aspirin unless instructed by your     Physician. Diet  -resume usual diet; drink plenty of fluids; eat foods high in fiber  -It is important to have regular bowel movements. Pain medications may cause constipation. You may want to take a stool softener (such as Senokot-S or Colace) to prevent constipation. If constipation occurs, take a laxative (such as Dulcolax tablets, Milk of Magnesia, or a suppository). Laxatives should only be used if the above preventable measures have failed and you still have not had a bowel movement after three days.

## 2017-08-29 NOTE — PROGRESS NOTES
Discharge instructions reviewed with patient. A time for questions allowed. Scripts for oxy and valium filled with bedside rx daughter to . Dressing supplies sent with patient.

## 2017-08-29 NOTE — PROGRESS NOTES
Consult received for home health, however there are no PT/OT consults. Cm completed consult, patient to be discharged home today.   [employer] , [place]

## 2017-08-29 NOTE — PROGRESS NOTES
Primary Nurse Mary Mills RN and Osman Mohr RN performed a dual skin assessment on this patient No impairment noted  Gabriel score is 23

## 2017-08-29 NOTE — PROGRESS NOTES
2158:  Patient complaining of SOB and feeling \"panicky\". Patient VS: Temp 98.1, HR 99, RR 16, O2 Sats 95%, /115. MD on call paged. Bedside shift change report given to Carey Centeno RN (oncoming nurse) by Sue Sharma RN (offgoing nurse). Report included the following information SBAR, Intake/Output and MAR.

## 2017-08-29 NOTE — DISCHARGE SUMMARY
[address] [place] [address]   [address]  [phone]     Discharge Summary       PATIENT ID: Patricia Morris MRN: [id number]   YOB: 1960    DATE OF ADMISSION: 8/28/2017 10:58 AM    DATE OF DISCHARGE: 8/29/2017   PRIMARY CARE PROVIDER: Jose Adair NP     CONSULTATIONS: IP CONSULT TO HOSPITALIST    PROCEDURES/SURGERIES: Procedure(s):  C6-7 ANTERIOR CERVICAL DISCECTOMY WITH FUSION     History of Present Illness:  Patricia Morris is a 56 y.o. male with a PMH significant for hyperlipidemia, Diabetes Mellitus type II, GERD, hypertension, GWENDOLYN, and cervical stenosis. He presented to Dr. Eunice Longo office with complaints of severe neck and left arm pain. The pain is stabbing, severe, and progressive in nature. His imaging revealed a broad-based disk bulge at C6-7. After failing conservative therapy and a discussion of the risks, benefits, alternatives, perioperative course, and potential complications of surgery, he consented to undergo a Procedure(s):  C6-7 ANTERIOR CERVICAL DISCECTOMY WITH FUSION . Hospital Course:  Patricia Morris tolerated the procedure well. He was transferred  to the recovery room in stable condition. After a brief stay the patient was then transferred to the Spinal Surgery Unit at [address] [place] [address].  On postoperative day #1, the dressing was clean and dry, he was neurovascularly intact. The patient was afebrile and vital signs were stable. Calves were soft and non-tender bilaterally. The patient's hospital course was complicated by acute dyspnea and the hospitalist service was consulted. His work-up did not reveal any cardiac or pulmonary source and he shortness of breath improved with anxiolytics and supplemental O2. His cervical hemovac drain was removed on postoperative day #1. He was tolerating a diet without complaints of dysphagia.      Hemoglobin prior to discharge were   Lab Results   Component Value Date/Time    HGB 15.7 08/14/2017 10:29 AM        Tyler Teryr.  was discharged to Home in stable condition on postoperative day 1. He was provided with routine postoperative instructions and advised to follow up with Louann Mcdonald MD in 2 weeks following discharge from the hospital.        FOLLOW UP APPOINTMENTS:    Follow-up Information     Follow up With Details Comments Contact Info    hCristian Hagan NP On 9/5/2017 For discharge follow up at 8:30AM  [address] [place] [address] [phone]             ADDITIONAL CARE RECOMMENDATIONS:     When to call your Orthopaedic Surgeon:  -Difficulty swallowing that is worse than when you left the hospital.  -Signs of infection-if your incision is red; continues to have drainage; drainage has a foul odor or if you have a persistent fever over 101 degrees for 24 hours  -nausea or vomiting, severe headache  -changes in sensation in your arms or legs (numbness, tingling, loss of color)  -increased weakness-greater than before your surgery  -severe pain or pain not relieved by medications  -Signs of a blood clot in your leg-calf pain, tenderness, redness, swelling of lower leg    When to call your Primary Care Physician:  -Concerns about medical conditions such as diabetes, high blood pressure, asthma, congestive heart failure  -Call if blood sugars are elevated, persistent headache or dizziness, coughing or congestion, constipation or diarrhea, burning with urination, abnormal heart rate    When to call 911 and go to the nearest emergency room:  -acute onset of chest pain, shortness of breath, difficulty breathing    Activity  - You are going home a well person, be as active as possible. Your only exercise should be walking. Start with short frequent walks and increase your walking distance each day.  -Limit the amount of time you sit to 20-30 minute intervals.   Sitting for prolonged periods of time will be uncomfortable for you following surgery. - Do NOT lift anything over 5 pounds  -From now on, even when lifting light weight, bend with your knees and not your back.  -Do NOT do any neck exercises until you have been instructed by your doctor  -When you are in bed, you may lay on your back or on either side. Do NOT lie on your stomach    Cervical Collar (Aspen Collar)  -You are required to wear your cervical collar at all times; except when showering. You may remove the collar long enough to change the pads when needed and to change your dressing each day. -Do not bend or twist when your collar is off. It is best to have someone assist you when changing the pads and your dressing to prevent you from bending your neck. - Clean the pads on your neck collar every day by hand washing with a mild soap and water. Pat them dry with a towel and lay out to air dry. Do not use heat to dry the pads. Driving  -You may not drive or return to work until instructed by your physician. However, you may ride in the car for short periods of time. Incision Care  -You may take brief showers but do not run the water run directly onto the wound. After showering or bathing, remove the wet dressing and gently blot the wound dry with a soft towel.  -Do not rub or apply any lotions or ointments to your incision site.   -Do not soak or scrub your wound; do not swim until the adhesive film has fallen off naturally  -Do not scratch, rub, or pick at the wound or skin glue, doing so may loosen the film before the wound is fully healed  -Stay out of direct sunlight and do not use tanning lamps     Showering  -You may shower in approximately 2 days after your surgery.    -Leave the dressing on during your shower. Do NOT allow the water to run directly onto your dressing.    Once you get out of the shower, put on a dry dressing.  -Reminder- Make sure you put clean pads on your collar after your shower.    -Do not take a tub bath.    Preventing blood clots  -You have been given T.E.D. stockings to wear. Continue to wear these for 7 days after your discharge. Put them on in the morning and take them off at night.    -They are used to increase your circulation and prevent blood clots from forming in your legs  -T. E.D. stockings can be machine washed, temperature not to exceed 160° F (71°C) and machine dried for 15 to 20 minutes, temperature not to exceed 250° F (121°C). Pain management  -Take pain medication as prescribed; decrease the amount you use as your pain lessens  -Do not wait until you are in extreme pain to take your medication.  -Avoid alcoholic beverages while taking pain medication    Pain Medication Safety  DO:  -Read the Medication Guide   -Take your medicine exactly as prescribed   -Store your medicine away from children and in a safe place   -Flush unused medicine down the toilet   -Call your healthcare provider for medical advice about side effects. You may report side effects to FDA at 7-323-FDA-7475.   -Please be aware that many medications contain Tylenol. We do not want you to over medicate so please read the information below as a guide. Do not take more than 4 Grams of Tylenol in a 24 hour period. (There are 1000 milligrams in one Gram)                                                                                                                                                                                                    Percocet contains 325 mg of Tylenol per tablet (do not take more than 12 tablets in 24 hours)  Lortab contains 500 mg of Tylenol per tablet (do not take more than 8 tablets in 24 hours)  Norco contains 325 mg of Tylenol per tablet (do not take more than 12 tablets in 24 hours).   DO NOT:  -Do not give your medicine to others   -Do not take medicine unless it was prescribed for you   -Do not stop taking your medicine without talking to your healthcare provider   -Do not break, chew, crush, dissolve, or inject your medicine. If you cannot  swallow your medicine whole, talk to your healthcare provider.  -Do not drink alcohol while taking this medicine  -Do not take anti-inflammatory medications or aspirin unless instructed by your     Physician. Diet  -resume usual diet; drink plenty of fluids; eat foods high in fiber  -It is important to have regular bowel movements. Pain medications may cause constipation. You may want to take a stool softener (such as Senokot-S or Colace) to prevent constipation. If constipation occurs, take a laxative (such as Dulcolax tablets, Milk of Magnesia, or a suppository). Laxatives should only be used if the above preventable measures have failed and you still have not had a bowel movement after three days. DISCHARGE MEDICATIONS:  Discharge Medication List as of 8/29/2017 12:06 PM      START taking these medications    Details   oxyCODONE IR (ROXICODONE) 5 mg immediate release tablet Take 1-2 Tabs by mouth every three (3) hours as needed. Max Daily Amount: 80 mg., Print, Disp-80 Tab, R-0      diazePAM (VALIUM) 5 mg tablet Take 1 Tab by mouth every six (6) hours as needed for Anxiety. Max Daily Amount: 20 mg. Indications: MUSCLE SPASM, Print, Disp-30 Tab, R-0         CONTINUE these medications which have NOT CHANGED    Details   gabapentin (NEURONTIN) 300 mg capsule Take 300 mg by mouth nightly as needed. TAKE 1 TO 2 AS NEEDED AT BEDTIME, Historical Med      metFORMIN (GLUCOPHAGE) 500 mg tablet Take 1 Tab by mouth two (2) times daily (with meals). , Normal, Disp-180 Tab, R-0      valsartan (DIOVAN) 160 mg tablet Take 160 mg by mouth daily. , Historical Med      lansoprazole (PREVACID) 30 mg capsule Take 30 mg by mouth Daily (before breakfast). , Historical Med      rosuvastatin (CRESTOR) 10 mg tablet Take 10 mg by mouth daily. , Historical Med         STOP taking these medications       meloxicam (MOBIC) 15 mg tablet Comments:   Reason for Stopping: naproxen sodium (ALEVE) 220 mg cap Comments:   Reason for Stopping:         cyclobenzaprine (FLEXERIL) 10 mg tablet Comments:   Reason for Stopping:               PHYSICAL EXAMINATION AT DISCHARGE:  General: Pleasant, alert, cooperative, no distress. EENT: EOMI. Anicteric sclerae. Oral mucous moist, oropharynx benign. Resp: CTA bilaterally. No wheezing/rhonchi/rales. No accessory muscle use. CV: Regular rhythm, normal rate, no murmurs, gallops, rubs. No cyanosis or clubbing. No edema appreciated in the extremities. Gastrointestinal:  Soft, non-tender, non-distended. normoactive bowel sounds, no hepatosplenomegaly  Neurological: Follows commands. ARCINIEGA. Speech clear. Sensation intact to light touch. Motor: unchanged C5-T1. Musculoskeletal:  Calves soft, supple, non-tender upon palpation or with passive stretch. Psych: Good insight. Not anxious nor agitated. Skin: Good turgor. No rashes or lesions. Incision - clean, dry and intact. No significant erythema or swelling.       CHRONIC MEDICAL DIAGNOSES:  Problem List as of 8/29/2017  Date Reviewed: 8/29/2017          Codes Class Noted - Resolved    * (Principal)Acute dyspnea ICD-10-CM: R06.00  ICD-9-CM: 786.09  8/29/2017 - Present        HNP (herniated nucleus pulposus), cervical ICD-10-CM: M50.20  ICD-9-CM: 722.0  8/28/2017 - Present        Pre-op evaluation ICD-10-CM: S44.098  ICD-9-CM: V72.84  8/21/2017 - Present        Neck pain ICD-10-CM: M54.2  ICD-9-CM: 723.1  8/21/2017 - Present        Hepatic steatosis ICD-10-CM: K76.0  ICD-9-CM: 571.8  3/2/2017 - Present        Hyperlipidemia ICD-10-CM: E78.5  ICD-9-CM: 272.4  3/2/2017 - Present        Fatigue ICD-10-CM: R53.83  ICD-9-CM: 780.79  3/2/2017 - Present        Diabetes mellitus type 2, controlled ([facility] ICD-10-CM: E11.9  ICD-9-CM: 250.00  3/2/2017 - Present        Insomnia ICD-10-CM: G47.00  ICD-9-CM: 780.52  1/18/2017 - Present        Abnormal chest x-ray ICD-10-CM: R93.8  ICD-9-CM: 793.2  1/18/2017 - Present        Encounter to establish care with new doctor ICD-10-CM: Z71.89  ICD-9-CM: V65.8  1/18/2017 - Present        Obstructive sleep apnea ICD-10-CM: G47.33  ICD-9-CM: 327.23  5/9/2013 - Present              Signed:   Luz Elena Mason NP  8/29/2017  1:52 PM

## 2017-08-29 NOTE — CONSULTS
Medical Consultation Report    Patient:  [facility]. MRN:  [id number]  YOB: 1960  Age:  56 y.o. Primary care provider:  Wero Odom NP    Date of admission:  8/28/2017    Date of consultation:  8/29/2017    Requesting physician:   Dr. Coby Styles    Reason for consultation:  Shortness of breath/ hypertension                               History of present illness  Rima Orr is a 56 y.o. male with past medical history of arthritis, chronic lower back and left arm pain, deviated nasal septum, type 2 diabetes mellitus, GERD, hypertension, hyperlipidemia, sleep apnea presented for admission on 8/28/2017 with diagnosis of C6-C7 cervical herniated nucleus pulposus and radiculopathy. Patient underwent anterior cervical diskectomy, anterior interbody fusion, and anterior plate and screw fixation of C6-C7 on 8/28/2017. Tonight, patient is seen per request of the attending service for reported shortness of breath and hypertension. Patient reportedly was dyspneic, onset tonight, constant, moderate severity, without specific known aggravating or alleviating factors. Per my chart review, patient's oxygen saturation over the course of yesterday ranged from 90% - 96%. Patient was on 2 liters O2 via nasal cannula earlier tonight. Per report, patient was thought to be anxious, was given a dose of Valium. Subsequently, per nurse report, patient has since reported resolution of shortness of breath.   patient admits that he had a \"panic attack\". During this episode, patient's BP was as high as 183/115. In the interim, BP has decreased but it remains elevated.   There were no reports of new onset syncope, loss of consciousness, headache, neck pain, visual disturbance, numbness, paresthesias, focal weakness, chest pain, palpitations, abdominal pain, nausea, vomiting, diarrhea, calf pain, increased leg swelling/ edema, fever, chills, rash. Impression/Recomendations  1. Acute dyspnea. Likely related to anxiety given improvement after Valium. Would recommend continue pulse oximetry monitoring. Will however order chest xray to rule out occult acute cardiopulmonary process. Continue to provide supplemental oxygen support. 2.  Hypertensive urgency. Resume home BP medications. Provide additional anti-hypertensive medication as needed. 3.  Type 2 diabetes mellitus. Order Humalog insulin correctional coverage, scheduled blood glucose checks and check hemoglobin A1c level. 4.  Hyperlipidemia. Continue Rosuvastatin at home dose. 5.  Constipation. Continue stool softeners. 6.  Tobacco abuse. Encourage smoking cessation. Nicotine patch already ordered. 7.  Sleep apnea. Would encourage use of CPAP machine. 8.  Panic attack. Supportive cares. 9. VTE prophylaxis. SCDs to BLEs. Thank you very much for allowing us to participate in the care of this pleasant patient. The hospitalist service will continue to follow the patient's medical progress along with you. Kathryn Wilhelm MD   Hospitalist    Past Medical History:   Diagnosis Date    Arthritis     Chronic pain     LOWER BACK , LEFT ARM    Deviated septum     Diabetes ([address]     BORDERLINE PER PATIENT    GERD (gastroesophageal reflux disease)     High cholesterol     Hypertension     Unspecified sleep apnea 2014    NO C-PAP      Past Surgical History:   Procedure Laterality Date    HX APPENDECTOMY      HX GI      COLONOSCOPY    HX HEENT  2014    Tonsils and uvula removed.  HX OTHER SURGICAL      WISDOM TEETH EXTRACTION    HX OTHER SURGICAL      EXPLORATORY LAP    HX SEPTOPLASTY          Prior to Admission medications    Medication Sig Start Date End Date Taking? Authorizing Provider   gabapentin (NEURONTIN) 300 mg capsule Take 300 mg by mouth nightly as needed.  TAKE 1 TO 2 AS NEEDED AT BEDTIME   Yes Historical Provider   meloxicam (MOBIC) 15 mg tablet Take 15 mg by mouth daily. Yes Historical Provider   metFORMIN (GLUCOPHAGE) 500 mg tablet Take 1 Tab by mouth two (2) times daily (with meals). 7/11/17  Yes Светлана Patel NP   naproxen sodium (ALEVE) 220 mg cap Take 220 mg by mouth as needed. Yes Historical Provider   valsartan (DIOVAN) 160 mg tablet Take 160 mg by mouth daily. Yes Historical Provider   lansoprazole (PREVACID) 30 mg capsule Take 30 mg by mouth Daily (before breakfast). Yes Historical Provider   rosuvastatin (CRESTOR) 10 mg tablet Take 10 mg by mouth daily.    Yes Historical Provider     Current Facility-Administered Medications   Medication Dose Route Frequency    gabapentin (NEURONTIN) capsule 300 mg  300 mg Oral QHS PRN    metFORMIN (GLUCOPHAGE) tablet 500 mg  500 mg Oral BID WITH MEALS    rosuvastatin (CRESTOR) tablet 10 mg  10 mg Oral DAILY    valsartan (DIOVAN) tablet 160 mg  160 mg Oral DAILY    insulin lispro (HUMALOG) injection   SubCUTAneous AC&HS    glucose chewable tablet 16 g  4 Tab Oral PRN    dextrose (D50W) injection syrg 12.5-25 g  12.5-25 g IntraVENous PRN    glucagon (GLUCAGEN) injection 1 mg  1 mg IntraMUSCular PRN    0.9% sodium chloride infusion  125 mL/hr IntraVENous CONTINUOUS    sodium chloride (NS) flush 5-10 mL  5-10 mL IntraVENous Q8H    sodium chloride (NS) flush 5-10 mL  5-10 mL IntraVENous PRN    naloxone (NARCAN) injection 0.4 mg  0.4 mg IntraVENous PRN    senna-docusate (PERICOLACE) 8.6-50 mg per tablet 1 Tab  1 Tab Oral BID    polyethylene glycol (MIRALAX) packet 17 g  17 g Oral DAILY    [START ON 8/30/2017] bisacodyl (DULCOLAX) suppository 10 mg  10 mg Rectal DAILY PRN    acetaminophen (TYLENOL) tablet 650 mg  650 mg Oral Q6H    oxyCODONE IR (ROXICODONE) tablet 5 mg  5 mg Oral Q3H PRN    oxyCODONE IR (ROXICODONE) tablet 10 mg  10 mg Oral Q3H PRN    morphine injection 2 mg  2 mg IntraVENous Q3H PRN    ceFAZolin (ANCEF) 3 g in 0.9%  ml IVPB 3 g IntraVENous Q8H    ondansetron (ZOFRAN) injection 4 mg  4 mg IntraVENous Q4H PRN    diphenhydrAMINE (BENADRYL) injection 12.5 mg  12.5 mg IntraVENous Q6H PRN    phenol throat spray (CHLORASEPTIC) 1 Spray  1 Spray Oral PRN    benzocaine-menthol (CEPACOL) lozenge 1 Lozenge  1 Lozenge Oral PRN    cyclobenzaprine (FLEXERIL) tablet 10 mg  10 mg Oral BID PRN    nicotine (NICODERM CQ) 21 mg/24 hr patch 1 Patch  1 Patch TransDERmal DAILY    thrombin (bovine) (THROMBIN-I) 5,000 unit topical solution   Topical ONCE    dexamethasone (DECADRON) 4 mg/mL injection 10 mg  10 mg IntraVENous Q6H    pantoprazole (PROTONIX) tablet 40 mg  40 mg Oral ACB     Allergies   Allergen Reactions    Vioxx [Rofecoxib] Swelling and Other (comments)     SWELLING OF UVULA, DIFFICULTY BREATHING      Family History   Problem Relation Age of Onset    Heart Disease Mother     Dementia Mother      Mandie Lie Anesth Problems Maternal Aunt      TROUBLE BREATHING AFTER HIP SURGERY        Social history    Ambulates      x  With cane             Alcohol history      x  occasional         History   Smoking Status    Current Every Day Smoker    Packs/day: 1.50    Years: 21.00   Smokeless Tobacco    Never Used       Review of systems  I performed a ten systems review; pertinent positives were as noted in HPI, otherwise negative. Physical Examination   Visit Vitals    BP (!) 152/93 (BP 1 Location: Left arm, BP Patient Position: At rest)    Pulse 91    Temp 98.7 °F (37.1 °C)    Resp 18    SpO2 93%       O2 Flow Rate (L/min): 1.5 l/min   O2 Device: Nasal cannula    General:  Obese male in no acute respiratory distress   Head:  Atraumatic   Eyes:  Conjunctivae/corneas clear.    E/N/M/T: Clear oropharynx   Neck: Dressings and SHANNON drain in place (with serosanguinous fluid)   Lungs:   Symmetrical chest expansion and respiratory effort  Clear to auscultation bilaterally   Chest wall:  No tenderness or deformity   Heart:  Regular rate and rhythm   Sounds normal; no murmur, click, rub or gallop   Abdomen:   Soft, no tenderness  No rebound, guarding, or rigidity  Non-distended  Bowel sounds normal  No masses or hepatosplenomegaly  No hernias present   Back: No CVA tenderness   Extremities: Extremities normal, atraumatic  No edema  No DVT signs   Pulses 2+ radial/ 1+ DP bilateral pulses   Skin: No rashes or ulcers  Warm / dry   Musculo-      skeletal: Gait not tested  No calf tenderness   Neuro: GCS 15. Moves all extremities x 4. No slurred speech. No facial droop. Sensation grossly intact. Psych: Alert, oriented x 3           Data Review    Point of care glucose:  Results for Paco Cunningham (MRN [id number]) as of 8/29/2017 06:49   Ref.  Range 8/28/2017 12:45 8/28/2017 18:55 8/28/2017 21:57   GLUCOSE,FAST - POC Latest Ref Range: 65 - 100 mg/dL 120 (H) 155 (H) 175 (H)

## 2017-08-29 NOTE — PROGRESS NOTES
Orthopedic Spine Progress Note  Post Op day: 1 Day Post-Op    2017 8:37 AM   Admit Date: 2017  Procedure: Procedure(s):  C6-7 ANTERIOR CERVICAL DISCECTOMY WITH FUSION     Subjective:     Yoon Navarro. has no complaints. Some arm discomfort persists, but stable overall. Some chest discomfort overnight, but improved. Tolerating diet. No N/V. Pain Control:   Pain Assessment  Pain Scale 1: Numeric (0 - 10)  Pain Intensity 1: 5  Pain Onset 1: postop  Pain Location 1: Arm  Pain Orientation 1: Left  Pain Description 1: Aching  Pain Intervention(s) 1: Rest    Objective:          Physical Exam:  General:  Alert and oriented. No acute distress. Heart:  Respirations unlabored. Abdomen:   Extremities: Soft, non-tender. No evidence of cyanosis. Pulses palpable in both upper and lower extremities. Neurologic:  Musculoskeletal:  No new motor deficits. Neurovascular exam within normal limits. Sensation stable. Motor: unchanged C5-T1 and L2-S1. Meme's sign negative in bilateral lower extremities. Calves soft, nontender upon palpation and with passive twitch. Moves both upper and lower extremities. Incision: clean, dry, and intact. No significant erythema or swelling. No active drainage noted. Vital Signs:    Blood pressure 134/81, pulse 85, temperature 98.3 °F (36.8 °C), resp. rate 14, SpO2 95 %. Temp (24hrs), Av.2 °F (36.8 °C), Min:98 °F (36.7 °C), Max:98.7 °F (37.1 °C)      LAB:    No results for input(s): HCT, HGB, PLT, HCTEXT, HGBEXT, PLTEXT in the last 72 hours.   Lab Results   Component Value Date/Time    Sodium 137 2017 10:29 AM    Potassium 4.3 2017 10:29 AM    Chloride 103 2017 10:29 AM    CO2 24 2017 10:29 AM    Glucose 179 2017 10:29 AM    BUN 16 2017 10:29 AM    Creatinine 0.92 2017 10:29 AM    Calcium 8.6 2017 10:29 AM       Intake/Output:701 -  1900  In: -   Out: 700 [Urine:700]  1901 -  0700  In: 1320 [P.O.:120; I.V.:1200]  Out: 1970 [LDDEN:7212; Drains:15]    PT/OT:   Gait:                    Assessment:   Patient is 1 Day Post-Op s/p Procedure(s):  C6-7 ANTERIOR CERVICAL DISCECTOMY WITH FUSION     Plan:     1. Continue PT/OT  2. Continue established methods of pain control  3. VTE Prophylaxes - TEDS &/or SCDs   4. Chest pain appears non cardiac in origin-appreciate hospitalist evaluation  5. Discharge to home today  6.        Signed By: Juany Sanchez PA-C

## 2017-08-29 NOTE — PROGRESS NOTES
Hospitalist Progress Note  Ifeoma Ogden MD  Office: [phone]        Date of Service:  2017  NAME:  Meg Parson. :  1960  MRN:  [id number]      Admission Summary:   56 y.o. male with past medical history of arthritis, chronic lower back and left arm pain, deviated nasal septum, type 2 diabetes mellitus, GERD, hypertension, hyperlipidemia, sleep apnea presented for admission on 2017 with diagnosis of C6-C7 cervical herniated nucleus pulposus and radiculopathy. Patient underwent anterior cervical diskectomy, anterior interbody fusion, and anterior plate and screw fixation of C6-C7 on 2017. Postop,shortness of breath and elev BP, thought to be related to anxiety. Hospitalist c/s for above complaint      Interval history / Subjective:   Doing ok. Reports yest and again this am he got anxious due to the neck padding in place and had sensation of claustrophobia and suffocation as he states feeling as though he was having a panic attack. Now doing better after recv med for anxiety     Assessment & Plan:     Dyspnea, resolved  -Likely related to anxiety given improvement after Valium. -cxr reviewed and ok oximetry monitoring.    -continue to provide supplemental oxygen support.     Hypertensive urgency. -Resume home BP medications. improved     Type 2 diabetes mellitus. -ssi     Hyperlipidemia. Continue Rosuvastatin at home dose.     Constipation. Continue stool softeners.     Tobacco abuse. Encourage smoking cessation. Nicotine patch already ordered.     Sleep apnea. Would encourage use of CPAP machine.     Panic attack. Supportive cares.     Code status:full  DVT prophylaxis: as per prim    Care Plan discussed with: Patient/Family  Disposition: TBD     Hospital Problems  Date Reviewed: 2017          Codes Class Noted POA    * (Principal)Acute dyspnea ICD-10-CM: R06.00  ICD-9-CM: 786.09  2017 Unknown        HNP (herniated nucleus pulposus), cervical ICD-10-CM: M50.20  ICD-9-CM: 722.0  8/28/2017 Unknown                Review of Systems:   A comprehensive review of systems was negative except for that written in the HPI. Vital Signs:    Last 24hrs VS reviewed since prior progress note. Most recent are:  Visit Vitals    /81 (BP 1 Location: Left arm, BP Patient Position: At rest)    Pulse 85    Temp 98.3 °F (36.8 °C)    Resp 14    Wt 122.5 kg (270 lb)    SpO2 95%    BMI 36.62 kg/m2         Intake/Output Summary (Last 24 hours) at 08/29/17 1802  Last data filed at 08/29/17 1337   Gross per 24 hour   Intake              320 ml   Output             3175 ml   Net            -2855 ml        Physical Examination:             Constitutional:  No acute distress, cooperative, pleasant    ENT:  Oral mucous moist, oropharynx benign. Neck supple,    Resp:  CTA bilaterally. No wheezing/rhonchi/rales. No accessory muscle use   CV:  Regular rhythm, normal rate, no murmurs, gallops, rubs    GI:  Soft, non distended, non tender. normoactive bowel sounds, no hepatosplenomegaly     Musculoskeletal:  No edema, warm, 2+ pulses throughout    Neurologic:  Moves all extremities. AAOx3, CN II-XII reviewed           Data Review:    Review and/or order of clinical lab test      Labs:   No results for input(s): WBC, HGB, HCT, PLT, HGBEXT, HCTEXT, PLTEXT in the last 72 hours. No results for input(s): NA, K, CL, CO2, BUN, CREA, GLU, CA, MG, PHOS, URICA in the last 72 hours. No results for input(s): SGOT, GPT, ALT, AP, TBIL, TBILI, TP, ALB, GLOB, GGT, AML, LPSE in the last 72 hours. No lab exists for component: AMYP, HLPSE  No results for input(s): INR, PTP, APTT in the last 72 hours. No lab exists for component: INREXT   No results for input(s): FE, TIBC, PSAT, FERR in the last 72 hours. No results found for: FOL, RBCF   No results for input(s): PH, PCO2, PO2 in the last 72 hours.   No results for input(s): CPK, CKNDX, TROIQ in the last 72 hours. No lab exists for component: CPKMB  Lab Results   Component Value Date/Time    Cholesterol, total 157 03/03/2017 08:26 AM    HDL Cholesterol 33 03/03/2017 08:26 AM    LDL, calculated 85 03/03/2017 08:26 AM    Triglyceride 195 03/03/2017 08:26 AM     Lab Results   Component Value Date/Time    Glucose (POC) 240 08/29/2017 11:49 AM    Glucose (POC) 160 08/29/2017 07:01 AM    Glucose (POC) 175 08/28/2017 09:57 PM    Glucose (POC) 155 08/28/2017 06:55 PM    Glucose (POC) 120 08/28/2017 12:45 PM     Lab Results   Component Value Date/Time    Color YELLOW/STRAW 08/14/2017 10:29 AM    Appearance CLEAR 08/14/2017 10:29 AM    Specific gravity 1.025 08/14/2017 10:29 AM    pH (UA) 5.0 08/14/2017 10:29 AM    Protein NEGATIVE  08/14/2017 10:29 AM    Glucose NEGATIVE  08/14/2017 10:29 AM    Ketone NEGATIVE  08/14/2017 10:29 AM    Bilirubin NEGATIVE  08/14/2017 10:29 AM    Urobilinogen 0.2 08/14/2017 10:29 AM    Nitrites NEGATIVE  08/14/2017 10:29 AM    Leukocyte Esterase NEGATIVE  08/14/2017 10:29 AM    Epithelial cells FEW 08/14/2017 10:29 AM    Bacteria NEGATIVE  08/14/2017 10:29 AM    WBC 0-4 08/14/2017 10:29 AM    RBC 0-5 08/14/2017 10:29 AM         Medications Reviewed:     No current facility-administered medications for this encounter. Current Outpatient Prescriptions   Medication Sig    oxyCODONE IR (ROXICODONE) 5 mg immediate release tablet Take 1-2 Tabs by mouth every three (3) hours as needed. Max Daily Amount: 80 mg.    diazePAM (VALIUM) 5 mg tablet Take 1 Tab by mouth every six (6) hours as needed for Anxiety. Max Daily Amount: 20 mg. Indications: MUSCLE SPASM    gabapentin (NEURONTIN) 300 mg capsule Take 300 mg by mouth nightly as needed. TAKE 1 TO 2 AS NEEDED AT BEDTIME    metFORMIN (GLUCOPHAGE) 500 mg tablet Take 1 Tab by mouth two (2) times daily (with meals).  valsartan (DIOVAN) 160 mg tablet Take 160 mg by mouth daily.     lansoprazole (PREVACID) 30 mg capsule Take 30 mg by mouth Daily (before breakfast).  rosuvastatin (CRESTOR) 10 mg tablet Take 10 mg by mouth daily.      ______________________________________________________________________  Fiorella Durham LENGTH OF STAY: - - -                 Abel Fishman MD

## 2017-10-09 DIAGNOSIS — E11.9 CONTROLLED TYPE 2 DIABETES MELLITUS WITHOUT COMPLICATION, WITHOUT LONG-TERM CURRENT USE OF INSULIN (HCC): ICD-10-CM

## 2017-10-09 RX ORDER — METFORMIN HYDROCHLORIDE 500 MG/1
TABLET ORAL
Qty: 180 TAB | Refills: 0 | OUTPATIENT
Start: 2017-10-09

## 2017-10-13 ENCOUNTER — OFFICE VISIT (OUTPATIENT)
Dept: FAMILY MEDICINE CLINIC | Age: 57
End: 2017-10-13

## 2017-10-13 VITALS
DIASTOLIC BLOOD PRESSURE: 82 MMHG | HEART RATE: 90 BPM | OXYGEN SATURATION: 97 % | WEIGHT: 274 LBS | SYSTOLIC BLOOD PRESSURE: 122 MMHG | BODY MASS INDEX: 37.11 KG/M2 | RESPIRATION RATE: 18 BRPM | TEMPERATURE: 98.3 F | HEIGHT: 72 IN

## 2017-10-13 DIAGNOSIS — Z11.59 NEED FOR HEPATITIS C SCREENING TEST: ICD-10-CM

## 2017-10-13 DIAGNOSIS — Z12.11 SCREENING FOR COLON CANCER: ICD-10-CM

## 2017-10-13 DIAGNOSIS — E78.00 PURE HYPERCHOLESTEROLEMIA: ICD-10-CM

## 2017-10-13 DIAGNOSIS — E11.9 CONTROLLED TYPE 2 DIABETES MELLITUS WITHOUT COMPLICATION, WITHOUT LONG-TERM CURRENT USE OF INSULIN (HCC): Primary | ICD-10-CM

## 2017-10-13 LAB
ALBUMIN UR QL STRIP: 10 MG/L
CREATININE, URINE POC: 50 MG/DL
MICROALBUMIN/CREAT RATIO POC: <30 MG/G

## 2017-10-13 RX ORDER — MELOXICAM 15 MG/1
TABLET ORAL
Refills: 2 | COMMUNITY
Start: 2017-07-19 | End: 2017-10-13 | Stop reason: ALTCHOICE

## 2017-10-13 RX ORDER — OXYCODONE AND ACETAMINOPHEN 5; 325 MG/1; MG/1
TABLET ORAL
Refills: 0 | COMMUNITY
Start: 2017-09-15 | End: 2017-10-13 | Stop reason: ALTCHOICE

## 2017-10-13 NOTE — PROGRESS NOTES
Subjective:     Antonio Negron is a 56 y.o. male seen for follow up of diabetes. He also has hyperlipidemia. Diabetic Review of Systems - medication compliance: compliant all of the time, diabetic diet compliance: noncompliant some of the time. Other symptoms and concerns: Follow up, for diabetes. Pt denies any concerns or complaints at this time. Pt has already received flu vaccine. Pt does not check blood sugar. Patient Active Problem List    Diagnosis Date Noted    Screening for colon cancer 10/13/2017    Need for hepatitis C screening test 10/13/2017    Acute dyspnea 08/29/2017    HNP (herniated nucleus pulposus), cervical 08/28/2017    Pre-op evaluation 08/21/2017    Neck pain 08/21/2017    Hepatic steatosis 03/02/2017    Hyperlipidemia 03/02/2017    Fatigue 03/02/2017    Diabetes mellitus type 2, controlled ([facility] [address] 03/02/2017    Insomnia 01/18/2017    Abnormal chest x-ray 01/18/2017    Encounter to establish care with new doctor 01/18/2017    Obstructive sleep apnea 05/09/2013     Current Outpatient Prescriptions   Medication Sig Dispense Refill    oxyCODONE IR (ROXICODONE) 5 mg immediate release tablet Take 1-2 Tabs by mouth every three (3) hours as needed. Max Daily Amount: 80 mg. 80 Tab 0    metFORMIN (GLUCOPHAGE) 500 mg tablet Take 1 Tab by mouth two (2) times daily (with meals). 180 Tab 0    valsartan (DIOVAN) 160 mg tablet Take 160 mg by mouth daily.  lansoprazole (PREVACID) 30 mg capsule Take 30 mg by mouth Daily (before breakfast).  rosuvastatin (CRESTOR) 10 mg tablet Take 10 mg by mouth daily.        Allergies   Allergen Reactions    Vioxx [Rofecoxib] Swelling and Other (comments)     SWELLING OF UVULA, DIFFICULTY BREATHING     Past Medical History:   Diagnosis Date    Arthritis     Chronic pain     LOWER BACK , LEFT ARM    Deviated septum     Diabetes (HCC)     BORDERLINE PER PATIENT    GERD (gastroesophageal reflux disease)     High cholesterol  Hypertension     Unspecified sleep apnea 2014    NO C-PAP     Past Surgical History:   Procedure Laterality Date    HX APPENDECTOMY      HX GI      COLONOSCOPY    HX HEENT  2014    Tonsils and uvula removed.  HX OTHER SURGICAL      WISDOM TEETH EXTRACTION    HX OTHER SURGICAL      EXPLORATORY LAP    HX SEPTOPLASTY       Family History   Problem Relation Age of Onset    Heart Disease Mother     Dementia Mother      Gabriela Lamas Anesth Problems Maternal Aunt      TROUBLE BREATHING AFTER HIP SURGERY     Social History   Substance Use Topics    Smoking status: Current Every Day Smoker     Packs/day: 1.50     Years: 21.00    Smokeless tobacco: Never Used    Alcohol use 1.5 oz/week     3 Cans of beer per week        Lab Results  Component Value Date/Time   WBC 9.9 08/14/2017 10:29 AM   HGB 15.7 08/14/2017 10:29 AM   HCT 46.7 08/14/2017 10:29 AM   PLATELET 509 08/46/6096 10:29 AM   MCV 92.7 08/14/2017 10:29 AM     Lab Results  Component Value Date/Time   Cholesterol, total 157 03/03/2017 08:26 AM   HDL Cholesterol 33 03/03/2017 08:26 AM   LDL, calculated 85 03/03/2017 08:26 AM   Triglyceride 195 03/03/2017 08:26 AM   Lab Results  Component Value Date/Time   GFR est non-AA >60 08/14/2017 10:29 AM   GFR est AA >60 08/14/2017 10:29 AM   Creatinine 0.92 08/14/2017 10:29 AM   BUN 16 08/14/2017 10:29 AM   Sodium 137 08/14/2017 10:29 AM   Potassium 4.3 08/14/2017 10:29 AM   Chloride 103 08/14/2017 10:29 AM   CO2 24 08/14/2017 10:29 AM                Review of Systems  A comprehensive review of systems was negative. Objective:   Visit Vitals    /90 (BP 1 Location: Left arm, BP Patient Position: Sitting)    Pulse 90    Temp 98.3 °F (36.8 °C) (Oral)    Resp 18    Ht 6' (1.829 m)    Wt 274 lb (124.3 kg)    SpO2 97%    BMI 37.16 kg/m2     Appearance: alert, well appearing, and in no distress.   Exam: heart sounds normal rate, regular rhythm, normal S1, S2, no murmurs, rubs, clicks or gallops, no carotid bruits  Lab review: orders written for new lab studies as appropriate; see orders. Assessment/Plan:     diabetes stable, hypertension stable, hyperlipidemia control uncertain. Diabetic issues reviewed with him: diabetic diet discussed in detail, written exchange diet given, low cholesterol diet, weight control and daily exercise discussed, foot care discussed and Podiatry visits discussed, annual eye examinations at Ophthalmology discussed and glycohemoglobin and other lab monitoring discussed. ICD-10-CM ICD-9-CM    1. Controlled type 2 diabetes mellitus without complication, without long-term current use of insulin (HCC) E11.9 250.00 AMB POC URINE, MICROALBUMIN, SEMIQUANT (3 RESULTS)      CBC W/O DIFF      METABOLIC PANEL, COMPREHENSIVE      HEMOGLOBIN A1C WITH EAG   2. Pure hypercholesterolemia E78.00 272.0 LIPID PANEL   3. Screening for colon cancer Z12.11 V76.51 OCCULT BLOOD, IMMUNOASSAY (FIT)   4. Need for hepatitis C screening test Z11.59 V73.89 HEPATITIS C AB     Informed patient that we will notify him when his labs return, and inform him of any change in plan of care at that time. Educated about focusing on diabetic diet, and increasing exercise, to aid in weight loss. Pt informed to return to office with worsening of symptoms, or PRN with any questions or concerns. Pt verbalizes understanding of plan of care and denies further questions or concerns at this time.

## 2017-10-13 NOTE — PATIENT INSTRUCTIONS
Learning About the [facility] Diet  What is the Mediterranean diet? The Mediterranean diet is a style of eating rather than a diet plan. It features foods eaten in [place], [place], [place] and [place], and other countries along the [facility]. It emphasizes eating foods like fish, fruits, vegetables, beans, high-fiber breads and whole grains, nuts, and olive oil. This style of eating includes limited red meat, cheese, and sweets. Why choose the Mediterranean diet? A Mediterranean-style diet may improve heart health. It contains more fat than other heart-healthy diets. But the fats are mainly from nuts, unsaturated oils (such as fish oils and olive oil), and certain nut or seed oils (such as canola, soybean, or flaxseed oil). These fats may help protect the heart and blood vessels. How can you get started on the Mediterranean diet? Here are some things you can do to switch to a more Mediterranean way of eating. What to eat  · Eat a variety of fruits and vegetables each day, such as grapes, blueberries, tomatoes, broccoli, peppers, figs, olives, spinach, eggplant, beans, lentils, and chickpeas. · Eat a variety of whole-grain foods each day, such as oats, brown rice, and whole wheat bread, pasta, and couscous. · Eat fish at least 2 times a week. Try tuna, salmon, mackerel, lake trout, herring, or sardines. · Eat moderate amounts of low-fat dairy products, such as milk, cheese, or yogurt. · Eat moderate amounts of poultry and eggs. · Choose healthy (unsaturated) fats, such as nuts, olive oil, and certain nut or seed oils like canola, soybean, and flaxseed. · Limit unhealthy (saturated) fats, such as butter, palm oil, and coconut oil. And limit fats found in animal products, such as meat and dairy products made with whole milk. Try to eat red meat only a few times a month in very small amounts. · Limit sweets and desserts to only a few times a week.  This includes sugar-sweetened drinks like soda. The Mediterranean diet may also include red wine with your meal--1 glass each day for women and up to 2 glasses a day for men. Tips for eating at home  · Use herbs, spices, garlic, lemon zest, and citrus juice instead of salt to add flavor to foods. · Add avocado slices to your sandwich instead of bonilla. · Have fish for lunch or dinner instead of red meat. Brush the fish with olive oil, and broil or grill it. · Sprinkle your salad with seeds or nuts instead of cheese. · Cook with olive or canola oil instead of butter or oils that are high in saturated fat. · Switch from 2% milk or whole milk to 1% or fat-free milk. · Dip raw vegetables in a vinaigrette dressing or hummus instead of dips made from mayonnaise or sour cream.  · Have a piece of fruit for dessert instead of a piece of cake. Try baked apples, or have some dried fruit. Tips for eating out  · Try broiled, grilled, baked, or poached fish instead of having it fried or breaded. · Ask your  to have your meals prepared with olive oil instead of butter. · Order dishes made with marinara sauce or sauces made from olive oil. Avoid sauces made from cream or mayonnaise. · Choose whole-grain breads, whole wheat pasta and pizza crust, brown rice, beans, and lentils. · Cut back on butter or margarine on bread. Instead, you can dip your bread in a small amount of olive oil. · Ask for a side salad or grilled vegetables instead of french fries or chips. Where can you learn more? Go to http://laney-leidy.info/. Enter [phone] in the search box to learn more about \"Learning About the Mediterranean Diet. \"  Current as of: December 29, 2016  Content Version: 11.3  © 2649-7685 [employer]. Care instructions adapted under license by [employer] (which disclaims liability or warranty for this information).  If you have questions about a medical condition or this instruction, always ask your healthcare professional. [address] any warranty or liability for your use of this information.

## 2017-10-13 NOTE — MR AVS SNAPSHOT
Visit Information Date & Time Provider Department Dept. Phone Encounter #  
 10/13/2017 10:15 AM Conner Vegas  [address] [phone] [id number] Follow-up Instructions Return in about 6 months (around 4/13/2018), or if symptoms worsen or fail to improve. Upcoming Health Maintenance Date Due  
 FOOT EXAM Q1 7/17/1972 Pneumococcal 19-64 Medium Risk (1 of 1 - PPSV23) 7/17/1981 DTaP/Tdap/Td series (1 - Tdap) 7/17/1983 FOBT Q 1 YEAR AGE 50-75 11/24/2017* HEMOGLOBIN A1C Q6M 2/14/2018 LIPID PANEL Q1 3/3/2018 EYE EXAM RETINAL OR DILATED Q1 3/17/2018 MICROALBUMIN Q1 10/13/2018 *Topic was postponed. The date shown is not the original due date. Allergies as of 10/13/2017  Review Complete On: 10/13/2017 By: Conner Vegas NP Severity Noted Reaction Type Reactions Vioxx [Rofecoxib]  04/25/2013    Swelling, Other (comments) SWELLING OF UVULA, DIFFICULTY BREATHING Current Immunizations  Never Reviewed No immunizations on file. Not reviewed this visit You Were Diagnosed With   
  
 Codes Comments Controlled type 2 diabetes mellitus without complication, without long-term current use of insulin ([facility]    -  Primary ICD-10-CM: E11.9 ICD-9-CM: 250.00 Pure hypercholesterolemia     ICD-10-CM: E78.00 ICD-9-CM: 272.0 Screening for colon cancer     ICD-10-CM: Z12.11 ICD-9-CM: V76.51 Need for hepatitis C screening test     ICD-10-CM: Z11.59 
ICD-9-CM: V73.89 Vitals BP Pulse Temp Resp Height(growth percentile) Weight(growth percentile) 122/82 90 98.3 °F (36.8 °C) (Oral) 18 6' (1.829 m) 274 lb (124.3 kg) SpO2 BMI Smoking Status 97% 37.16 kg/m2 Current Every Day Smoker Vitals History BMI and BSA Data Body Mass Index Body Surface Area  
 37.16 kg/m 2 2.51 m 2 [phone] Your Updated Medication List  
  
   
This list is accurate as of: 10/13/17 10:37 AM.  Always use your most recent med list.  
  
  
  
  
 CRESTOR 10 mg tablet Generic drug:  rosuvastatin Take 10 mg by mouth daily. DIOVAN 160 mg tablet Generic drug:  valsartan Take 160 mg by mouth daily. lansoprazole 30 mg capsule Commonly known as:  PREVACID Take 30 mg by mouth Daily (before breakfast). metFORMIN 500 mg tablet Commonly known as:  GLUCOPHAGE Take 1 Tab by mouth two (2) times daily (with meals). oxyCODONE IR 5 mg immediate release tablet Commonly known as:  Mabeline Kris Take 1-2 Tabs by mouth every three (3) hours as needed. Max Daily Amount: 80 mg. We Performed the Following AMB POC URINE, MICROALBUMIN, SEMIQUANT (3 RESULTS) [56441 CPT(R)] CBC W/O DIFF [44518 CPT(R)] HEMOGLOBIN A1C WITH EAG [79030 CPT(R)] HEPATITIS C AB [70998 CPT(R)] LIPID PANEL [49217 CPT(R)] METABOLIC PANEL, COMPREHENSIVE [48747 CPT(R)] OCCULT BLOOD, IMMUNOASSAY (FIT) [id number] CPT(R)] Follow-up Instructions Return in about 6 months (around 4/13/2018), or if symptoms worsen or fail to improve. Patient Instructions Learning About the [address] [facility] [employer] Diet What is the Mediterranean diet? The Mediterranean diet is a style of eating rather than a diet plan. It features foods eaten in [place], [place], [place] and [place], and other countries along the [facility]. It emphasizes eating foods like fish, fruits, vegetables, beans, high-fiber breads and whole grains, nuts, and olive oil. This style of eating includes limited red meat, cheese, and sweets. Why choose the Mediterranean diet? A Mediterranean-style diet may improve heart health. It contains more fat than other heart-healthy diets.  But the fats are mainly from nuts, unsaturated oils (such as fish oils and olive oil), and certain nut or seed oils (such as canola, soybean, or flaxseed oil). These fats may help protect the heart and blood vessels. How can you get started on the Mediterranean diet? Here are some things you can do to switch to a more Mediterranean way of eating. What to eat · Eat a variety of fruits and vegetables each day, such as grapes, blueberries, tomatoes, broccoli, peppers, figs, olives, spinach, eggplant, beans, lentils, and chickpeas. · Eat a variety of whole-grain foods each day, such as oats, brown rice, and whole wheat bread, pasta, and couscous. · Eat fish at least 2 times a week. Try tuna, salmon, mackerel, lake trout, herring, or sardines. · Eat moderate amounts of low-fat dairy products, such as milk, cheese, or yogurt. · Eat moderate amounts of poultry and eggs. · Choose healthy (unsaturated) fats, such as nuts, olive oil, and certain nut or seed oils like canola, soybean, and flaxseed. · Limit unhealthy (saturated) fats, such as butter, palm oil, and coconut oil. And limit fats found in animal products, such as meat and dairy products made with whole milk. Try to eat red meat only a few times a month in very small amounts. · Limit sweets and desserts to only a few times a week. This includes sugar-sweetened drinks like soda. The Mediterranean diet may also include red wine with your meal1 glass each day for women and up to 2 glasses a day for men. Tips for eating at home · Use herbs, spices, garlic, lemon zest, and citrus juice instead of salt to add flavor to foods. · Add avocado slices to your sandwich instead of bonilla. · Have fish for lunch or dinner instead of red meat. Brush the fish with olive oil, and broil or grill it. · Sprinkle your salad with seeds or nuts instead of cheese. · Cook with olive or canola oil instead of butter or oils that are high in saturated fat. · Switch from 2% milk or whole milk to 1% or fat-free milk. · Dip raw vegetables in a vinaigrette dressing or hummus instead of dips made from mayonnaise or sour cream. 
· Have a piece of fruit for dessert instead of a piece of cake. Try baked apples, or have some dried fruit. Tips for eating out · Try broiled, grilled, baked, or poached fish instead of having it fried or breaded. · Ask your  to have your meals prepared with olive oil instead of butter. · Order dishes made with marinara sauce or sauces made from olive oil. Avoid sauces made from cream or mayonnaise. · Choose whole-grain breads, whole wheat pasta and pizza crust, brown rice, beans, and lentils. · Cut back on butter or margarine on bread. Instead, you can dip your bread in a small amount of olive oil. · Ask for a side salad or grilled vegetables instead of french fries or chips. Where can you learn more? Go to http://laney-leidy.info/. Enter [phone] in the search box to learn more about \"Learning About the Mediterranean Diet. \" Current as of: December 29, 2016 Content Version: 11.3 © 9462-8336 [employer] Care instructions adapted under license by [employer] (which disclaims liability or warranty for this information). If you have questions about a medical condition or this instruction, always ask your healthcare professional. [place] [address] any warranty or liability for your use of this information. Introducing [facility]! Dear Christina Rosario: 
Thank you for requesting a iAgree account. Our records indicate that you already have an active iAgree account. You can access your account anytime at [email or web address] Did you know that you can access your hospital and ER discharge instructions at any time in iAgree? You can also review all of your test results from your hospital stay or ER visit. Additional Information If you have questions, please visit the Frequently Asked Questions section of the [employer] website at [email or web address] Remember, Gnodal is NOT to be used for urgent needs. For medical emergencies, dial 911. Now available from your iPhone and Android! Please provide this summary of care documentation to your next provider. Your primary care clinician is listed as Светлана Patel. If you have any questions after today's visit, please call [phone].

## 2017-10-13 NOTE — PROGRESS NOTES
Chief Complaint   Patient presents with    Medication Refill     pt is non fasting     \"REVIEWED RECORD IN PREPARATION FOR VISIT AND HAVE OBTAINED THE NECESSARY DOCUMENTATION\"  1. Have you been to the ER, urgent care clinic since your last visit? Hospitalized since your last visit? No    2. Have you seen or consulted any other health care providers outside of the [address] [place] [address] since your last visit? Include any pap smears or colon screening. No  Patient does not have advanced directives.

## 2017-10-14 LAB
ALBUMIN SERPL-MCNC: 4.6 G/DL (ref 3.5–5.5)
ALBUMIN/GLOB SERPL: 1.8 {RATIO} (ref 1.2–2.2)
ALP SERPL-CCNC: 90 IU/L (ref 39–117)
ALT SERPL-CCNC: 37 IU/L (ref 0–44)
AST SERPL-CCNC: 23 IU/L (ref 0–40)
BILIRUB SERPL-MCNC: 0.6 MG/DL (ref 0–1.2)
BUN SERPL-MCNC: 14 MG/DL (ref 6–24)
BUN/CREAT SERPL: 14 (ref 9–20)
CALCIUM SERPL-MCNC: 10 MG/DL (ref 8.7–10.2)
CHLORIDE SERPL-SCNC: 97 MMOL/L (ref 96–106)
CHOLEST SERPL-MCNC: 159 MG/DL (ref 100–199)
CO2 SERPL-SCNC: 21 MMOL/L (ref 18–29)
CREAT SERPL-MCNC: 0.98 MG/DL (ref 0.76–1.27)
ERYTHROCYTE [DISTWIDTH] IN BLOOD BY AUTOMATED COUNT: 14.3 % (ref 12.3–15.4)
EST. AVERAGE GLUCOSE BLD GHB EST-MCNC: 169 MG/DL
GLOBULIN SER CALC-MCNC: 2.6 G/DL (ref 1.5–4.5)
GLUCOSE SERPL-MCNC: 132 MG/DL (ref 65–99)
HBA1C MFR BLD: 7.5 % (ref 4.8–5.6)
HCT VFR BLD AUTO: 47.2 % (ref 37.5–51)
HCV AB S/CO SERPL IA: 0.1 S/CO RATIO (ref 0–0.9)
HDLC SERPL-MCNC: 32 MG/DL
HGB BLD-MCNC: 15.7 G/DL (ref 12.6–17.7)
INTERPRETATION, 910389: NORMAL
LDLC SERPL CALC-MCNC: 56 MG/DL (ref 0–99)
Lab: NORMAL
MCH RBC QN AUTO: 30.5 PG (ref 26.6–33)
MCHC RBC AUTO-ENTMCNC: 33.3 G/DL (ref 31.5–35.7)
MCV RBC AUTO: 92 FL (ref 79–97)
PLATELET # BLD AUTO: 227 X10E3/UL (ref 150–379)
POTASSIUM SERPL-SCNC: 4.6 MMOL/L (ref 3.5–5.2)
PROT SERPL-MCNC: 7.2 G/DL (ref 6–8.5)
RBC # BLD AUTO: 5.14 X10E6/UL (ref 4.14–5.8)
SODIUM SERPL-SCNC: 140 MMOL/L (ref 134–144)
TRIGL SERPL-MCNC: 356 MG/DL (ref 0–149)
VLDLC SERPL CALC-MCNC: 71 MG/DL (ref 5–40)
WBC # BLD AUTO: 10.7 X10E3/UL (ref 3.4–10.8)

## 2017-10-16 ENCOUNTER — TELEPHONE (OUTPATIENT)
Dept: FAMILY MEDICINE CLINIC | Age: 57
End: 2017-10-16

## 2017-10-16 DIAGNOSIS — E11.9 CONTROLLED TYPE 2 DIABETES MELLITUS WITHOUT COMPLICATION, WITHOUT LONG-TERM CURRENT USE OF INSULIN (HCC): ICD-10-CM

## 2017-10-16 DIAGNOSIS — E78.1 HYPERTRIGLYCERIDEMIA: Primary | ICD-10-CM

## 2017-10-16 RX ORDER — METFORMIN HYDROCHLORIDE 750 MG/1
750 TABLET, EXTENDED RELEASE ORAL 2 TIMES DAILY
Qty: 180 TAB | Refills: 1 | Status: SHIPPED | OUTPATIENT
Start: 2017-10-16 | End: 2018-03-17 | Stop reason: DRUGHIGH

## 2017-10-16 RX ORDER — FENOFIBRATE 145 MG/1
145 TABLET, COATED ORAL DAILY
Qty: 90 TAB | Refills: 1 | Status: SHIPPED | OUTPATIENT
Start: 2017-10-16 | End: 2018-03-27 | Stop reason: SDUPTHER

## 2017-10-16 NOTE — PROGRESS NOTES
Please call patient and let him know that his labs returned:  1. Triglycerides are too high! I have sent Tricor to the pharmacy and he should take as prescribed. In addition, he must focus on diet changes. Decrease fatty, fried foods, red meat, etc.  2.  HgbA1C has increased slightly. I am going to send increased dose of metformin to pharmacy (850mg BID), and he should take as prescribed. Again, needs to decrease carbs, sugar, etc.  Thanks!

## 2017-10-16 NOTE — TELEPHONE ENCOUNTER
----- Message from Beulah Wakefield NP sent at 10/16/2017  7:51 AM EDT -----  Please call patient and let him know that his labs returned:  1. Triglycerides are too high! I have sent Tricor to the pharmacy and he should take as prescribed. In addition, he must focus on diet changes. Decrease fatty, fried foods, red meat, etc.  2.  HgbA1C has increased slightly. I am going to send increased dose of metformin to pharmacy (850mg BID), and he should take as prescribed. Again, needs to decrease carbs, sugar, etc.  Thanks!

## 2017-10-17 NOTE — TELEPHONE ENCOUNTER
Patient is returning phone call to [facility].  Please call him at [phone]

## 2017-10-19 LAB — HEMOCCULT STL QL IA: NEGATIVE

## 2017-11-10 ENCOUNTER — PATIENT MESSAGE (OUTPATIENT)
Dept: FAMILY MEDICINE CLINIC | Age: 57
End: 2017-11-10

## 2017-11-13 RX ORDER — VALSARTAN 160 MG/1
160 TABLET ORAL DAILY
Qty: 90 TAB | Refills: 1 | Status: SHIPPED | OUTPATIENT
Start: 2017-11-13 | End: 2018-05-12 | Stop reason: SDUPTHER

## 2017-11-13 RX ORDER — LANSOPRAZOLE 30 MG/1
30 CAPSULE, DELAYED RELEASE ORAL
Qty: 90 CAP | Refills: 1 | Status: SHIPPED | OUTPATIENT
Start: 2017-11-13 | End: 2019-10-07 | Stop reason: SDUPTHER

## 2017-12-26 NOTE — TELEPHONE ENCOUNTER
Needs [facility] but also bridge [facility] [address]. Gloria Galicia contact: [phone]

## 2017-12-27 RX ORDER — ROSUVASTATIN CALCIUM 10 MG/1
10 TABLET, COATED ORAL DAILY
Qty: 90 TAB | Refills: 1 | Status: SHIPPED | OUTPATIENT
Start: 2017-12-27 | End: 2018-06-25 | Stop reason: SDUPTHER

## 2018-01-05 ENCOUNTER — OFFICE VISIT (OUTPATIENT)
Dept: FAMILY MEDICINE CLINIC | Age: 58
End: 2018-01-05

## 2018-01-05 VITALS
DIASTOLIC BLOOD PRESSURE: 86 MMHG | WEIGHT: 266.6 LBS | HEART RATE: 110 BPM | SYSTOLIC BLOOD PRESSURE: 124 MMHG | TEMPERATURE: 98.4 F | RESPIRATION RATE: 24 BRPM | OXYGEN SATURATION: 95 % | HEIGHT: 72 IN | BODY MASS INDEX: 36.11 KG/M2

## 2018-01-05 DIAGNOSIS — E11.9 CONTROLLED TYPE 2 DIABETES MELLITUS WITHOUT COMPLICATION, WITHOUT LONG-TERM CURRENT USE OF INSULIN (HCC): ICD-10-CM

## 2018-01-05 DIAGNOSIS — J11.1 INFLUENZA: Primary | ICD-10-CM

## 2018-01-05 DIAGNOSIS — R05.9 COUGH WITH FEVER: ICD-10-CM

## 2018-01-05 DIAGNOSIS — R50.9 COUGH WITH FEVER: ICD-10-CM

## 2018-01-05 LAB
QUICKVUE INFLUENZA TEST: POSITIVE
VALID INTERNAL CONTROL?: YES

## 2018-01-05 RX ORDER — ALBUTEROL SULFATE 90 UG/1
2 AEROSOL, METERED RESPIRATORY (INHALATION)
Qty: 1 INHALER | Refills: 0 | Status: SHIPPED | OUTPATIENT
Start: 2018-01-05 | End: 2018-03-12 | Stop reason: SDUPTHER

## 2018-01-05 RX ORDER — INSULIN PUMP SYRINGE, 3 ML
EACH MISCELLANEOUS
Qty: 1 KIT | Refills: 0 | Status: SHIPPED | OUTPATIENT
Start: 2018-01-05 | End: 2019-01-16

## 2018-01-05 RX ORDER — AZITHROMYCIN 250 MG/1
TABLET, FILM COATED ORAL
Qty: 6 TAB | Refills: 0 | Status: SHIPPED | OUTPATIENT
Start: 2018-01-05 | End: 2018-01-10

## 2018-01-05 NOTE — LETTER
NOTIFICATION RETURN TO WORK / SCHOOL 
 
1/5/2018 2:41 PM 
 
Mr. Indy Valdez. 
[address] [place] [id number] To Whom It May Concern: 
 
Indy Garsia is currently under the care of [address] [place] [address]. He needs to be excused from work on 1/5, due to illness. If there are questions or concerns please have the patient contact our office. Sincerely, Scot Aguero NP

## 2018-01-05 NOTE — MR AVS SNAPSHOT
Visit Information Date & Time Provider Department Dept. Phone Encounter #  
 1/5/2018  1:45 PM Emily Nguyen  [address] [phone] [id number] Follow-up Instructions Return if symptoms worsen or fail to improve. Upcoming Health Maintenance Date Due  
 FOOT EXAM Q1 7/17/1972 Pneumococcal 19-64 Medium Risk (1 of 1 - PPSV23) 7/17/1981 DTaP/Tdap/Td series (1 - Tdap) 7/17/1983 EYE EXAM RETINAL OR DILATED Q1 3/17/2018 HEMOGLOBIN A1C Q6M 4/13/2018 MICROALBUMIN Q1 10/13/2018 LIPID PANEL Q1 10/13/2018 FOBT Q 1 YEAR AGE 50-75 10/13/2018 Allergies as of 1/5/2018  Review Complete On: 1/5/2018 By: Emily Nguyen NP Severity Noted Reaction Type Reactions Vioxx [Rofecoxib]  04/25/2013    Swelling, Other (comments) SWELLING OF UVULA, DIFFICULTY BREATHING Current Immunizations  Never Reviewed No immunizations on file. Not reviewed this visit You Were Diagnosed With   
  
 Codes Comments Influenza    -  Primary ICD-10-CM: J11.1 ICD-9-CM: 133.7 Cough with fever     ICD-10-CM: R05, R50.9 ICD-9-CM: 786.2, 780.60 Vitals BP Pulse Temp Resp Height(growth percentile) Weight(growth percentile) 124/86 (!) 110 98.4 °F (36.9 °C) (Oral) 24 6' (1.829 m) 266 lb 9.6 oz (120.9 kg) SpO2 BMI Smoking Status 95% 36.16 kg/m2 Current Every Day Smoker BMI and BSA Data Body Mass Index Body Surface Area  
 36.16 kg/m 2 2.48 m 2 Preferred Pharmacy Pharmacy Name Phone [id number] [address] [facility], [address] [place] [address]  [phone] Your Updated Medication List  
  
   
This list is accurate as of: 1/5/18  2:40 PM.  Always use your most recent med list.  
  
  
  
  
 albuterol 90 mcg/actuation inhaler Commonly known as:  PROVENTIL HFA, VENTOLIN HFA, PROAIR HFA Take 2 Puffs by inhalation every four (4) hours as needed for Wheezing. azithromycin 250 mg tablet Commonly known as:  Francesca Jama Take 2 tablets today, then take 1 tablet daily  
  
 fenofibrate nanocrystallized 145 mg tablet Commonly known as:  [employer] Take 1 Tab by mouth daily. lansoprazole 30 mg capsule Commonly known as:  PREVACID Take 1 Cap by mouth Daily (before breakfast). metFORMIN  mg tablet Commonly known as:  GLUCOPHAGE XR Take 1 Tab by mouth two (2) times a day. oxyCODONE IR 5 mg immediate release tablet Commonly known as:  Lidia Laundry Take 1-2 Tabs by mouth every three (3) hours as needed. Max Daily Amount: 80 mg.  
  
 rosuvastatin 10 mg tablet Commonly known as:  CRESTOR Take 1 Tab by mouth daily. valsartan 160 mg tablet Commonly known as:  DIOVAN Take 1 Tab by mouth daily. Prescriptions Sent to Pharmacy Refills  
 azithromycin (ZITHROMAX) 250 mg tablet 0 Sig: Take 2 tablets today, then take 1 tablet daily Class: Normal  
 Pharmacy: [id number], [address] [place] [address] Dr Ph #: [phone]  
 albuterol (PROVENTIL HFA, VENTOLIN HFA, PROAIR HFA) 90 mcg/actuation inhaler 0 Sig: Take 2 Puffs by inhalation every four (4) hours as needed for Wheezing. Class: Normal  
 Pharmacy: [facility] #[id number] Ph #: [phone] Route: Inhalation We Performed the Following AMB POC RAPID INFLUENZA TEST [84269 CPT(R)] Follow-up Instructions Return if symptoms worsen or fail to improve. Patient Instructions Influenza (Flu): Care Instructions Your Care Instructions Influenza (flu) is an infection in the lungs and breathing passages. It is caused by the influenza virus. There are different strains, or types, of the flu virus from year to year. Unlike the common cold, the flu comes on suddenly and the symptoms, such as a cough, congestion, fever, chills, fatigue, aches, and pains, are more severe.  These symptoms may last up to 10 days. Although the flu can make you feel very sick, it usually doesn't cause serious health problems. Home treatment is usually all you need for flu symptoms. But your doctor may prescribe antiviral medicine to prevent other health problems, such as pneumonia, from developing. Older people and those who have a long-term health condition, such as lung disease, are most at risk for having pneumonia or other health problems. Follow-up care is a key part of your treatment and safety. Be sure to make and go to all appointments, and call your doctor if you are having problems. It's also a good idea to know your test results and keep a list of the medicines you take. How can you care for yourself at home? · Get plenty of rest. 
· Drink plenty of fluids, enough so that your urine is light yellow or clear like water. If you have kidney, heart, or liver disease and have to limit fluids, talk with your doctor before you increase the amount of fluids you drink. · Take an over-the-counter pain medicine if needed, such as acetaminophen (Tylenol), ibuprofen (Advil, Motrin), or naproxen (Aleve), to relieve fever, headache, and muscle aches. Read and follow all instructions on the label. No one younger than 20 should take aspirin. It has been linked to Reye syndrome, a serious illness. · Do not smoke. Smoking can make the flu worse. If you need help quitting, talk to your doctor about stop-smoking programs and medicines. These can increase your chances of quitting for good. · Breathe moist air from a hot shower or from a sink filled with hot water to help clear a stuffy nose. · Before you use cough and cold medicines, check the label. These medicines may not be safe for young children or for people with certain health problems. · If the skin around your nose and lips becomes sore, put some petroleum jelly on the area. · To ease coughing: ¨ Drink fluids to soothe a scratchy throat. ¨ Suck on cough drops or plain hard candy. ¨ Take an over-the-counter cough medicine that contains dextromethorphan to help you get some sleep. Read and follow all instructions on the label. ¨ Raise your head at night with an extra pillow. This may help you rest if coughing keeps you awake. · Take any prescribed medicine exactly as directed. Call your doctor if you think you are having a problem with your medicine. To avoid spreading the flu · Wash your hands regularly, and keep your hands away from your face. · Stay home from school, work, and other public places until you are feeling better and your fever has been gone for at least 24 hours. The fever needs to have gone away on its own without the help of medicine. · Ask people living with you to talk to their doctors about preventing the flu. They may get antiviral medicine to keep from getting the flu from you. · To prevent the flu in the future, get a flu vaccine every fall. Encourage people living with you to get the vaccine. · Cover your mouth when you cough or sneeze. When should you call for help? Call 911 anytime you think you may need emergency care. For example, call if: 
? · You have severe trouble breathing. ?Call your doctor now or seek immediate medical care if: 
? · You have new or worse trouble breathing. ? · You seem to be getting much sicker. ? · You feel very sleepy or confused. ? · You have a new or higher fever. ? · You get a new rash. ? Watch closely for changes in your health, and be sure to contact your doctor if: 
? · You begin to get better and then get worse. ? · You are not getting better after 1 week. Where can you learn more? Go to http://laney-leidy.info/. Enter X052 in the search box to learn more about \"Influenza (Flu): Care Instructions. \" Current as of: May 12, 2017 Content Version: 11.4 © 6215-1728 Healthwise, Incorporated.  Care instructions adapted under license by [employer]ing (which disclaims liability or warranty for this information). If you have questions about a medical condition or this instruction, always ask your healthcare professional. [address] any warranty or liability for your use of this information. Introducing [facility]! Dear Janusz Peña: 
Thank you for requesting a Incont account. Our records indicate that you already have an active Incont account. You can access your account anytime at [email or web address] Did you know that you can access your hospital and ER discharge instructions at any time in Incont? You can also review all of your test results from your hospital stay or ER visit. Additional Information If you have questions, please visit the Frequently Asked Questions section of the Incont website at [email or web address] Remember, Incont is NOT to be used for urgent needs. For medical emergencies, dial 911. Now available from your iPhone and Android! Please provide this summary of care documentation to your next provider. Your primary care clinician is listed as Светлана Patel. If you have any questions after today's visit, please call [phone].

## 2018-01-05 NOTE — PROGRESS NOTES
Identified pt with two pt identifiers(name and ). Chief Complaint   Patient presents with    Nausea     no vomiting    Diarrhea     subsiding    Generalized Body Aches     aching all over x 1 week    Cold Symptoms     Persistant, non-productive        Health Maintenance Due   Topic    FOOT EXAM Q1     Pneumococcal 19-64 Medium Risk (1 of 1 - PPSV23)    DTaP/Tdap/Td series (1 - Tdap)       Wt Readings from Last 3 Encounters:   10/13/17 274 lb (124.3 kg)   17 270 lb (122.5 kg)   17 274 lb 9.6 oz (124.6 kg)     Temp Readings from Last 3 Encounters:   10/13/17 98.3 °F (36.8 °C) (Oral)   17 98.3 °F (36.8 °C)   17 98.9 °F (37.2 °C) (Oral)     BP Readings from Last 3 Encounters:   10/13/17 122/82   17 134/81   17 130/79     Pulse Readings from Last 3 Encounters:   10/13/17 90   17 85   17 93         Learning Assessment:  :     Learning Assessment 2017   PRIMARY LEARNER Patient   BARRIERS PRIMARY LEARNER NONE   CO-LEARNER CAREGIVER No   PRIMARY LANGUAGE ENGLISH   LEARNER PREFERENCE PRIMARY DEMONSTRATION     LISTENING   ANSWERED BY patient   RELATIONSHIP SELF       Depression Screening:  :     PHQ over the last two weeks 2018   Little interest or pleasure in doing things Not at all   Feeling down, depressed or hopeless Not at all   Total Score PHQ 2 0       Fall Risk Assessment:  :     No flowsheet data found. Abuse Screening:  :     No flowsheet data found. Coordination of Care Questionnaire:  :     1) Have you been to an emergency room, urgent care clinic since your last visit? no   Hospitalized since your last visit? no             2) Have you seen or consulted any other health care providers outside of [address] [place] [address] since your last visit? no  (Include any pap smears or colon screenings in this section.)    3) Do you have an Advance Directive on file? no  Are you interested in receiving information about Advance Directives? no    Reviewed record in preparation for visit and have obtained necessary documentation. Medication reconciliation up to date and corrected with patient at this time. Complains of flu like symptoms; cough, nausea, having periods of sweating and then chills x 1week.

## 2018-01-05 NOTE — PATIENT INSTRUCTIONS

## 2018-01-05 NOTE — PROGRESS NOTES
HISTORY OF PRESENT ILLNESS  Lauren Sparks is a 56 y.o. male. HPI  Pt presents with \"nausea, diarrhea, body aches and cold symptoms\"    Symptoms started Sunday (5 days ago)  Cough  Coughing up mucous sometimes, but mostly a dry cough  Body aches  Diarrhea  Nausea, but no vomiting  Has felt feverish off and on    OTC: Mucinex    Also,patient is requesting blood glucose monitor to monitor blood sugars. Review of Systems   Constitutional: Positive for fever and malaise/fatigue. HENT: Positive for congestion. Respiratory: Positive for cough and sputum production. Gastrointestinal: Positive for diarrhea and nausea. Negative for abdominal pain and vomiting. Physical Exam   Constitutional: He is oriented to person, place, and time. He appears well-developed and well-nourished. HENT:   Head: Normocephalic and atraumatic. Right Ear: Hearing, tympanic membrane, external ear and ear canal normal.   Left Ear: Hearing, tympanic membrane, external ear and ear canal normal.   Nose: Mucosal edema and rhinorrhea present. Mouth/Throat: Oropharynx is clear and moist.   Neck: Normal range of motion. Neck supple. Cardiovascular: Normal rate, regular rhythm and normal heart sounds. Pulmonary/Chest: Effort normal. He has wheezes in the right upper field and the left upper field. He has rhonchi in the right middle field, the right lower field, the left middle field and the left lower field. Lymphadenopathy:     He has no cervical adenopathy. Neurological: He is alert and oriented to person, place, and time. Skin: Skin is warm and dry. Psychiatric: He has a normal mood and affect. His behavior is normal.       ASSESSMENT and PLAN    ICD-10-CM ICD-9-CM    1. Influenza J11.1 487.1    2. Cough with fever R05 786.2 AMB POC RAPID INFLUENZA TEST    R50.9 780.60 azithromycin (ZITHROMAX) 250 mg tablet      albuterol (PROVENTIL HFA, VENTOLIN HFA, PROAIR HFA) 90 mcg/actuation inhaler   3.  Controlled type 2 diabetes mellitus without complication, without long-term current use of insulin (HCC) E11.9 250.00 Blood-Glucose Meter (ONETOUCH ULTRAMINI) monitoring kit     Informed patient that I have sent medication to the pharmacy, and he should take as prescribed. Educated about staying well hydrated, and treating fever as needed. Pt informed to return to office with worsening of symptoms, or PRN with any questions or concerns. Pt verbalizes understanding of plan of care and denies further questions or concerns at this time.

## 2018-01-08 DIAGNOSIS — E11.9 CONTROLLED TYPE 2 DIABETES MELLITUS WITHOUT COMPLICATION, WITHOUT LONG-TERM CURRENT USE OF INSULIN (HCC): Primary | ICD-10-CM

## 2018-01-08 RX ORDER — LANCETS
EACH MISCELLANEOUS
Qty: 1 EACH | Refills: 11 | Status: SHIPPED | OUTPATIENT
Start: 2018-01-08

## 2018-01-29 ENCOUNTER — HOSPITAL ENCOUNTER (EMERGENCY)
Age: 58
Discharge: HOME | End: 2018-01-29
Attending: EMERGENCY MEDICINE
Payer: COMMERCIAL

## 2018-01-29 ENCOUNTER — APPOINTMENT (OUTPATIENT)
Dept: GENERAL RADIOLOGY | Age: 58
End: 2018-01-29
Attending: EMERGENCY MEDICINE
Payer: COMMERCIAL

## 2018-01-29 VITALS
DIASTOLIC BLOOD PRESSURE: 95 MMHG | RESPIRATION RATE: 18 BRPM | HEIGHT: 72 IN | SYSTOLIC BLOOD PRESSURE: 141 MMHG | BODY MASS INDEX: 37.06 KG/M2 | HEART RATE: 89 BPM | WEIGHT: 273.59 LBS | OXYGEN SATURATION: 94 % | TEMPERATURE: 99 F

## 2018-01-29 DIAGNOSIS — J20.9 ACUTE BRONCHITIS, UNSPECIFIED ORGANISM: Primary | ICD-10-CM

## 2018-01-29 DIAGNOSIS — B35.3 TINEA PEDIS OF RIGHT FOOT: ICD-10-CM

## 2018-01-29 LAB
ANION GAP SERPL CALC-SCNC: 10 MMOL/L (ref 5–15)
ATRIAL RATE: 81 BPM
BASOPHILS # BLD: 0.1 K/UL (ref 0–0.1)
BASOPHILS NFR BLD: 1 % (ref 0–1)
BUN SERPL-MCNC: 13 MG/DL (ref 6–20)
BUN/CREAT SERPL: 13 (ref 12–20)
CALCIUM SERPL-MCNC: 9 MG/DL (ref 8.5–10.1)
CALCULATED P AXIS, ECG09: 11 DEGREES
CALCULATED R AXIS, ECG10: -7 DEGREES
CALCULATED T AXIS, ECG11: 59 DEGREES
CHLORIDE SERPL-SCNC: 105 MMOL/L (ref 97–108)
CO2 SERPL-SCNC: 25 MMOL/L (ref 21–32)
CREAT SERPL-MCNC: 0.98 MG/DL (ref 0.7–1.3)
DIAGNOSIS, 93000: NORMAL
DIFFERENTIAL METHOD BLD: NORMAL
EOSINOPHIL # BLD: 0.2 K/UL (ref 0–0.4)
EOSINOPHIL NFR BLD: 2 % (ref 0–7)
ERYTHROCYTE [DISTWIDTH] IN BLOOD BY AUTOMATED COUNT: 14.4 % (ref 11.5–14.5)
GLUCOSE SERPL-MCNC: 134 MG/DL (ref 65–100)
HCT VFR BLD AUTO: 47 % (ref 36.6–50.3)
HGB BLD-MCNC: 15.2 G/DL (ref 12.1–17)
LYMPHOCYTES # BLD: 2.9 K/UL
LYMPHOCYTES NFR BLD: 34 % (ref 12–49)
MCH RBC QN AUTO: 29.9 PG (ref 26–34)
MCHC RBC AUTO-ENTMCNC: 32.3 G/DL (ref 30–36.5)
MCV RBC AUTO: 92.3 FL (ref 80–99)
MONOCYTES # BLD: 0.8 K/UL (ref 0–1)
MONOCYTES NFR BLD: 9 % (ref 5–13)
NEUTS SEG # BLD: 4.5 K/UL (ref 1.8–8)
NEUTS SEG NFR BLD: 54 % (ref 32–75)
P-R INTERVAL, ECG05: 158 MS
PLATELET # BLD AUTO: 234 K/UL (ref 150–400)
PMV BLD AUTO: 11.3 FL (ref 8.9–12.9)
POTASSIUM SERPL-SCNC: 4.2 MMOL/L (ref 3.5–5.1)
Q-T INTERVAL, ECG07: 382 MS
QRS DURATION, ECG06: 84 MS
QTC CALCULATION (BEZET), ECG08: 443 MS
RBC # BLD AUTO: 5.09 M/UL (ref 4.1–5.7)
SODIUM SERPL-SCNC: 140 MMOL/L (ref 136–145)
TROPONIN I SERPL-MCNC: 0.05 NG/ML
VENTRICULAR RATE, ECG03: 81 BPM
WBC # BLD AUTO: 8.5 K/UL (ref 4.1–11.1)
XXWBCSUS: 0

## 2018-01-29 PROCEDURE — 85025 COMPLETE CBC W/AUTO DIFF WBC: CPT | Performed by: EMERGENCY MEDICINE

## 2018-01-29 PROCEDURE — 94762 N-INVAS EAR/PLS OXIMTRY CONT: CPT

## 2018-01-29 PROCEDURE — 94640 AIRWAY INHALATION TREATMENT: CPT

## 2018-01-29 PROCEDURE — 99284 EMERGENCY DEPT VISIT MOD MDM: CPT

## 2018-01-29 PROCEDURE — 84484 ASSAY OF TROPONIN QUANT: CPT | Performed by: EMERGENCY MEDICINE

## 2018-01-29 PROCEDURE — 77030013140 HC MSK NEB VYRM -A

## 2018-01-29 PROCEDURE — 93005 ELECTROCARDIOGRAM TRACING: CPT

## 2018-01-29 PROCEDURE — 36415 COLL VENOUS BLD VENIPUNCTURE: CPT | Performed by: EMERGENCY MEDICINE

## 2018-01-29 PROCEDURE — 80048 BASIC METABOLIC PNL TOTAL CA: CPT | Performed by: EMERGENCY MEDICINE

## 2018-01-29 PROCEDURE — 74011000250 HC RX REV CODE- 250: Performed by: EMERGENCY MEDICINE

## 2018-01-29 PROCEDURE — 71046 X-RAY EXAM CHEST 2 VIEWS: CPT

## 2018-01-29 RX ORDER — BENZONATATE 100 MG/1
100 CAPSULE ORAL
Qty: 21 CAP | Refills: 0 | Status: SHIPPED | OUTPATIENT
Start: 2018-01-29 | End: 2018-02-05

## 2018-01-29 RX ORDER — CODEINE PHOSPHATE AND GUAIFENESIN 10; 100 MG/5ML; MG/5ML
5 SOLUTION ORAL
Qty: 60 ML | Refills: 0 | Status: SHIPPED | OUTPATIENT
Start: 2018-01-29 | End: 2018-02-02

## 2018-01-29 RX ORDER — IPRATROPIUM BROMIDE AND ALBUTEROL SULFATE 2.5; .5 MG/3ML; MG/3ML
3 SOLUTION RESPIRATORY (INHALATION)
Status: COMPLETED | OUTPATIENT
Start: 2018-01-29 | End: 2018-01-29

## 2018-01-29 RX ORDER — PREDNISONE 20 MG/1
40 TABLET ORAL DAILY
Qty: 8 TAB | Refills: 0 | Status: SHIPPED | OUTPATIENT
Start: 2018-01-29 | End: 2018-02-02

## 2018-01-29 RX ORDER — CHLORPHENIRAMINE MALEATE 4 MG
TABLET ORAL 2 TIMES DAILY
Qty: 1 TUBE | Refills: 0 | Status: SHIPPED | OUTPATIENT
Start: 2018-01-29 | End: 2018-02-28

## 2018-01-29 RX ADMIN — IPRATROPIUM BROMIDE AND ALBUTEROL SULFATE 3 ML: .5; 3 SOLUTION RESPIRATORY (INHALATION) at 10:14

## 2018-01-29 NOTE — ED TRIAGE NOTES
Pt ambulated to the treatment area with a steady gait. Pt states \"I had the flu 3 weeks ago. I still have a congested cough clear sputum no fevers. \" Pt appears in no distress.

## 2018-01-29 NOTE — ED NOTES
Plan of care and all test/meds ordered explained to pt. Good understanding and agreement with plan was verbalized. Breathing treatment in progress.

## 2018-01-29 NOTE — LETTER
[facility] EMERGENCY DEPT 
[address] [address] [address] [address] 
[phone] Work/School Note Date: 1/29/2018 To Whom It May concern: 
 
Opal Rothman. was seen and treated today in the emergency room by the following provider(s): 
Attending Provider: Leonarda Medrano MD.   
 
 
 
Sincerely, 
 
 
 
 
Vasquez Contreras RN

## 2018-01-29 NOTE — DISCHARGE INSTRUCTIONS
Bronchitis: Care Instructions  Your Care Instructions    Bronchitis is inflammation of the bronchial tubes, which carry air to the lungs. The tubes swell and produce mucus, or phlegm. The mucus and inflamed bronchial tubes make you cough. You may have trouble breathing. Most cases of bronchitis are caused by viruses like those that cause colds. Antibiotics usually do not help and they may be harmful. Bronchitis usually develops rapidly and lasts about 2 to 3 weeks in otherwise healthy people. Follow-up care is a key part of your treatment and safety. Be sure to make and go to all appointments, and call your doctor if you are having problems. It's also a good idea to know your test results and keep a list of the medicines you take. How can you care for yourself at home? · Take all medicines exactly as prescribed. Call your doctor if you think you are having a problem with your medicine. · Get some extra rest.  · Take an over-the-counter pain medicine, such as acetaminophen (Tylenol), ibuprofen (Advil, Motrin), or naproxen (Aleve) to reduce fever and relieve body aches. Read and follow all instructions on the label. · Do not take two or more pain medicines at the same time unless the doctor told you to. Many pain medicines have acetaminophen, which is Tylenol. Too much acetaminophen (Tylenol) can be harmful. · Take an over-the-counter cough medicine that contains dextromethorphan to help quiet a dry, hacking cough so that you can sleep. Avoid cough medicines that have more than one active ingredient. Read and follow all instructions on the label. · Breathe moist air from a humidifier, hot shower, or sink filled with hot water. The heat and moisture will thin mucus so you can cough it out. · Do not smoke. Smoking can make bronchitis worse. If you need help quitting, talk to your doctor about stop-smoking programs and medicines. These can increase your chances of quitting for good.   When should you call for help? Call 911 anytime you think you may need emergency care. For example, call if:  ? · You have severe trouble breathing. ?Call your doctor now or seek immediate medical care if:  ? · You have new or worse trouble breathing. ? · You cough up dark brown or bloody mucus (sputum). ? · You have a new or higher fever. ? · You have a new rash. ? Watch closely for changes in your health, and be sure to contact your doctor if:  ? · You cough more deeply or more often, especially if you notice more mucus or a change in the color of your mucus. ? · You are not getting better as expected. Where can you learn more? Go to http://laney-leidy.info/. Enter H333 in the search box to learn more about \"Bronchitis: Care Instructions. \"  Current as of: May 12, 2017  Content Version: 11.4  © 6169-4800 ROIÂ². Care instructions adapted under license by [employer] (which disclaims liability or warranty for this information). If you have questions about a medical condition or this instruction, always ask your healthcare professional. [place] [address] any warranty or liability for your use of this information. Athlete's Foot: Care Instructions  Your Care Instructions    Athlete's foot is an itchy rash on the foot caused by an infection with a fungus. You can get it by going barefoot in wet public areas, such as swimming pools or locker rooms. Many times there is no clear reason why you get athlete's foot. You can easily treat athlete's foot by putting medicine on your feet for 1 to 6 weeks. In some cases, a doctor may prescribe pills to kill the fungus. Follow-up care is a key part of your treatment and safety. Be sure to make and go to all appointments, and call your doctor if you are having problems. It's also a good idea to know your test results and keep a list of the medicines you take. How can you care for yourself at home?   · Your doctor may suggest an over-the counter lotion or spray or may prescribe a medicine. Take your medicines exactly as prescribed. Call your doctor if you think you are having a problem with your medicine. · Keep your feet clean and dry. · When you get dressed, put your socks on before your underwear. This can prevent the fungus from spreading from your feet to your groin. To prevent athlete's foot  · Wear flip-flops or other shower sandals in public locker rooms and showers and by the pool. · Dry between your toes after swimming or bathing. · Wear leather shoes or sandals, which let air get to your feet. · Change your socks as needed so your feet stay as dry as possible. · Use antifungal powder on your feet. When should you call for help? Watch closely for changes in your health, and be sure to contact your doctor if:  · You do not get better as expected. Where can you learn more? Go to http://laney-leidy.info/. Enter M498 in the search box to learn more about \"Athlete's Foot: Care Instructions. \"  Current as of: October 13, 2016  Content Version: 11.4  © 2374-0423 [employer]. Care instructions adapted under license by [employer] (which disclaims liability or warranty for this information). If you have questions about a medical condition or this instruction, always ask your healthcare professional. [address] any warranty or liability for your use of this information.

## 2018-01-29 NOTE — LETTER
[facility] EMERGENCY DEPT 
[address] [place] [address] [address] [address] 
[phone] Work/School Note Date: 1/29/2018 To Whom It May concern: 
 
Whitney Lares was seen and treated today in the emergency room by the following provider(s): 
Attending Provider: Merry Rueda MD. Whitney Lares   
Sincerely, 
 
 
 
 
Dayday Kaur RN

## 2018-03-12 ENCOUNTER — OFFICE VISIT (OUTPATIENT)
Dept: FAMILY MEDICINE CLINIC | Age: 58
End: 2018-03-12

## 2018-03-12 VITALS
OXYGEN SATURATION: 96 % | SYSTOLIC BLOOD PRESSURE: 138 MMHG | WEIGHT: 278 LBS | HEIGHT: 72 IN | HEART RATE: 88 BPM | RESPIRATION RATE: 20 BRPM | DIASTOLIC BLOOD PRESSURE: 60 MMHG | TEMPERATURE: 97.7 F | BODY MASS INDEX: 37.65 KG/M2

## 2018-03-12 DIAGNOSIS — E78.00 PURE HYPERCHOLESTEROLEMIA: ICD-10-CM

## 2018-03-12 DIAGNOSIS — E55.9 VITAMIN D DEFICIENCY: ICD-10-CM

## 2018-03-12 DIAGNOSIS — I10 ESSENTIAL HYPERTENSION: ICD-10-CM

## 2018-03-12 DIAGNOSIS — E11.9 CONTROLLED TYPE 2 DIABETES MELLITUS WITHOUT COMPLICATION, WITHOUT LONG-TERM CURRENT USE OF INSULIN (HCC): ICD-10-CM

## 2018-03-12 DIAGNOSIS — J98.01 BRONCHOSPASM: Primary | ICD-10-CM

## 2018-03-12 DIAGNOSIS — J41.0 SIMPLE CHRONIC BRONCHITIS (HCC): ICD-10-CM

## 2018-03-12 DIAGNOSIS — G47.33 OBSTRUCTIVE SLEEP APNEA: ICD-10-CM

## 2018-03-12 DIAGNOSIS — R09.81 SINUS CONGESTION: ICD-10-CM

## 2018-03-12 DIAGNOSIS — Z79.899 ENCOUNTER FOR LONG-TERM CURRENT USE OF MEDICATION: ICD-10-CM

## 2018-03-12 RX ORDER — FLUTICASONE PROPIONATE 50 MCG
SPRAY, SUSPENSION (ML) NASAL
Qty: 1 BOTTLE | Refills: 5 | Status: SHIPPED | OUTPATIENT
Start: 2018-03-12 | End: 2019-01-16 | Stop reason: ALTCHOICE

## 2018-03-12 RX ORDER — OMEPRAZOLE 20 MG/1
CAPSULE, DELAYED RELEASE ORAL
COMMUNITY
Start: 2018-02-14 | End: 2018-03-12 | Stop reason: ALTCHOICE

## 2018-03-12 RX ORDER — BUDESONIDE AND FORMOTEROL FUMARATE DIHYDRATE 160; 4.5 UG/1; UG/1
2 AEROSOL RESPIRATORY (INHALATION) 2 TIMES DAILY
Qty: 1 INHALER | Refills: 2 | Status: SHIPPED | OUTPATIENT
Start: 2018-03-12 | End: 2019-01-16 | Stop reason: ALTCHOICE

## 2018-03-12 RX ORDER — ALBUTEROL SULFATE 90 UG/1
2 AEROSOL, METERED RESPIRATORY (INHALATION)
Qty: 1 INHALER | Refills: 0 | Status: SHIPPED | OUTPATIENT
Start: 2018-03-12 | End: 2020-05-01 | Stop reason: SDUPTHER

## 2018-03-12 NOTE — PATIENT INSTRUCTIONS
Diabetes Foot Health: Care Instructions  Your Care Instructions    When you have diabetes, your feet need extra care and attention. Diabetes can damage the nerve endings and blood vessels in your feet, making you less likely to notice when your feet are injured. Diabetes also limits your body's ability to fight infection and get blood to areas that need it. If you get a minor foot injury, it could become an ulcer or a serious infection. With good foot care, you can prevent most of these problems. Caring for your feet can be quick and easy. Most of the care can be done when you are bathing or getting ready for bed. Follow-up care is a key part of your treatment and safety. Be sure to make and go to all appointments, and call your doctor if you are having problems. It's also a good idea to know your test results and keep a list of the medicines you take. How can you care for yourself at home? · Keep your blood sugar close to normal by watching what and how much you eat, monitoring blood sugar, taking medicines if prescribed, and getting regular exercise. · Do not smoke. Smoking affects blood flow and can make foot problems worse. If you need help quitting, talk to your doctor about stop-smoking programs and medicines. These can increase your chances of quitting for good. · Eat a diet that is low in fats. High fat intake can cause fat to build up in your blood vessels and decrease blood flow. · Inspect your feet daily for blisters, cuts, cracks, or sores. If you cannot see well, use a mirror or have someone help you. · Take care of your feet:  [facility] your feet every day. Use warm (not hot) water. Check the water temperature with your wrists or other part of your body, not your feet. ¨ Dry your feet well. Pat them dry. Do not rub the skin on your feet too hard. Dry well between your toes. If the skin on your feet stays moist, bacteria or a fungus can grow, which can lead to infection.   ¨ Keep your skin soft. Use moisturizing skin cream to keep the skin on your feet soft and prevent calluses and cracks. But do not put the cream between your toes, and stop using any cream that causes a rash. ¨ Clean underneath your toenails carefully. Do not use a sharp object to clean underneath your toenails. Use the blunt end of a nail file or other rounded tool. ¨ Trim and file your toenails straight across to prevent ingrown toenails. Use a nail clipper, not scissors. Use an emery board to smooth the edges. · Change socks daily. Socks without seams are best, because seams often rub the feet. You can find socks for people with diabetes from specialty catalogs. · Look inside your shoes every day for things like gravel or torn linings, which could cause blisters or sores. · Buy shoes that fit well:  ¨ Look for shoes that have plenty of space around the toes. This helps prevent bunions and blisters. ¨ Try on shoes while wearing the kind of socks you will usually wear with the shoes. ¨ Avoid plastic shoes. They may rub your feet and cause blisters. Good shoes should be made of materials that are flexible and breathable, such as leather or cloth. ¨ Break in new shoes slowly by wearing them for no more than an hour a day for several days. Take extra time to check your feet for red areas, blisters, or other problems after you wear new shoes. · Do not go barefoot. Do not wear sandals, and do not wear shoes with very thin soles. Thin soles are easy to puncture. They also do not protect your feet from hot pavement or cold weather. · Have your doctor check your feet during each visit. If you have a foot problem, see your doctor. Do not try to treat an early foot problem at home. Home remedies or treatments that you can buy without a prescription (such as corn removers) can be harmful. · Always get early treatment for foot problems. A minor irritation can lead to a major problem if not properly cared for early.   When should you call for help? Call your doctor now or seek immediate medical care if:  ? · You have a foot sore, an ulcer or break in the skin that is not healing after 4 days, bleeding corns or calluses, or an ingrown toenail. ? · You have blue or black areas, which can mean bruising or blood flow problems. ? · You have peeling skin or tiny blisters between your toes or cracking or oozing of the skin. ? · You have a fever for more than 24 hours and a foot sore. ? · You have new numbness or tingling in your feet that does not go away after you move your feet or change positions. ? · You have unexplained or unusual swelling of the foot or ankle. ? Watch closely for changes in your health, and be sure to contact your doctor if:  ? · You cannot do proper foot care. Where can you learn more? Go to http://laney-leidy.info/. Enter A739 in the search box to learn more about \"Diabetes Foot Health: Care Instructions. \"  Current as of: March 13, 2017  Content Version: 11.4  © 4866-4432 [employer]. Care instructions adapted under license by [employer] (which disclaims liability or warranty for this information). If you have questions about a medical condition or this instruction, always ask your healthcare professional. [address] any warranty or liability for your use of this information. Body Mass Index: Care Instructions  Your Care Instructions    Body mass index (BMI) can help you see if your weight is raising your risk for health problems. It uses a formula to compare how much you weigh with how tall you are. · A BMI lower than 18.5 is considered underweight. · A BMI between 18.5 and 24.9 is considered healthy. · A BMI between 25 and 29.9 is considered overweight. A BMI of 30 or higher is considered obese. If your BMI is in the normal range, it means that you have a lower risk for weight-related health problems.  If your BMI is in the overweight or obese range, you may be at increased risk for weight-related health problems, such as high blood pressure, heart disease, stroke, arthritis or joint pain, and diabetes. If your BMI is in the underweight range, you may be at increased risk for health problems such as fatigue, lower protection (immunity) against illness, muscle loss, bone loss, hair loss, and hormone problems. BMI is just one measure of your risk for weight-related health problems. You may be at higher risk for health problems if you are not active, you eat an unhealthy diet, or you drink too much alcohol or use tobacco products. Follow-up care is a key part of your treatment and safety. Be sure to make and go to all appointments, and call your doctor if you are having problems. It's also a good idea to know your test results and keep a list of the medicines you take. How can you care for yourself at home? · Practice healthy eating habits. This includes eating plenty of fruits, vegetables, whole grains, lean protein, and low-fat dairy. · If your doctor recommends it, get more exercise. Walking is a good choice. Bit by bit, increase the amount you walk every day. Try for at least 30 minutes on most days of the week. · Do not smoke. Smoking can increase your risk for health problems. If you need help quitting, talk to your doctor about stop-smoking programs and medicines. These can increase your chances of quitting for good. · Limit alcohol to 2 drinks a day for men and 1 drink a day for women. Too much alcohol can cause health problems. If you have a BMI higher than 25  · Your doctor may do other tests to check your risk for weight-related health problems. This may include measuring the distance around your waist. A waist measurement of more than 40 inches in men or 35 inches in women can increase the risk of weight-related health problems. · Talk with your doctor about steps you can take to stay healthy or improve your health. You may need to make lifestyle changes to lose weight and stay healthy, such as changing your diet and getting regular exercise. If you have a BMI lower than 18.5  · Your doctor may do other tests to check your risk for health problems. · Talk with your doctor about steps you can take to stay healthy or improve your health. You may need to make lifestyle changes to gain or maintain weight and stay healthy, such as getting more healthy foods in your diet and doing exercises to build muscle. Where can you learn more? Go to http://laney-leidy.info/. Enter S176 in the search box to learn more about \"Body Mass Index: Care Instructions. \"  Current as of: October 13, 2016  Content Version: 11.4  © 9787-1002 [employer], [employer] Care instructions adapted under license by [employer] (which disclaims liability or warranty for this information). If you have questions about a medical condition or this instruction, always ask your healthcare professional. [address] any warranty or liability for your use of this information.

## 2018-03-12 NOTE — MR AVS SNAPSHOT
[address] [place] [address] [address] [facility] 
[phone] Patient: Timbo Pena. MRN: [id number] RIU:[birth year] Visit Information Date & Time Provider Department Dept. Phone Encounter #  
 4/81/1024  1:02 PM Juan Gonzales [phone] [id number] Upcoming Health Maintenance Date Due  
 FOOT EXAM Q1 7/17/1972 Pneumococcal 19-64 Medium Risk (1 of 1 - PPSV23) 7/17/1981 DTaP/Tdap/Td series (1 - Tdap) 7/17/1983 EYE EXAM RETINAL OR DILATED Q1 3/17/2018 HEMOGLOBIN A1C Q6M 4/13/2018 MICROALBUMIN Q1 10/13/2018 LIPID PANEL Q1 10/13/2018 FOBT Q 1 YEAR AGE 50-75 10/13/2018 Allergies as of 3/12/2018  Review Complete On: 2/05/8935 By: Alycia Morales MD  
  
 Severity Noted Reaction Type Reactions Vioxx [Rofecoxib]  04/25/2013    Swelling, Other (comments) SWELLING OF UVULA, DIFFICULTY BREATHING Current Immunizations  Never Reviewed No immunizations on file. Not reviewed this visit You Were Diagnosed With   
  
 Codes Comments Bronchospasm    -  Primary ICD-10-CM: J98.01 
ICD-9-CM: 519.11 Simple chronic bronchitis (HCC)     ICD-10-CM: J41.0 ICD-9-CM: 491.0 Obstructive sleep apnea     ICD-10-CM: G47.33 
ICD-9-CM: 327.23 Sinus congestion     ICD-10-CM: R09.81 ICD-9-CM: 478.19 Controlled type 2 diabetes mellitus without complication, without long-term current use of insulin ([facility]     ICD-10-CM: E11.9 ICD-9-CM: 250.00 Pure hypercholesterolemia     ICD-10-CM: E78.00 ICD-9-CM: 272.0 Essential hypertension     ICD-10-CM: I10 
ICD-9-CM: 401.9 Encounter for long-term current use of medication     ICD-10-CM: Z79.899 ICD-9-CM: V58.69 Vitamin D deficiency     ICD-10-CM: E55.9 ICD-9-CM: 268.9 Vitals BP Pulse Temp Resp Height(growth percentile) Weight(growth percentile) 138/60 (BP 1 Location: Right arm, BP Patient Position: Sitting) 88 97.7 °F (36.5 °C) (Oral) 20 6' (1.829 m) 278 lb (126.1 kg) SpO2 BMI Smoking Status 96% 37.7 kg/m2 Current Every Day Smoker Vitals History BMI and BSA Data Body Mass Index Body Surface Area 37.7 kg/m 2 2.53 m 2 Preferred Pharmacy Pharmacy Name Phone [facility]Y #[id number]- [address] , [id number] [place] [address]  [phone] Your Updated Medication List  
  
   
This list is accurate as of 3/12/18  5:06 PM.  Always use your most recent med list.  
  
  
  
  
 albuterol 90 mcg/actuation inhaler Commonly known as:  PROVENTIL HFA, VENTOLIN HFA, PROAIR HFA Take 2 Puffs by inhalation every four (4) hours as needed for Wheezing. Blood-Glucose Meter monitoring kit Commonly known as:  Jenniffer Obrien To check blood sugars once daily. Dx: E11.9.  
  
 budesonide-formoterol 160-4.5 mcg/actuation Hfaa Commonly known as:  SYMBICORT Take 2 Puffs by inhalation two (2) times a day. Gargle after use. fenofibrate nanocrystallized 145 mg tablet Commonly known as:  [employer] Take 1 Tab by mouth daily. fluticasone 50 mcg/actuation nasal spray Commonly known as:  FLONASE  
2 sprays per nostril once a day  
  
 glucose blood VI test strips strip Commonly known as:  ASCENSIA AUTODISC VI, ONE TOUCH ULTRA TEST VI Dispense One Touch Ultra test strips. To check blood sugar once daily. Dx: E11.9  
  
 inhalational spacing device Commonly known as:  E-Z SPACER  
1 Each by Does Not Apply route as needed. Lancets Misc Dispense Delica lancets. To test blood sugar once daily. Dx: E11.9  
  
 lansoprazole 30 mg capsule Commonly known as:  PREVACID Take 1 Cap by mouth Daily (before breakfast). metFORMIN  mg tablet Commonly known as:  GLUCOPHAGE XR Take 1 Tab by mouth two (2) times a day. rosuvastatin 10 mg tablet Commonly known as:  CRESTOR  
 Take 1 Tab by mouth daily. valsartan 160 mg tablet Commonly known as:  DIOVAN Take 1 Tab by mouth daily. Prescriptions Sent to Pharmacy Refills  
 albuterol (PROVENTIL HFA, VENTOLIN HFA, PROAIR HFA) 90 mcg/actuation inhaler 0 Sig: Take 2 Puffs by inhalation every four (4) hours as needed for Wheezing. Class: Normal  
 Pharmacy: [facility]/pharmacy #[id number]- [address] Ph #: [phone] Route: Inhalation  
 budesonide-formoterol (SYMBICORT) 160-4.5 mcg/actuation HFAA 2 Sig: Take 2 Puffs by inhalation two (2) times a day. Gargle after use. Class: Normal  
 Pharmacy: [facility]/pharmacy #[id number]- [address] Ph #: [phone] Route: Inhalation  
 fluticasone (FLONASE) 50 mcg/actuation nasal spray 5 Si sprays per nostril once a day Class: Normal  
 Pharmacy: [facility]/pharmacy #[id number]- [address] Ph #: [phone] We Performed the Following CBC WITH AUTOMATED DIFF [07943 CPT(R)] HEMOGLOBIN A1C WITH EAG [99066 CPT(R)] LIPID PANEL [56528 CPT(R)] METABOLIC PANEL, COMPREHENSIVE [75980 CPT(R)] TSH 3RD GENERATION [57183 CPT(R)] VITAMIN D, 25 HYDROXY [id number] CPT(R)] Patient Instructions Diabetes Foot Health: Care Instructions Your Care Instructions When you have diabetes, your feet need extra care and attention. Diabetes can damage the nerve endings and blood vessels in your feet, making you less likely to notice when your feet are injured. Diabetes also limits your body's ability to fight infection and get blood to areas that need it. If you get a minor foot injury, it could become an ulcer or a serious infection. With good foot care, you can prevent most of these problems. Caring for your feet can be quick and easy. Most of the care can be done when you are bathing or getting ready for bed. Follow-up care is a key part of your treatment and safety.  Be sure to make and go to all appointments, and call your doctor if you are having problems. It's also a good idea to know your test results and keep a list of the medicines you take. How can you care for yourself at home? · Keep your blood sugar close to normal by watching what and how much you eat, monitoring blood sugar, taking medicines if prescribed, and getting regular exercise. · Do not smoke. Smoking affects blood flow and can make foot problems worse. If you need help quitting, talk to your doctor about stop-smoking programs and medicines. These can increase your chances of quitting for good. · Eat a diet that is low in fats. High fat intake can cause fat to build up in your blood vessels and decrease blood flow. · Inspect your feet daily for blisters, cuts, cracks, or sores. If you cannot see well, use a mirror or have someone help you. · Take care of your feet: 
[facility] your feet every day. Use warm (not hot) water. Check the water temperature with your wrists or other part of your body, not your feet. ¨ Dry your feet well. Pat them dry. Do not rub the skin on your feet too hard. Dry well between your toes. If the skin on your feet stays moist, bacteria or a fungus can grow, which can lead to infection. ¨ Keep your skin soft. Use moisturizing skin cream to keep the skin on your feet soft and prevent calluses and cracks. But do not put the cream between your toes, and stop using any cream that causes a rash. ¨ Clean underneath your toenails carefully. Do not use a sharp object to clean underneath your toenails. Use the blunt end of a nail file or other rounded tool. ¨ Trim and file your toenails straight across to prevent ingrown toenails. Use a nail clipper, not scissors. Use an emery board to smooth the edges. · Change socks daily. Socks without seams are best, because seams often rub the feet. You can find socks for people with diabetes from specialty catalogs. · Look inside your shoes every day for things like gravel or torn linings, which could cause blisters or sores. · Buy shoes that fit well: 
¨ Look for shoes that have plenty of space around the toes. This helps prevent bunions and blisters. ¨ Try on shoes while wearing the kind of socks you will usually wear with the shoes. ¨ Avoid plastic shoes. They may rub your feet and cause blisters. Good shoes should be made of materials that are flexible and breathable, such as leather or cloth. ¨ Break in new shoes slowly by wearing them for no more than an hour a day for several days. Take extra time to check your feet for red areas, blisters, or other problems after you wear new shoes. · Do not go barefoot. Do not wear sandals, and do not wear shoes with very thin soles. Thin soles are easy to puncture. They also do not protect your feet from hot pavement or cold weather. · Have your doctor check your feet during each visit. If you have a foot problem, see your doctor. Do not try to treat an early foot problem at home. Home remedies or treatments that you can buy without a prescription (such as corn removers) can be harmful. · Always get early treatment for foot problems. A minor irritation can lead to a major problem if not properly cared for early. When should you call for help? Call your doctor now or seek immediate medical care if: 
? · You have a foot sore, an ulcer or break in the skin that is not healing after 4 days, bleeding corns or calluses, or an ingrown toenail. ? · You have blue or black areas, which can mean bruising or blood flow problems. ? · You have peeling skin or tiny blisters between your toes or cracking or oozing of the skin. ? · You have a fever for more than 24 hours and a foot sore. ? · You have new numbness or tingling in your feet that does not go away after you move your feet or change positions. ? · You have unexplained or unusual swelling of the foot or ankle. ?Watch closely for changes in your health, and be sure to contact your doctor if: 
? · You cannot do proper foot care. Where can you learn more? Go to http://laney-leidy.info/. Enter A739 in the search box to learn more about \"Diabetes Foot Health: Care Instructions. \" Current as of: March 13, 2017 Content Version: 11.4 © 4428-2590 [employer]. Care instructions adapted under license by [employer] (which disclaims liability or warranty for this information). If you have questions about a medical condition or this instruction, always ask your healthcare professional. [place] [address] any warranty or liability for your use of this information. Introducing [address]! Dear Cristóbal Templeton: 
Thank you for requesting a [employer] account. Our records indicate that you already have an active [employer] account. You can access your account anytime at [email or web address] Did you know that you can access your hospital and ER discharge instructions at any time in [employer]? You can also review all of your test results from your hospital stay or ER visit. Additional Information If you have questions, please visit the Frequently Asked Questions section of the [employer] website at [email or web address] Remember, [employer] is NOT to be used for urgent needs. For medical emergencies, dial 911. Now available from your iPhone and Android! Please provide this summary of care documentation to your next provider. Your primary care clinician is listed as Светлана Patel. If you have any questions after today's visit, please call [phone].

## 2018-03-12 NOTE — PROGRESS NOTES
HISTORY OF PRESENT ILLNESS  Elma Muñoz is a 56 y.o. male. HPI  FU chronic med conditions: Obesity, fatigue, GWENDOLYN, bronchitis, DM, HTN. He feels very tired by mid morning. He is not able to use CPAP. Had uvuloplasty surgery for GWENDOLYN a few years ago. Falls asleep at desk! Has tried different sleep masks in past ans unable to use due to claustrophobia. He plans to join [facility] program to help with weight. Ongoing cough since he had FLU in Jan.  Treated for bronchitis in late Jan in ER. Normal CXR. Still wheezing and cough. Phlegm is clear. He continues to smoke but is ready to quit. Review of Systems   Constitutional: Positive for malaise/fatigue. Respiratory: Positive for cough and wheezing. All other systems reviewed and are negative. Visit Vitals    /60 (BP 1 Location: Right arm, BP Patient Position: Sitting)  Comment: manual    Pulse 88    Temp 97.7 °F (36.5 °C) (Oral)    Resp 20    Ht 6' (1.829 m)    Wt 278 lb (126.1 kg)    SpO2 96%    BMI 37.7 kg/m2       Physical Exam   Constitutional: He appears well-developed and well-nourished. Cardiovascular: Normal rate, regular rhythm and normal heart sounds. Pulmonary/Chest: Effort normal and breath sounds normal. No respiratory distress. Neurological: He is alert. Vitals reviewed. ASSESSMENT and PLAN    ICD-10-CM ICD-9-CM    1. Bronchospasm J98.01 519.11 albuterol (PROVENTIL HFA, VENTOLIN HFA, PROAIR HFA) 90 mcg/actuation inhaler   2. Simple chronic bronchitis (HCC) J41.0 491.0 budesonide-formoterol (SYMBICORT) 160-4.5 mcg/actuation HFAA   3. Obstructive sleep apnea G47.33 327.23    4. Sinus congestion R09.81 478.19 fluticasone (FLONASE) 50 mcg/actuation nasal spray   5. Controlled type 2 diabetes mellitus without complication, without long-term current use of insulin (HCC) E11.9 250.00 HEMOGLOBIN A1C WITH EAG   6. Pure hypercholesterolemia E78.00 272.0 LIPID PANEL   7.  Essential hypertension I10 401.9 TSH 3RD GENERATION   8. Encounter for long-term current use of medication Z79.899 V58.69 CBC WITH AUTOMATED DIFF      METABOLIC PANEL, COMPREHENSIVE   9. Vitamin D deficiency E55.9 268.9 VITAMIN D, 25 HYDROXY     Refill Albuterol HFA. Order Symbicort inhaler. Order labs. Work on weight reduction as planned. Get more exercise. We discussed stop use Afrin NS. Switch to Flonase NS. Patient Instructions          Diabetes Foot Health: Care Instructions  Your Care Instructions    When you have diabetes, your feet need extra care and attention. Diabetes can damage the nerve endings and blood vessels in your feet, making you less likely to notice when your feet are injured. Diabetes also limits your body's ability to fight infection and get blood to areas that need it. If you get a minor foot injury, it could become an ulcer or a serious infection. With good foot care, you can prevent most of these problems. Caring for your feet can be quick and easy. Most of the care can be done when you are bathing or getting ready for bed. Follow-up care is a key part of your treatment and safety. Be sure to make and go to all appointments, and call your doctor if you are having problems. It's also a good idea to know your test results and keep a list of the medicines you take. How can you care for yourself at home? · Keep your blood sugar close to normal by watching what and how much you eat, monitoring blood sugar, taking medicines if prescribed, and getting regular exercise. · Do not smoke. Smoking affects blood flow and can make foot problems worse. If you need help quitting, talk to your doctor about stop-smoking programs and medicines. These can increase your chances of quitting for good. · Eat a diet that is low in fats. High fat intake can cause fat to build up in your blood vessels and decrease blood flow. · Inspect your feet daily for blisters, cuts, cracks, or sores.  If you cannot see well, use a mirror or have someone help you. · Take care of your feet:  [facility] your feet every day. Use warm (not hot) water. Check the water temperature with your wrists or other part of your body, not your feet. ¨ Dry your feet well. Pat them dry. Do not rub the skin on your feet too hard. Dry well between your toes. If the skin on your feet stays moist, bacteria or a fungus can grow, which can lead to infection. ¨ Keep your skin soft. Use moisturizing skin cream to keep the skin on your feet soft and prevent calluses and cracks. But do not put the cream between your toes, and stop using any cream that causes a rash. ¨ Clean underneath your toenails carefully. Do not use a sharp object to clean underneath your toenails. Use the blunt end of a nail file or other rounded tool. ¨ Trim and file your toenails straight across to prevent ingrown toenails. Use a nail clipper, not scissors. Use an emery board to smooth the edges. · Change socks daily. Socks without seams are best, because seams often rub the feet. You can find socks for people with diabetes from specialty catalogs. · Look inside your shoes every day for things like gravel or torn linings, which could cause blisters or sores. · Buy shoes that fit well:  ¨ Look for shoes that have plenty of space around the toes. This helps prevent bunions and blisters. ¨ Try on shoes while wearing the kind of socks you will usually wear with the shoes. ¨ Avoid plastic shoes. They may rub your feet and cause blisters. Good shoes should be made of materials that are flexible and breathable, such as leather or cloth. ¨ Break in new shoes slowly by wearing them for no more than an hour a day for several days. Take extra time to check your feet for red areas, blisters, or other problems after you wear new shoes. · Do not go barefoot. Do not wear sandals, and do not wear shoes with very thin soles. Thin soles are easy to puncture.  They also do not protect your feet from hot pavement or cold weather. · Have your doctor check your feet during each visit. If you have a foot problem, see your doctor. Do not try to treat an early foot problem at home. Home remedies or treatments that you can buy without a prescription (such as corn removers) can be harmful. · Always get early treatment for foot problems. A minor irritation can lead to a major problem if not properly cared for early. When should you call for help? Call your doctor now or seek immediate medical care if:  ? · You have a foot sore, an ulcer or break in the skin that is not healing after 4 days, bleeding corns or calluses, or an ingrown toenail. ? · You have blue or black areas, which can mean bruising or blood flow problems. ? · You have peeling skin or tiny blisters between your toes or cracking or oozing of the skin. ? · You have a fever for more than 24 hours and a foot sore. ? · You have new numbness or tingling in your feet that does not go away after you move your feet or change positions. ? · You have unexplained or unusual swelling of the foot or ankle. ? Watch closely for changes in your health, and be sure to contact your doctor if:  ? · You cannot do proper foot care. Where can you learn more? Go to http://laney-leidy.info/. Enter A739 in the search box to learn more about \"Diabetes Foot Health: Care Instructions. \"  Current as of: March 13, 2017  Content Version: 11.4  © 6358-3970 [employer]. Care instructions adapted under license by [employer] (which disclaims liability or warranty for this information). If you have questions about a medical condition or this instruction, always ask your healthcare professional. [place] [address] any warranty or liability for your use of this information.            Body Mass Index: Care Instructions  Your Care Instructions    Body mass index (BMI) can help you see if your weight is raising your risk for health problems. It uses a formula to compare how much you weigh with how tall you are. · A BMI lower than 18.5 is considered underweight. · A BMI between 18.5 and 24.9 is considered healthy. · A BMI between 25 and 29.9 is considered overweight. A BMI of 30 or higher is considered obese. If your BMI is in the normal range, it means that you have a lower risk for weight-related health problems. If your BMI is in the overweight or obese range, you may be at increased risk for weight-related health problems, such as high blood pressure, heart disease, stroke, arthritis or joint pain, and diabetes. If your BMI is in the underweight range, you may be at increased risk for health problems such as fatigue, lower protection (immunity) against illness, muscle loss, bone loss, hair loss, and hormone problems. BMI is just one measure of your risk for weight-related health problems. You may be at higher risk for health problems if you are not active, you eat an unhealthy diet, or you drink too much alcohol or use tobacco products. Follow-up care is a key part of your treatment and safety. Be sure to make and go to all appointments, and call your doctor if you are having problems. It's also a good idea to know your test results and keep a list of the medicines you take. How can you care for yourself at home? · Practice healthy eating habits. This includes eating plenty of fruits, vegetables, whole grains, lean protein, and low-fat dairy. · If your doctor recommends it, get more exercise. Walking is a good choice. Bit by bit, increase the amount you walk every day. Try for at least 30 minutes on most days of the week. · Do not smoke. Smoking can increase your risk for health problems. If you need help quitting, talk to your doctor about stop-smoking programs and medicines. These can increase your chances of quitting for good. · Limit alcohol to 2 drinks a day for men and 1 drink a day for women.  Too much alcohol can cause health problems. If you have a BMI higher than 25  · Your doctor may do other tests to check your risk for weight-related health problems. This may include measuring the distance around your waist. A waist measurement of more than 40 inches in men or 35 inches in women can increase the risk of weight-related health problems. · Talk with your doctor about steps you can take to stay healthy or improve your health. You may need to make lifestyle changes to lose weight and stay healthy, such as changing your diet and getting regular exercise. If you have a BMI lower than 18.5  · Your doctor may do other tests to check your risk for health problems. · Talk with your doctor about steps you can take to stay healthy or improve your health. You may need to make lifestyle changes to gain or maintain weight and stay healthy, such as getting more healthy foods in your diet and doing exercises to build muscle. Where can you learn more? Go to http://laney-leidy.info/. Enter S176 in the search box to learn more about \"Body Mass Index: Care Instructions. \"  Current as of: October 13, 2016  Content Version: 11.4  © 8572-6122 [employer]. Care instructions adapted under license by [employer] (which disclaims liability or warranty for this information). If you have questions about a medical condition or this instruction, always ask your healthcare professional. [address] any warranty or liability for your use of this information.

## 2018-03-12 NOTE — PROGRESS NOTES
Identified pt with two pt identifiers(name and ). Chief Complaint   Patient presents with    Lethargy     lack of energy - he is falling a sleep at work and his boss caught him several times    Sleep Apnea     has sleep apnea but can not wear the mask    Neck Pain     neck fused in Oct 17    Flu     he had the flu on 18   [facility] Follow Up     ED 18 - bronchitis - still has cough    Diabetes      after coffee with flavored creamer        Health Maintenance Due   Topic    FOOT EXAM Q1     Pneumococcal 19-64 Medium Risk (1 of 1 - PPSV23)    DTaP/Tdap/Td series (1 - Tdap)    EYE EXAM RETINAL OR DILATED Q1        Wt Readings from Last 3 Encounters:   18 278 lb (126.1 kg)   18 273 lb 9.5 oz (124.1 kg)   18 266 lb 9.6 oz (120.9 kg)     Temp Readings from Last 3 Encounters:   18 97.7 °F (36.5 °C) (Oral)   18 99 °F (37.2 °C)   18 98.4 °F (36.9 °C) (Oral)     BP Readings from Last 3 Encounters:   18 138/60   18 (!) 141/95   18 124/86     Pulse Readings from Last 3 Encounters:   18 88   18 89   18 (!) 110         Learning Assessment:  :     Learning Assessment 2017   PRIMARY LEARNER Patient   BARRIERS PRIMARY LEARNER NONE   CO-LEARNER CAREGIVER No   PRIMARY LANGUAGE ENGLISH   LEARNER PREFERENCE PRIMARY DEMONSTRATION     LISTENING   ANSWERED BY patient   RELATIONSHIP SELF       Depression Screening:  :     PHQ over the last two weeks 3/12/2018   Little interest or pleasure in doing things Not at all   Feeling down, depressed or hopeless Not at all   Total Score PHQ 2 0       Fall Risk Assessment:  :     Fall Risk Assessment, last 12 mths 3/12/2018   Able to walk? Yes   Fall in past 12 months? No       Abuse Screening:  :     Abuse Screening Questionnaire 3/12/2018   Do you ever feel afraid of your partner? N   Are you in a relationship with someone who physically or mentally threatens you?  N   Is it safe for you to go home? Y       Coordination of Care Questionnaire:  :     1) Have you been to an emergency room, urgent care clinic since your last visit? yes [facility]/29[facility] - bronchitis   Hospitalized since your last visit? no             2) Have you seen or consulted any other health care providers outside of [facility] since your last visit? no  (Include any pap smears or colon screenings in this section.)    3) Do you have an Advance Directive on file? no  Are you interested in receiving information about Advance Directives? Yes paper work given and explained. Reviewed record in preparation for visit and have obtained necessary documentation. Medication reconciliation up to date and corrected with patient at this time.

## 2018-03-17 ENCOUNTER — TELEPHONE (OUTPATIENT)
Dept: FAMILY MEDICINE CLINIC | Age: 58
End: 2018-03-17

## 2018-03-17 DIAGNOSIS — E55.9 VITAMIN D DEFICIENCY: ICD-10-CM

## 2018-03-17 DIAGNOSIS — E11.9 CONTROLLED TYPE 2 DIABETES MELLITUS WITHOUT COMPLICATION, WITHOUT LONG-TERM CURRENT USE OF INSULIN (HCC): Primary | ICD-10-CM

## 2018-03-17 LAB
25(OH)D3+25(OH)D2 SERPL-MCNC: 10.8 NG/ML (ref 30–100)
ALBUMIN SERPL-MCNC: 4.5 G/DL (ref 3.5–5.5)
ALBUMIN/GLOB SERPL: 2.1 {RATIO} (ref 1.2–2.2)
ALP SERPL-CCNC: 59 IU/L (ref 39–117)
ALT SERPL-CCNC: 59 IU/L (ref 0–44)
AST SERPL-CCNC: 34 IU/L (ref 0–40)
BASOPHILS # BLD AUTO: 0.1 X10E3/UL (ref 0–0.2)
BASOPHILS NFR BLD AUTO: 1 %
BILIRUB SERPL-MCNC: 0.6 MG/DL (ref 0–1.2)
BUN SERPL-MCNC: 18 MG/DL (ref 6–24)
BUN/CREAT SERPL: 19 (ref 9–20)
CALCIUM SERPL-MCNC: 9.7 MG/DL (ref 8.7–10.2)
CHLORIDE SERPL-SCNC: 99 MMOL/L (ref 96–106)
CHOLEST SERPL-MCNC: 173 MG/DL (ref 100–199)
CO2 SERPL-SCNC: 25 MMOL/L (ref 18–29)
CREAT SERPL-MCNC: 0.97 MG/DL (ref 0.76–1.27)
EOSINOPHIL # BLD AUTO: 0.1 X10E3/UL (ref 0–0.4)
EOSINOPHIL NFR BLD AUTO: 1 %
ERYTHROCYTE [DISTWIDTH] IN BLOOD BY AUTOMATED COUNT: 14.2 % (ref 12.3–15.4)
EST. AVERAGE GLUCOSE BLD GHB EST-MCNC: 169 MG/DL
GFR SERPLBLD CREATININE-BSD FMLA CKD-EPI: 101 ML/MIN/1.73
GFR SERPLBLD CREATININE-BSD FMLA CKD-EPI: 88 ML/MIN/1.73
GLOBULIN SER CALC-MCNC: 2.1 G/DL (ref 1.5–4.5)
GLUCOSE SERPL-MCNC: 136 MG/DL (ref 65–99)
HBA1C MFR BLD: 7.5 % (ref 4.8–5.6)
HCT VFR BLD AUTO: 47.3 % (ref 37.5–51)
HDLC SERPL-MCNC: 34 MG/DL
HGB BLD-MCNC: 15.9 G/DL (ref 13–17.7)
IMM GRANULOCYTES # BLD: 0.1 X10E3/UL (ref 0–0.1)
IMM GRANULOCYTES NFR BLD: 1 %
INTERPRETATION, 910389: NORMAL
LDLC SERPL CALC-MCNC: 87 MG/DL (ref 0–99)
LYMPHOCYTES # BLD AUTO: 3.4 X10E3/UL (ref 0.7–3.1)
LYMPHOCYTES NFR BLD AUTO: 32 %
Lab: NORMAL
MCH RBC QN AUTO: 30.8 PG (ref 26.6–33)
MCHC RBC AUTO-ENTMCNC: 33.6 G/DL (ref 31.5–35.7)
MCV RBC AUTO: 92 FL (ref 79–97)
MONOCYTES # BLD AUTO: 1 X10E3/UL (ref 0.1–0.9)
MONOCYTES NFR BLD AUTO: 10 %
NEUTROPHILS # BLD AUTO: 6 X10E3/UL (ref 1.4–7)
NEUTROPHILS NFR BLD AUTO: 55 %
PLATELET # BLD AUTO: 258 X10E3/UL (ref 150–379)
POTASSIUM SERPL-SCNC: 4.7 MMOL/L (ref 3.5–5.2)
PROT SERPL-MCNC: 6.6 G/DL (ref 6–8.5)
RBC # BLD AUTO: 5.17 X10E6/UL (ref 4.14–5.8)
SODIUM SERPL-SCNC: 141 MMOL/L (ref 134–144)
TRIGL SERPL-MCNC: 259 MG/DL (ref 0–149)
TSH SERPL DL<=0.005 MIU/L-ACNC: 1.78 UIU/ML (ref 0.45–4.5)
VLDLC SERPL CALC-MCNC: 52 MG/DL (ref 5–40)
WBC # BLD AUTO: 10.6 X10E3/UL (ref 3.4–10.8)

## 2018-03-17 RX ORDER — METFORMIN HYDROCHLORIDE EXTENDED-RELEASE TABLETS 1000 MG/1
1000 TABLET, FILM COATED, EXTENDED RELEASE ORAL
Qty: 180 TAB | Refills: 1 | Status: SHIPPED | OUTPATIENT
Start: 2018-03-17 | End: 2018-03-19 | Stop reason: ALTCHOICE

## 2018-03-17 RX ORDER — ERGOCALCIFEROL 1.25 MG/1
50000 CAPSULE ORAL
Qty: 15 CAP | Refills: 3 | Status: SHIPPED | OUTPATIENT
Start: 2018-03-17 | End: 2021-09-16 | Stop reason: ALTCHOICE

## 2018-03-17 NOTE — PROGRESS NOTES
Labs show high sugar and A1C level. Need to increase Metformin dose to 1,000 mg twice a day. I will send new RX. Vit D level is very low. Recommend high dose Vit D supplement 50,000 units 1 cap a week. I will send this RX also. Triglycerides remain elevated. Make sure to cont taking Crestor and Tricor daily. Plan to recheck labs in 3-4 months. Work on diet and weight loss.

## 2018-03-19 ENCOUNTER — PATIENT MESSAGE (OUTPATIENT)
Dept: FAMILY MEDICINE CLINIC | Age: 58
End: 2018-03-19

## 2018-03-19 DIAGNOSIS — E11.9 CONTROLLED TYPE 2 DIABETES MELLITUS WITHOUT COMPLICATION, WITHOUT LONG-TERM CURRENT USE OF INSULIN (HCC): Primary | ICD-10-CM

## 2018-03-19 RX ORDER — METFORMIN HYDROCHLORIDE 500 MG/1
1000 TABLET, EXTENDED RELEASE ORAL
Qty: 120 TAB | Refills: 5 | Status: SHIPPED | OUTPATIENT
Start: 2018-03-19 | End: 2018-03-27 | Stop reason: SDUPTHER

## 2018-03-27 DIAGNOSIS — E78.1 HYPERTRIGLYCERIDEMIA: ICD-10-CM

## 2018-03-27 DIAGNOSIS — E11.9 CONTROLLED TYPE 2 DIABETES MELLITUS WITHOUT COMPLICATION, WITHOUT LONG-TERM CURRENT USE OF INSULIN (HCC): ICD-10-CM

## 2018-03-27 RX ORDER — METFORMIN HYDROCHLORIDE 750 MG/1
TABLET, EXTENDED RELEASE ORAL
Qty: 180 TAB | Refills: 1 | OUTPATIENT
Start: 2018-03-27

## 2018-03-27 RX ORDER — METFORMIN HYDROCHLORIDE 500 MG/1
1000 TABLET, EXTENDED RELEASE ORAL
Qty: 360 TAB | Refills: 1 | Status: SHIPPED | COMMUNITY
Start: 2018-03-27 | End: 2020-09-01 | Stop reason: SDUPTHER

## 2018-03-27 RX ORDER — FENOFIBRATE 145 MG/1
TABLET, COATED ORAL
Qty: 90 TAB | Refills: 1 | Status: SHIPPED | OUTPATIENT
Start: 2018-03-27 | End: 2018-09-23 | Stop reason: SDUPTHER

## 2018-05-14 RX ORDER — VALSARTAN 160 MG/1
TABLET ORAL
Qty: 90 TAB | Refills: 1 | Status: SHIPPED | OUTPATIENT
Start: 2018-05-14 | End: 2018-08-14 | Stop reason: ALTCHOICE

## 2018-08-14 ENCOUNTER — TELEPHONE (OUTPATIENT)
Dept: FAMILY MEDICINE CLINIC | Age: 58
End: 2018-08-14

## 2018-08-14 DIAGNOSIS — I10 ESSENTIAL HYPERTENSION: Primary | ICD-10-CM

## 2018-08-14 RX ORDER — LOSARTAN POTASSIUM 100 MG/1
100 TABLET ORAL DAILY
Qty: 90 TAB | Refills: 1 | Status: SHIPPED | OUTPATIENT
Start: 2018-08-14 | End: 2019-01-16 | Stop reason: ALTCHOICE

## 2018-08-14 NOTE — TELEPHONE ENCOUNTER
Pt was advised and verbalized understanding. He reports he has been seeing the [address] [facility] for weight loss and they have been doing his labs. He has lab f/u with them in 2 wks and will have them fax a copy to our office for review. He reports that his labs have been looking better each time he has them done through them as he has been losing weight.

## 2018-08-14 NOTE — TELEPHONE ENCOUNTER
Please call patient and let him know that due to recent recall of valsartan, we need to change his BP medication. He should stop the valsartan and start the losartan that I just sent to [facility]. Also, he is due for follow up in office with fasting labs now. Thanks!

## 2018-09-24 RX ORDER — ROSUVASTATIN CALCIUM 10 MG/1
TABLET, COATED ORAL
Qty: 90 TAB | Refills: 0 | Status: SHIPPED | OUTPATIENT
Start: 2018-09-24 | End: 2018-12-22 | Stop reason: SDUPTHER

## 2018-12-22 DIAGNOSIS — E78.1 HYPERTRIGLYCERIDEMIA: ICD-10-CM

## 2018-12-23 RX ORDER — FENOFIBRATE 145 MG/1
TABLET, COATED ORAL
Qty: 90 TAB | Refills: 0 | Status: SHIPPED | OUTPATIENT
Start: 2018-12-23 | End: 2019-03-22 | Stop reason: SDUPTHER

## 2018-12-23 RX ORDER — ROSUVASTATIN CALCIUM 10 MG/1
TABLET, COATED ORAL
Qty: 90 TAB | Refills: 0 | Status: SHIPPED | OUTPATIENT
Start: 2018-12-23 | End: 2019-03-23 | Stop reason: SDUPTHER

## 2019-01-15 ENCOUNTER — TELEPHONE (OUTPATIENT)
Dept: FAMILY MEDICINE CLINIC | Age: 59
End: 2019-01-15

## 2019-01-15 NOTE — TELEPHONE ENCOUNTER
Received notice from [employer] that Pt's Losartan was in the lot of medication that was recalled. He is due for office appointment as well, so should make an office appointment with fasting labs, to discuss changing medication. Please call and let him know this.   Thanks

## 2019-01-16 ENCOUNTER — OFFICE VISIT (OUTPATIENT)
Dept: FAMILY MEDICINE CLINIC | Age: 59
End: 2019-01-16

## 2019-01-16 VITALS
WEIGHT: 242 LBS | BODY MASS INDEX: 32.78 KG/M2 | OXYGEN SATURATION: 97 % | RESPIRATION RATE: 16 BRPM | TEMPERATURE: 98.2 F | HEIGHT: 72 IN | HEART RATE: 77 BPM | DIASTOLIC BLOOD PRESSURE: 78 MMHG | SYSTOLIC BLOOD PRESSURE: 123 MMHG

## 2019-01-16 DIAGNOSIS — Z23 NEED FOR STREPTOCOCCUS PNEUMONIAE VACCINATION: ICD-10-CM

## 2019-01-16 DIAGNOSIS — Z23 NEED FOR SHINGLES VACCINE: ICD-10-CM

## 2019-01-16 DIAGNOSIS — Z23 NEED FOR TDAP VACCINATION: ICD-10-CM

## 2019-01-16 DIAGNOSIS — I10 ESSENTIAL HYPERTENSION: ICD-10-CM

## 2019-01-16 DIAGNOSIS — E11.9 CONTROLLED TYPE 2 DIABETES MELLITUS WITHOUT COMPLICATION, WITHOUT LONG-TERM CURRENT USE OF INSULIN (HCC): Primary | ICD-10-CM

## 2019-01-16 DIAGNOSIS — E78.00 PURE HYPERCHOLESTEROLEMIA: ICD-10-CM

## 2019-01-16 DIAGNOSIS — Z12.11 SCREENING FOR COLON CANCER: ICD-10-CM

## 2019-01-16 LAB
ALBUMIN UR QL STRIP: 10 MG/L
CREATININE, URINE POC: 300 MG/DL
MICROALBUMIN/CREAT RATIO POC: <30 MG/G

## 2019-01-16 RX ORDER — VALSARTAN 160 MG/1
160 TABLET ORAL DAILY
Qty: 90 TAB | Refills: 1 | Status: SHIPPED | OUTPATIENT
Start: 2019-01-16 | End: 2019-04-03 | Stop reason: ALTCHOICE

## 2019-01-16 NOTE — PROGRESS NOTES
Identified pt with two pt identifiers(name and ). Chief Complaint   Patient presents with    Cholesterol Problem    Hypertension    Diabetes    Labs     Has only had black coffee since midnight    Medication Evaluation     change BP medication that has been recalled by     Weight Loss     Has been doing [facility]        Health Maintenance Due   Topic    FOOT EXAM Q1     Pneumococcal 19-64 Medium Risk (1 of 1 - PPSV23)    DTaP/Tdap/Td series (1 - Tdap)    Shingrix Vaccine Age 50> (1 of 2)    HEMOGLOBIN A1C Q6M     MICROALBUMIN Q1     FOBT Q 1 YEAR AGE 50-75        Wt Readings from Last 3 Encounters:   19 242 lb (109.8 kg)   18 278 lb (126.1 kg)   18 273 lb 9.5 oz (124.1 kg)     Temp Readings from Last 3 Encounters:   19 98.2 °F (36.8 °C) (Oral)   18 97.7 °F (36.5 °C) (Oral)   18 99 °F (37.2 °C)     BP Readings from Last 3 Encounters:   19 123/78   18 138/60   18 (!) 141/95     Pulse Readings from Last 3 Encounters:   19 77   18 88   18 89         Learning Assessment:  :     Learning Assessment 2017   PRIMARY LEARNER Patient   BARRIERS PRIMARY LEARNER NONE   CO-LEARNER CAREGIVER No   PRIMARY LANGUAGE ENGLISH   LEARNER PREFERENCE PRIMARY DEMONSTRATION     LISTENING   ANSWERED BY patient   RELATIONSHIP SELF       Depression Screening:  :     PHQ over the last two weeks 3/12/2018   Little interest or pleasure in doing things Not at all   Feeling down, depressed, irritable, or hopeless Not at all   Total Score PHQ 2 0       Fall Risk Assessment:  :     Fall Risk Assessment, last 12 mths 3/12/2018   Able to walk? Yes   Fall in past 12 months? No       Abuse Screening:  :     Abuse Screening Questionnaire 3/12/2018   Do you ever feel afraid of your partner? N   Are you in a relationship with someone who physically or mentally threatens you? N   Is it safe for you to go home?  Y       Coordination of Care Questionnaire:  :     1) Have you been to an emergency room, urgent care clinic since your last visit? no   Hospitalized since your last visit? no             2) Have you seen or consulted any other health care providers outside of [address] [place] [address] since your last visit? yes, has seen the eye doctor and also [facility]. (Include any pap smears or colon screenings in this section.)    3) Do you have an Advance Directive on file? no  Are you interested in receiving information about Advance Directives? no    Patient is accompanied by self. Reviewed record in preparation for visit and have obtained necessary documentation. Medication reconciliation up to date and corrected with patient at this time.

## 2019-01-16 NOTE — PROGRESS NOTES
Subjective:     Coby Bhakta is a 66 y.o. male who presents for follow up of diabetes, hypertension and hyperlipidemia. Diet and Lifestyle: generally follows a low fat low cholesterol diet  Home BP Monitoring: is not measured at home    Cardiovascular ROS: taking medications as instructed, no medication side effects noted, no TIA's, no chest pain on exertion, no dyspnea on exertion, no swelling of ankles. New concerns: Pt has no concerns at this time. We are needing to change his losartan, due to his lot being on recall at this time. Pt states that he has never had trouble with any other BP medications. He is fasting today, and will get blood work at this time. Patient Active Problem List    Diagnosis Date Noted    Essential hypertension 03/12/2018    Hypertriglyceridemia 10/16/2017    HNP (herniated nucleus pulposus), cervical 08/28/2017    Neck pain 08/21/2017    Hepatic steatosis 03/02/2017    Hyperlipidemia 03/02/2017    Fatigue 03/02/2017    [facility] 03/02/2017    Insomnia 01/18/2017    Abnormal chest x-ray 01/18/2017    Obstructive sleep apnea 05/09/2013     Current Outpatient Medications   Medication Sig Dispense Refill    valsartan (DIOVAN) 160 mg tablet Take 1 Tab by mouth daily. 90 Tab 1    varicella-zoster recombinant, PF, (SHINGRIX, PF,) 50 mcg/0.5 mL susr injection 0.5 mL by IntraMUSCular route once for 1 dose. 0.5 mL 0    diph,Pertuss,Acell,,Tet Vac-PF (ADACEL) 2 Lf-(2.5-5-3-5 mcg)-5Lf/0.5 mL susp 0.5 mL by IntraMUSCular route once for 1 dose. 1 Syringe 0    pneumococcal 23-valent (PNEUMOVAX 23) 25 mcg/0.5 mL injection 0.5 mL by IntraMUSCular route once for 1 dose.  0.5 mL 0    fenofibrate nanocrystallized (TRICOR) 145 mg tablet TAKE 1 TABLET DAILY (NEED FOLLOW UP VISIT AND LABS) 90 Tab 0    rosuvastatin (CRESTOR) 10 mg tablet TAKE 1 TABLET DAILY (NEED OFFICE VISIT AND LABS) 90 Tab 0    metFORMIN ER (GLUCOPHAGE XR) 500 mg tablet Take 2 Tabs by mouth two (2) times daily (after meals). Indications: type 2 diabetes mellitus (Patient taking differently: Take 1,000 mg by mouth two (2) times daily (after meals). Has only been taking one tablet once daily as blood sugar levels have been doing well d/t wgt loss) 360 Tab 1    glucose blood VI test strips (ASCENSIA AUTODISC VI, ONE TOUCH ULTRA TEST VI) strip Dispense One Touch Ultra test strips. To check blood sugar once daily. Dx: E11.9 100 Strip 2    Lancets misc Dispense Delica lancets. To test blood sugar once daily. Dx: E11.9 1 Each 11    lansoprazole (PREVACID) 30 mg capsule Take 1 Cap by mouth Daily (before breakfast). 90 Cap 1    ergocalciferol (ERGOCALCIFEROL) 50,000 unit capsule Take 1 Cap by mouth every seven (7) days. Indications: Vitamin D Deficiency 15 Cap 3    albuterol (PROVENTIL HFA, VENTOLIN HFA, PROAIR HFA) 90 mcg/actuation inhaler Take 2 Puffs by inhalation every four (4) hours as needed for Wheezing. 1 Inhaler 0     Allergies   Allergen Reactions    Vioxx [Rofecoxib] Swelling and Other (comments)     SWELLING OF UVULA, DIFFICULTY BREATHING     Past Medical History:   Diagnosis Date    Arthritis     Chronic pain     LOWER BACK , LEFT ARM    Deviated septum     Diabetes (HCC)     BORDERLINE PER PATIENT    GERD (gastroesophageal reflux disease)     High cholesterol     Hypertension     Unspecified sleep apnea 2014    NO C-PAP     Past Surgical History:   Procedure Laterality Date    HX APPENDECTOMY      HX GI      COLONOSCOPY    HX HEENT  2014    Tonsils and uvula removed.     HX OTHER SURGICAL      WISDOM TEETH EXTRACTION    HX OTHER SURGICAL      EXPLORATORY LAP    HX SEPTOPLASTY       Family History   Problem Relation Age of Onset    Heart Disease Mother     Dementia Mother         ALZHEIMER'S    Anesth Problems Maternal Aunt         TROUBLE BREATHING AFTER HIP SURGERY     Social History     Tobacco Use    Smoking status: Current Every Day Smoker Packs/day: 1.50     Years: 21.00     Pack years: 31.50    Smokeless tobacco: Never Used   Substance Use Topics    Alcohol use: Yes     Alcohol/week: 0.6 oz     Types: 1 Shots of liquor per week        Lab Results   Component Value Date/Time    WBC 10.6 03/16/2018 09:41 AM    HGB 15.9 03/16/2018 09:41 AM    HCT 47.3 03/16/2018 09:41 AM    PLATELET 089 22/35/2595 09:41 AM    MCV 92 03/16/2018 09:41 AM     Lab Results   Component Value Date/Time    Hemoglobin A1c 7.5 (H) 03/16/2018 09:41 AM    Hemoglobin A1c 7.5 (H) 10/13/2017 10:45 AM    Hemoglobin A1c 7.0 (H) 08/14/2017 10:29 AM    Glucose 136 (H) 03/16/2018 09:41 AM    Glucose (POC) 240 (H) 08/29/2017 11:49 AM    LDL, calculated 87 03/16/2018 09:41 AM    Creatinine 0.97 03/16/2018 09:41 AM      Lab Results   Component Value Date/Time    Cholesterol, total 173 03/16/2018 09:41 AM    HDL Cholesterol 34 (L) 03/16/2018 09:41 AM    LDL, calculated 87 03/16/2018 09:41 AM    Triglyceride 259 (H) 03/16/2018 09:41 AM     Lab Results   Component Value Date/Time    GFR est non-AA 88 03/16/2018 09:41 AM    GFR est  03/16/2018 09:41 AM    Creatinine 0.97 03/16/2018 09:41 AM    BUN 18 03/16/2018 09:41 AM    Sodium 141 03/16/2018 09:41 AM    Potassium 4.7 03/16/2018 09:41 AM    Chloride 99 03/16/2018 09:41 AM    CO2 25 03/16/2018 09:41 AM        Review of Systems, additional:  Pertinent items are noted in HPI. Objective:     Visit Vitals  /78 (BP 1 Location: Right arm, BP Patient Position: Sitting)   Pulse 77   Temp 98.2 °F (36.8 °C) (Oral)   Resp 16   Ht 6' (1.829 m)   Wt 242 lb (109.8 kg)   SpO2 97%   BMI 32.82 kg/m²     Appearance: alert, well appearing, and in no distress. General exam: CVS exam BP noted to be well controlled today in office, S1, S2 normal, no gallop, no murmur, chest clear, no JVD, no HSM, no edema, peripheral vascular exam both carotids normal upstroke without bruits.   Lab review: orders written for new lab studies as appropriate; see orders. Assessment/Plan:     hypertension stable, hyperlipidemia stable. orders and follow up as documented in patient record  reviewed diet, exercise and weight control. ICD-10-CM ICD-9-CM    1. Controlled type 2 diabetes mellitus without complication, without long-term current use of insulin (HCC) E11.9 250.00 AMB POC URINE, MICROALBUMIN, SEMIQUANT (3 RESULTS)      HEMOGLOBIN A1C WITH EAG   2. Essential hypertension I10 401.9 valsartan (DIOVAN) 160 mg tablet   3. Pure hypercholesterolemia E78.00 272.0 CBC W/O DIFF      METABOLIC PANEL, COMPREHENSIVE      LIPID PANEL   4. Screening for colon cancer Z12.11 V76.51 OCCULT BLOOD IMMUNOASSAY,DIAGNOSTIC   5. Need for shingles vaccine Z23 V04.89 varicella-zoster recombinant, PF, (SHINGRIX, PF,) 50 mcg/0.5 mL susr injection   6. Need for Tdap vaccination Z23 V06.1 diph,Pertuss,Acell,,Tet Vac-PF (ADACEL) 2 Lf-(2.5-5-3-5 mcg)-5Lf/0.5 mL susp   7. Need for Streptococcus pneumoniae vaccination Z23 V03.82 pneumococcal 23-valent (PNEUMOVAX 23) 25 mcg/0.5 mL injection     HISTORY OF PRESENT ILLNESS  Gabriela Holden is a 66 y.o. male. HPI    ROS    Physical Exam    ASSESSMENT and PLAN    ICD-10-CM ICD-9-CM    1. Controlled type 2 diabetes mellitus without complication, without long-term current use of insulin (HCC) E11.9 250.00 AMB POC URINE, MICROALBUMIN, SEMIQUANT (3 RESULTS)      HEMOGLOBIN A1C WITH EAG   2. Essential hypertension I10 401.9 valsartan (DIOVAN) 160 mg tablet   3. Pure hypercholesterolemia E78.00 272.0 CBC W/O DIFF      METABOLIC PANEL, COMPREHENSIVE      LIPID PANEL   4. Screening for colon cancer Z12.11 V76.51 OCCULT BLOOD IMMUNOASSAY,DIAGNOSTIC   5. Need for shingles vaccine Z23 V04.89 varicella-zoster recombinant, PF, (SHINGRIX, PF,) 50 mcg/0.5 mL susr injection   6. Need for Tdap vaccination Z23 V06.1 diph,Pertuss,Acell,,Tet Vac-PF (ADACEL) 2 Lf-(2.5-5-3-5 mcg)-5Lf/0.5 mL susp   7.  Need for Streptococcus pneumoniae vaccination Z23 V03.82 pneumococcal 23-valent (PNEUMOVAX 23) 25 mcg/0.5 mL injection     Informed patient that we will notify him when his labs return, and inform him of any change in plan of care at that time. Educated about stopping Losartan and starting Valsartan, which was sent to pharmacy. Educated about getting vaccines that were sent to the pharmacy. Pt informed to return to office with worsening of symptoms, or PRN with any questions or concerns. Pt verbalizes understanding of plan of care and denies further questions or concerns at this time.

## 2019-01-16 NOTE — PATIENT INSTRUCTIONS
Learning About ARBs  Introduction    ARBs (angiotensin II receptor blockers) block a hormone that makes blood vessels narrow. As a result, the blood vessels relax and widen. This lowers blood pressure. ARBs also put more water and salt into the urine. This also lowers blood pressure. ARBs can treat:  · High blood pressure. · Coronary artery disease. · Heart failure. They also may be used to help your kidneys when you have diabetes. Examples  ARBs include:  · Candesartan (Atacand). · Irbesartan (Avapro). · Losartan (Cozaar). This is not a complete list of all ARBs. Possible side effects  Side effects may include:  · Low blood pressure. You may feel dizzy and weak. · Diarrhea. · High potassium levels. You may have other side effects or reactions not listed here. Check the information that comes with your medicine. What to know about taking this medicine  · ARBs may be used if you had a cough when you tried to take an ACE inhibitor. ARBs are less likely to cause a cough. · You may need regular blood tests. · Take your medicines exactly as prescribed. Call your doctor if you think you are having a problem with your medicine. · Tell your doctor or pharmacist all the medicines you take. This includes over-the-counter medicines, vitamins, herbal products, and supplements. Taking some medicines together can cause problems. · You should not take ARBs if you are pregnant or planning to become pregnant. Where can you learn more? Go to http://laney-leidy.info/. Enter K212 in the search box to learn more about \"Learning About ARBs. \"  Current as of: December 6, 2017  Content Version: 11.8  © 4516-5095 [employer]. Care instructions adapted under license by [employer] (which disclaims liability or warranty for this information).  If you have questions about a medical condition or this instruction, always ask your healthcare professional. Zola Ormond disclaims any warranty or liability for your use of this information.

## 2019-01-17 LAB
ALBUMIN SERPL-MCNC: 4.8 G/DL (ref 3.5–5.5)
ALBUMIN/GLOB SERPL: 1.8 {RATIO} (ref 1.2–2.2)
ALP SERPL-CCNC: 45 IU/L (ref 39–117)
ALT SERPL-CCNC: 28 IU/L (ref 0–44)
AST SERPL-CCNC: 24 IU/L (ref 0–40)
BILIRUB SERPL-MCNC: 0.4 MG/DL (ref 0–1.2)
BUN SERPL-MCNC: 15 MG/DL (ref 6–24)
BUN/CREAT SERPL: 14 (ref 9–20)
CALCIUM SERPL-MCNC: 10.1 MG/DL (ref 8.7–10.2)
CHLORIDE SERPL-SCNC: 103 MMOL/L (ref 96–106)
CHOLEST SERPL-MCNC: 177 MG/DL (ref 100–199)
CO2 SERPL-SCNC: 21 MMOL/L (ref 20–29)
CREAT SERPL-MCNC: 1.09 MG/DL (ref 0.76–1.27)
ERYTHROCYTE [DISTWIDTH] IN BLOOD BY AUTOMATED COUNT: 13.7 % (ref 12.3–15.4)
EST. AVERAGE GLUCOSE BLD GHB EST-MCNC: 128 MG/DL
GLOBULIN SER CALC-MCNC: 2.6 G/DL (ref 1.5–4.5)
GLUCOSE SERPL-MCNC: 115 MG/DL (ref 65–99)
HBA1C MFR BLD: 6.1 % (ref 4.8–5.6)
HCT VFR BLD AUTO: 48 % (ref 37.5–51)
HDLC SERPL-MCNC: 38 MG/DL
HGB BLD-MCNC: 15.7 G/DL (ref 13–17.7)
INTERPRETATION, 910389: NORMAL
LDLC SERPL CALC-MCNC: 112 MG/DL (ref 0–99)
Lab: NORMAL
MCH RBC QN AUTO: 30.4 PG (ref 26.6–33)
MCHC RBC AUTO-ENTMCNC: 32.7 G/DL (ref 31.5–35.7)
MCV RBC AUTO: 93 FL (ref 79–97)
PLATELET # BLD AUTO: 307 X10E3/UL (ref 150–379)
POTASSIUM SERPL-SCNC: 4.8 MMOL/L (ref 3.5–5.2)
PROT SERPL-MCNC: 7.4 G/DL (ref 6–8.5)
RBC # BLD AUTO: 5.17 X10E6/UL (ref 4.14–5.8)
SODIUM SERPL-SCNC: 144 MMOL/L (ref 134–144)
TRIGL SERPL-MCNC: 135 MG/DL (ref 0–149)
VLDLC SERPL CALC-MCNC: 27 MG/DL (ref 5–40)
WBC # BLD AUTO: 10.1 X10E3/UL (ref 3.4–10.8)

## 2019-01-17 NOTE — PROGRESS NOTES
Please call patient and let him know that his labs returned:  1, HgbA1C is best it has been in over a year! Keep it up! 2. Cholesterol is better, but LDL continues to need improvement. Focus on mediterranean diet. If not at goal in 6 months, will increase Crestor. Thanks!

## 2019-01-23 LAB — HEMOCCULT STL QL IA: NEGATIVE

## 2019-03-22 DIAGNOSIS — E78.1 HYPERTRIGLYCERIDEMIA: ICD-10-CM

## 2019-03-22 RX ORDER — FENOFIBRATE 145 MG/1
TABLET, COATED ORAL
Qty: 90 TAB | Refills: 0 | Status: SHIPPED | OUTPATIENT
Start: 2019-03-22 | End: 2019-06-20 | Stop reason: SDUPTHER

## 2019-03-23 RX ORDER — ROSUVASTATIN CALCIUM 10 MG/1
TABLET, COATED ORAL
Qty: 90 TAB | Refills: 0 | Status: SHIPPED | OUTPATIENT
Start: 2019-03-23 | End: 2019-06-21 | Stop reason: SDUPTHER

## 2019-04-03 ENCOUNTER — TELEPHONE (OUTPATIENT)
Dept: FAMILY MEDICINE CLINIC | Age: 59
End: 2019-04-03

## 2019-04-03 RX ORDER — CANDESARTAN 16 MG/1
16 TABLET ORAL DAILY
Qty: 90 TAB | Refills: 0 | Status: SHIPPED | OUTPATIENT
Start: 2019-04-03 | End: 2019-04-04 | Stop reason: SDUPTHER

## 2019-04-03 NOTE — TELEPHONE ENCOUNTER
The pt got a call that [facility] did not have this in stock at this time and the pt wants to know if [facility].

## 2019-04-03 NOTE — TELEPHONE ENCOUNTER
Please call patient and let him know that we received notification from pharmacy that his Valsartan was recalled. I have sent in new medication, Candesartan, and he should take this instead of the losartan.   Should return to office in 1 month for BP re-check  Thanks

## 2019-04-04 RX ORDER — CANDESARTAN 16 MG/1
16 TABLET ORAL DAILY
Qty: 90 TAB | Refills: 0 | Status: SHIPPED | OUTPATIENT
Start: 2019-04-04 | End: 2019-06-15 | Stop reason: SDUPTHER

## 2019-06-17 RX ORDER — CANDESARTAN 16 MG/1
TABLET ORAL
Qty: 90 TAB | Refills: 0 | Status: SHIPPED | OUTPATIENT
Start: 2019-06-17 | End: 2021-03-22

## 2019-06-20 DIAGNOSIS — E78.1 HYPERTRIGLYCERIDEMIA: ICD-10-CM

## 2019-06-20 RX ORDER — FENOFIBRATE 145 MG/1
TABLET, COATED ORAL
Qty: 90 TAB | Refills: 0 | Status: SHIPPED | OUTPATIENT
Start: 2019-06-20 | End: 2019-10-21 | Stop reason: SDUPTHER

## 2019-06-21 RX ORDER — ROSUVASTATIN CALCIUM 10 MG/1
TABLET, COATED ORAL
Qty: 90 TAB | Refills: 0 | Status: SHIPPED | OUTPATIENT
Start: 2019-06-21 | End: 2019-10-21 | Stop reason: SDUPTHER

## 2019-09-10 ENCOUNTER — OFFICE VISIT (OUTPATIENT)
Dept: FAMILY MEDICINE CLINIC | Age: 59
End: 2019-09-10

## 2019-09-10 VITALS
OXYGEN SATURATION: 97 % | DIASTOLIC BLOOD PRESSURE: 80 MMHG | RESPIRATION RATE: 20 BRPM | BODY MASS INDEX: 33.86 KG/M2 | HEIGHT: 72 IN | TEMPERATURE: 97.1 F | SYSTOLIC BLOOD PRESSURE: 134 MMHG | HEART RATE: 75 BPM | WEIGHT: 250 LBS

## 2019-09-10 DIAGNOSIS — I10 ESSENTIAL HYPERTENSION: ICD-10-CM

## 2019-09-10 DIAGNOSIS — E11.9 CONTROLLED TYPE 2 DIABETES MELLITUS WITHOUT COMPLICATION, WITHOUT LONG-TERM CURRENT USE OF INSULIN (HCC): ICD-10-CM

## 2019-09-10 DIAGNOSIS — R31.9 HEMATURIA, UNSPECIFIED TYPE: ICD-10-CM

## 2019-09-10 DIAGNOSIS — E78.1 HYPERTRIGLYCERIDEMIA: ICD-10-CM

## 2019-09-10 DIAGNOSIS — M54.42 BILATERAL LOW BACK PAIN WITH BILATERAL SCIATICA, UNSPECIFIED CHRONICITY: Primary | ICD-10-CM

## 2019-09-10 DIAGNOSIS — M54.41 BILATERAL LOW BACK PAIN WITH BILATERAL SCIATICA, UNSPECIFIED CHRONICITY: Primary | ICD-10-CM

## 2019-09-10 LAB
BILIRUB UR QL STRIP: NEGATIVE
GLUCOSE UR-MCNC: NEGATIVE MG/DL
KETONES P FAST UR STRIP-MCNC: NEGATIVE MG/DL
PH UR STRIP: 6 [PH] (ref 4.6–8)
PROT UR QL STRIP: NEGATIVE
SP GR UR STRIP: 1.02 (ref 1–1.03)
UA UROBILINOGEN AMB POC: ABNORMAL (ref 0.2–1)
URINALYSIS CLARITY POC: CLEAR
URINALYSIS COLOR POC: ABNORMAL
URINE BLOOD POC: ABNORMAL
URINE LEUKOCYTES POC: NEGATIVE
URINE NITRITES POC: NEGATIVE

## 2019-09-10 RX ORDER — CYCLOBENZAPRINE HCL 10 MG
10 TABLET ORAL
Qty: 30 TAB | Refills: 0 | Status: SHIPPED | OUTPATIENT
Start: 2019-09-10 | End: 2019-12-16 | Stop reason: ALTCHOICE

## 2019-09-10 RX ORDER — VALSARTAN AND HYDROCHLOROTHIAZIDE 160; 12.5 MG/1; MG/1
1 TABLET, FILM COATED ORAL
COMMUNITY
End: 2021-09-16 | Stop reason: DRUGHIGH

## 2019-09-10 NOTE — PROGRESS NOTES
HISTORY OF PRESENT ILLNESS  Rubi Santos is a 64 y.o. male. HPI   Pt presents with \"moises pain\"  Symptoms started 2 days ago  He is remodeling his kitchen, and believes that he lifted something a little bit too heavy  Low back pain, with radiation down left leg  No numbness in hands or feet  Pain is worse with movement, and sitting  OTC: Naproxen    Pt has long standing history of back pain, and states that the littlest activity can set it off. Review of Systems   Constitutional: Negative for fever. HENT: Negative for congestion. Gastrointestinal: Negative for diarrhea and vomiting. Musculoskeletal: Positive for back pain. Physical Exam   Constitutional: He is oriented to person, place, and time. He appears well-developed and well-nourished. HENT:   Head: Normocephalic and atraumatic. Cardiovascular: Normal rate, regular rhythm and normal heart sounds. Pulmonary/Chest: Effort normal and breath sounds normal.   Musculoskeletal:        Lumbar back: He exhibits pain. He exhibits normal range of motion. Neurological: He is alert and oriented to person, place, and time. Skin: Skin is warm and dry. Psychiatric: He has a normal mood and affect. His behavior is normal.       ASSESSMENT and PLAN    ICD-10-CM ICD-9-CM    1. Bilateral low back pain with bilateral sciatica, unspecified chronicity M54.42 724.3 AMB POC URINALYSIS DIP STICK MANUAL W/O MICRO    M54.41 724.2 cyclobenzaprine (FLEXERIL) 10 mg tablet   2. Hematuria, unspecified type R31.9 599.70 AMB POC URINALYSIS DIP STICK MANUAL W/O MICRO   3. Essential hypertension I10 401.9    4. Controlled type 2 diabetes mellitus without complication, without long-term current use of insulin (HCC) E11.9 250.00 CBC W/O DIFF      METABOLIC PANEL, COMPREHENSIVE      HEMOGLOBIN A1C WITH EAG   5.  Hypertriglyceridemia E78.1 272.1 LIPID PANEL     Informed patient that he should take medication as prescribed  Should notify office should symptoms continue    Pt informed to return to office with worsening of symptoms, or PRN with any questions or concerns. Pt verbalizes understanding of plan of care and denies further questions or concerns at this time.

## 2019-09-10 NOTE — PROGRESS NOTES
Identified pt with two pt identifiers(name and ). Chief Complaint   Patient presents with    LOW BACK PAIN     sharp \"like a knife sticking him in the back\"        Health Maintenance Due   Topic    FOOT EXAM Q1     Shingrix Vaccine Age 50> (1 of 2)    HEMOGLOBIN A1C Q6M     Influenza Age 5 to Adult        Wt Readings from Last 3 Encounters:   09/10/19 250 lb (113.4 kg)   19 242 lb (109.8 kg)   18 278 lb (126.1 kg)     Temp Readings from Last 3 Encounters:   09/10/19 97.1 °F (36.2 °C) (Oral)   19 98.2 °F (36.8 °C) (Oral)   18 97.7 °F (36.5 °C) (Oral)     BP Readings from Last 3 Encounters:   09/10/19 134/80   19 123/78   18 138/60     Pulse Readings from Last 3 Encounters:   09/10/19 75   19 77   18 88         Learning Assessment:  :     Learning Assessment 2017   PRIMARY LEARNER Patient   BARRIERS PRIMARY LEARNER NONE   CO-LEARNER CAREGIVER No   PRIMARY LANGUAGE ENGLISH   LEARNER PREFERENCE PRIMARY DEMONSTRATION     LISTENING   ANSWERED BY patient   RELATIONSHIP SELF       Depression Screening:  :     3 most recent PHQ Screens 9/10/2019   Little interest or pleasure in doing things Not at all   Feeling down, depressed, irritable, or hopeless Not at all   Total Score PHQ 2 0       Fall Risk Assessment:  :     Fall Risk Assessment, last 12 mths 3/12/2018   Able to walk? Yes   Fall in past 12 months? No       Abuse Screening:  :     Abuse Screening Questionnaire 9/10/2019 3/12/2018   Do you ever feel afraid of your partner? N N   Are you in a relationship with someone who physically or mentally threatens you? N N   Is it safe for you to go home?  Y Y       Coordination of Care Questionnaire:  :     1) Have you been to an emergency room, urgent care clinic since your last visit? no   Hospitalized since your last visit? no             2) Have you seen or consulted any other health care providers outside of [address] [place] [address] since your last visit? no (Include any pap smears or colon screenings in this section.)    3) Do you have an Advance Directive on file? no  Are you interested in receiving information about Advance Directives? no    Patient is accompanied by self. Reviewed record in preparation for visit and have obtained necessary documentation. Medication reconciliation up to date and corrected with patient at this time. Order for U/A Testing placed per [facility] Jorge's verbal order.

## 2019-09-10 NOTE — PATIENT INSTRUCTIONS
Back Pain: Care Instructions  Your Care Instructions    Back pain has many possible causes. It is often related to problems with muscles and ligaments of the back. It may also be related to problems with the nerves, discs, or bones of the back. Moving, lifting, standing, sitting, or sleeping in an awkward way can strain the back. Sometimes you don't notice the injury until later. Arthritis is another common cause of back pain. Although it may hurt a lot, back pain usually improves on its own within several weeks. Most people recover in 12 weeks or less. Using good home treatment and being careful not to stress your back can help you feel better sooner. Follow-up care is a key part of your treatment and safety. Be sure to make and go to all appointments, and call your doctor if you are having problems. It's also a good idea to know your test results and keep a list of the medicines you take. How can you care for yourself at home? · Sit or lie in positions that are most comfortable and reduce your pain. Try one of these positions when you lie down:  ? Lie on your back with your knees bent and supported by large pillows. ? Lie on the floor with your legs on the seat of a sofa or chair. ? Lie on your side with your knees and hips bent and a pillow between your legs. ? Lie on your stomach if it does not make pain worse. · Do not sit up in bed, and avoid soft couches and twisted positions. Bed rest can help relieve pain at first, but it delays healing. Avoid bed rest after the first day of back pain. · Change positions every 30 minutes. If you must sit for long periods of time, take breaks from sitting. Get up and walk around, or lie in a comfortable position. · Try using a heating pad on a low or medium setting for 15 to 20 minutes every 2 or 3 hours. Try a warm shower in place of one session with the heating pad. · You can also try an ice pack for 10 to 15 minutes every 2 to 3 hours.  Put a thin cloth between the ice pack and your skin. · Take pain medicines exactly as directed. ? If the doctor gave you a prescription medicine for pain, take it as prescribed. ? If you are not taking a prescription pain medicine, ask your doctor if you can take an over-the-counter medicine. · Take short walks several times a day. You can start with 5 to 10 minutes, 3 or 4 times a day, and work up to longer walks. Walk on level surfaces and avoid hills and stairs until your back is better. · Return to work and other activities as soon as you can. Continued rest without activity is usually not good for your back. · To prevent future back pain, do exercises to stretch and strengthen your back and stomach. Learn how to use good posture, safe lifting techniques, and proper body mechanics. When should you call for help? Call your doctor now or seek immediate medical care if:    · You have new or worsening numbness in your legs.     · You have new or worsening weakness in your legs. (This could make it hard to stand up.)     · You lose control of your bladder or bowels.    Watch closely for changes in your health, and be sure to contact your doctor if:    · You have a fever, lose weight, or don't feel well.     · You do not get better as expected. Where can you learn more? Go to http://laney-leidy.info/. Enter E402 in the search box to learn more about \"Back Pain: Care Instructions. \"  Current as of: September 20, 2018  Content Version: 12.1  © 5292-8550 [employer]. Care instructions adapted under license by [employer] (which disclaims liability or warranty for this information). If you have questions about a medical condition or this instruction, always ask your healthcare professional. [place] [address] any warranty or liability for your use of this information.

## 2019-09-13 LAB
BACTERIA UR CULT: ABNORMAL
BACTERIA UR CULT: ABNORMAL
OTHER ANTIBIOTIC SUSC ISLT: ABNORMAL

## 2019-09-16 ENCOUNTER — TELEPHONE (OUTPATIENT)
Dept: FAMILY MEDICINE CLINIC | Age: 59
End: 2019-09-16

## 2019-09-16 RX ORDER — NITROFURANTOIN 25; 75 MG/1; MG/1
100 CAPSULE ORAL 2 TIMES DAILY
Qty: 10 CAP | Refills: 0 | Status: SHIPPED | OUTPATIENT
Start: 2019-09-16 | End: 2019-09-21

## 2019-09-16 NOTE — TELEPHONE ENCOUNTER
----- Message from Romario Lucas NP sent at 9/16/2019  7:48 AM EDT -----  Please call patient and let him know that his urine culture returned and is positive for bacteria.   He should take all the antibiotics as prescribed, and should return with continued symptoms  Thanks

## 2019-09-16 NOTE — PROGRESS NOTES
Please call patient and let him know that his urine culture returned and is positive for bacteria.   He should take all the antibiotics as prescribed, and should return with continued symptoms  Thanks

## 2019-10-07 RX ORDER — LANSOPRAZOLE 30 MG/1
30 CAPSULE, DELAYED RELEASE ORAL
Qty: 90 CAP | Refills: 1 | Status: SHIPPED | OUTPATIENT
Start: 2019-10-07 | End: 2019-10-14 | Stop reason: ALTCHOICE

## 2019-10-08 ENCOUNTER — TELEPHONE (OUTPATIENT)
Dept: FAMILY MEDICINE CLINIC | Age: 59
End: 2019-10-08

## 2019-10-08 LAB
ALBUMIN SERPL-MCNC: 4.6 G/DL (ref 3.5–5.5)
ALBUMIN/GLOB SERPL: 2.2 {RATIO} (ref 1.2–2.2)
ALP SERPL-CCNC: 44 IU/L (ref 39–117)
ALT SERPL-CCNC: 31 IU/L (ref 0–44)
AST SERPL-CCNC: 30 IU/L (ref 0–40)
BILIRUB SERPL-MCNC: 0.4 MG/DL (ref 0–1.2)
BUN SERPL-MCNC: 14 MG/DL (ref 6–24)
BUN/CREAT SERPL: 15 (ref 9–20)
CALCIUM SERPL-MCNC: 9.6 MG/DL (ref 8.7–10.2)
CHLORIDE SERPL-SCNC: 105 MMOL/L (ref 96–106)
CHOLEST SERPL-MCNC: 177 MG/DL (ref 100–199)
CO2 SERPL-SCNC: 22 MMOL/L (ref 20–29)
CREAT SERPL-MCNC: 0.95 MG/DL (ref 0.76–1.27)
ERYTHROCYTE [DISTWIDTH] IN BLOOD BY AUTOMATED COUNT: 13.1 % (ref 12.3–15.4)
EST. AVERAGE GLUCOSE BLD GHB EST-MCNC: 131 MG/DL
GLOBULIN SER CALC-MCNC: 2.1 G/DL (ref 1.5–4.5)
GLUCOSE SERPL-MCNC: 99 MG/DL (ref 65–99)
HBA1C MFR BLD: 6.2 % (ref 4.8–5.6)
HCT VFR BLD AUTO: 46.3 % (ref 37.5–51)
HDLC SERPL-MCNC: 35 MG/DL
HGB BLD-MCNC: 15.4 G/DL (ref 13–17.7)
INTERPRETATION, 910389: NORMAL
LDLC SERPL CALC-MCNC: 106 MG/DL (ref 0–99)
Lab: NORMAL
MCH RBC QN AUTO: 31.5 PG (ref 26.6–33)
MCHC RBC AUTO-ENTMCNC: 33.3 G/DL (ref 31.5–35.7)
MCV RBC AUTO: 95 FL (ref 79–97)
PLATELET # BLD AUTO: 239 X10E3/UL (ref 150–450)
POTASSIUM SERPL-SCNC: 4.9 MMOL/L (ref 3.5–5.2)
PROT SERPL-MCNC: 6.7 G/DL (ref 6–8.5)
RBC # BLD AUTO: 4.89 X10E6/UL (ref 4.14–5.8)
SODIUM SERPL-SCNC: 142 MMOL/L (ref 134–144)
TRIGL SERPL-MCNC: 180 MG/DL (ref 0–149)
VLDLC SERPL CALC-MCNC: 36 MG/DL (ref 5–40)
WBC # BLD AUTO: 8.5 X10E3/UL (ref 3.4–10.8)

## 2019-10-08 NOTE — PROGRESS NOTES
Please call patient and let him know that labs returned and are stable.   Should return in 6 months for office visit and fasting labs  Thanks

## 2019-10-08 NOTE — TELEPHONE ENCOUNTER
----- Message from Jen Shankar NP sent at 10/8/2019  2:20 PM EDT -----  Please call patient and let him know that labs returned and are stable.   Should return in 6 months for office visit and fasting labs  Thanks

## 2019-10-09 ENCOUNTER — TELEPHONE (OUTPATIENT)
Dept: FAMILY MEDICINE CLINIC | Age: 59
End: 2019-10-09

## 2019-10-14 RX ORDER — OMEPRAZOLE 20 MG/1
20 CAPSULE, DELAYED RELEASE ORAL DAILY
Qty: 90 CAP | Refills: 1 | Status: SHIPPED | OUTPATIENT
Start: 2019-10-14 | End: 2020-04-24

## 2019-10-21 DIAGNOSIS — E78.1 HYPERTRIGLYCERIDEMIA: ICD-10-CM

## 2019-10-21 RX ORDER — FENOFIBRATE 145 MG/1
TABLET, COATED ORAL
Qty: 90 TAB | Refills: 0 | Status: SHIPPED | OUTPATIENT
Start: 2019-10-21 | End: 2020-01-08

## 2019-10-21 RX ORDER — ROSUVASTATIN CALCIUM 10 MG/1
TABLET, COATED ORAL
Qty: 90 TAB | Refills: 0 | Status: SHIPPED | OUTPATIENT
Start: 2019-10-21 | End: 2020-01-08

## 2019-12-02 ENCOUNTER — HOSPITAL ENCOUNTER (EMERGENCY)
Age: 59
Discharge: HOME | End: 2019-12-02
Attending: STUDENT IN AN ORGANIZED HEALTH CARE EDUCATION/TRAINING PROGRAM
Payer: COMMERCIAL

## 2019-12-02 ENCOUNTER — APPOINTMENT (OUTPATIENT)
Dept: CT IMAGING | Age: 59
End: 2019-12-02
Attending: EMERGENCY MEDICINE
Payer: COMMERCIAL

## 2019-12-02 VITALS
SYSTOLIC BLOOD PRESSURE: 142 MMHG | BODY MASS INDEX: 35.89 KG/M2 | DIASTOLIC BLOOD PRESSURE: 74 MMHG | WEIGHT: 264.99 LBS | HEART RATE: 61 BPM | HEIGHT: 72 IN | TEMPERATURE: 98.2 F | OXYGEN SATURATION: 99 % | RESPIRATION RATE: 16 BRPM

## 2019-12-02 DIAGNOSIS — R10.9 RIGHT FLANK PAIN: Primary | ICD-10-CM

## 2019-12-02 DIAGNOSIS — M54.6 ACUTE RIGHT-SIDED THORACIC BACK PAIN: ICD-10-CM

## 2019-12-02 DIAGNOSIS — R31.9 HEMATURIA, UNSPECIFIED TYPE: ICD-10-CM

## 2019-12-02 LAB
ALBUMIN SERPL-MCNC: 4.2 G/DL (ref 3.5–5)
ALBUMIN/GLOB SERPL: 1.5 {RATIO} (ref 1.1–2.2)
ALP SERPL-CCNC: 43 U/L (ref 45–117)
ALT SERPL-CCNC: 44 U/L (ref 12–78)
ANION GAP SERPL CALC-SCNC: 9 MMOL/L (ref 5–15)
APPEARANCE UR: CLEAR
AST SERPL-CCNC: 25 U/L (ref 15–37)
BACTERIA URNS QL MICRO: NEGATIVE /HPF
BASOPHILS # BLD: 0.1 K/UL (ref 0–0.1)
BASOPHILS NFR BLD: 1 % (ref 0–1)
BILIRUB DIRECT SERPL-MCNC: 0.1 MG/DL (ref 0–0.2)
BILIRUB SERPL-MCNC: 0.4 MG/DL (ref 0.2–1)
BILIRUB UR QL: NEGATIVE
BUN SERPL-MCNC: 15 MG/DL (ref 6–20)
BUN/CREAT SERPL: 14 (ref 12–20)
CALCIUM SERPL-MCNC: 9.2 MG/DL (ref 8.5–10.1)
CHLORIDE SERPL-SCNC: 105 MMOL/L (ref 97–108)
CO2 SERPL-SCNC: 25 MMOL/L (ref 21–32)
COLOR UR: ABNORMAL
COMMENT, HOLDF: NORMAL
CREAT SERPL-MCNC: 1.1 MG/DL (ref 0.7–1.3)
DIFFERENTIAL METHOD BLD: NORMAL
EOSINOPHIL # BLD: 0.2 K/UL (ref 0–0.4)
EOSINOPHIL NFR BLD: 2 % (ref 0–7)
EPITH CASTS URNS QL MICRO: ABNORMAL /LPF
ERYTHROCYTE [DISTWIDTH] IN BLOOD BY AUTOMATED COUNT: 14.1 % (ref 11.5–14.5)
GLOBULIN SER CALC-MCNC: 2.8 G/DL (ref 2–4)
GLUCOSE SERPL-MCNC: 76 MG/DL (ref 65–100)
GLUCOSE UR STRIP.AUTO-MCNC: NEGATIVE MG/DL
HCT VFR BLD AUTO: 45.6 % (ref 36.6–50.3)
HGB BLD-MCNC: 14.7 G/DL (ref 12.1–17)
HGB UR QL STRIP: ABNORMAL
KETONES UR QL STRIP.AUTO: NEGATIVE MG/DL
LEUKOCYTE ESTERASE UR QL STRIP.AUTO: NEGATIVE
LIPASE SERPL-CCNC: 86 U/L (ref 73–393)
LYMPHOCYTES # BLD: 3.6 K/UL
LYMPHOCYTES NFR BLD: 40 % (ref 12–49)
MCH RBC QN AUTO: 30.9 PG (ref 26–34)
MCHC RBC AUTO-ENTMCNC: 32.2 G/DL (ref 30–36.5)
MCV RBC AUTO: 95.8 FL (ref 80–99)
MONOCYTES # BLD: 0.9 K/UL (ref 0–1)
MONOCYTES NFR BLD: 10 % (ref 5–13)
NEUTS SEG # BLD: 4.3 K/UL (ref 1.8–8)
NEUTS SEG NFR BLD: 47 % (ref 32–75)
NITRITE UR QL STRIP.AUTO: NEGATIVE
PH UR STRIP: 6 [PH] (ref 5–8)
PLATELET # BLD AUTO: 207 K/UL (ref 150–400)
PMV BLD AUTO: 11.7 FL (ref 8.9–12.9)
POTASSIUM SERPL-SCNC: 4 MMOL/L (ref 3.5–5.1)
PROT SERPL-MCNC: 7 G/DL (ref 6.4–8.2)
PROT UR STRIP-MCNC: NEGATIVE MG/DL
RBC # BLD AUTO: 4.76 M/UL (ref 4.1–5.7)
RBC #/AREA URNS HPF: ABNORMAL /HPF (ref 0–5)
SAMPLES BEING HELD,HOLD: NORMAL
SODIUM SERPL-SCNC: 139 MMOL/L (ref 136–145)
SP GR UR REFRACTOMETRY: 1.01 (ref 1–1.03)
UR CULT HOLD, URHOLD: NORMAL
UROBILINOGEN UR QL STRIP.AUTO: 0.2 EU/DL (ref 0.2–1)
WBC # BLD AUTO: 9.1 K/UL (ref 4.1–11.1)
WBC URNS QL MICRO: ABNORMAL /HPF (ref 0–4)

## 2019-12-02 PROCEDURE — 85025 COMPLETE CBC W/AUTO DIFF WBC: CPT

## 2019-12-02 PROCEDURE — 74011636320 HC RX REV CODE- 636/320: Performed by: EMERGENCY MEDICINE

## 2019-12-02 PROCEDURE — 36415 COLL VENOUS BLD VENIPUNCTURE: CPT

## 2019-12-02 PROCEDURE — 80076 HEPATIC FUNCTION PANEL: CPT

## 2019-12-02 PROCEDURE — 99283 EMERGENCY DEPT VISIT LOW MDM: CPT

## 2019-12-02 PROCEDURE — 83690 ASSAY OF LIPASE: CPT

## 2019-12-02 PROCEDURE — 81001 URINALYSIS AUTO W/SCOPE: CPT

## 2019-12-02 PROCEDURE — 74177 CT ABD & PELVIS W/CONTRAST: CPT

## 2019-12-02 PROCEDURE — 96374 THER/PROPH/DIAG INJ IV PUSH: CPT

## 2019-12-02 PROCEDURE — 74011250636 HC RX REV CODE- 250/636: Performed by: EMERGENCY MEDICINE

## 2019-12-02 PROCEDURE — 80048 BASIC METABOLIC PNL TOTAL CA: CPT

## 2019-12-02 RX ORDER — KETOROLAC TROMETHAMINE 30 MG/ML
30 INJECTION, SOLUTION INTRAMUSCULAR; INTRAVENOUS
Status: COMPLETED | OUTPATIENT
Start: 2019-12-02 | End: 2019-12-02

## 2019-12-02 RX ORDER — CYCLOBENZAPRINE HCL 10 MG
10 TABLET ORAL
Qty: 20 TAB | Refills: 0 | Status: SHIPPED | OUTPATIENT
Start: 2019-12-02 | End: 2019-12-16 | Stop reason: ALTCHOICE

## 2019-12-02 RX ORDER — SODIUM CHLORIDE 0.9 % (FLUSH) 0.9 %
5-40 SYRINGE (ML) INJECTION AS NEEDED
Status: DISCONTINUED | OUTPATIENT
Start: 2019-12-02 | End: 2019-12-02 | Stop reason: HOSPADM

## 2019-12-02 RX ORDER — METHYLPREDNISOLONE 4 MG/1
TABLET ORAL
Qty: 1 DOSE PACK | Refills: 0 | Status: SHIPPED | OUTPATIENT
Start: 2019-12-02 | End: 2019-12-16 | Stop reason: ALTCHOICE

## 2019-12-02 RX ORDER — SODIUM CHLORIDE 0.9 % (FLUSH) 0.9 %
5-40 SYRINGE (ML) INJECTION EVERY 8 HOURS
Status: DISCONTINUED | OUTPATIENT
Start: 2019-12-02 | End: 2019-12-02 | Stop reason: HOSPADM

## 2019-12-02 RX ADMIN — IOPAMIDOL 100 ML: 755 INJECTION, SOLUTION INTRAVENOUS at 20:06

## 2019-12-02 RX ADMIN — Medication 10 ML: at 19:01

## 2019-12-02 RX ADMIN — KETOROLAC TROMETHAMINE 30 MG: 30 INJECTION, SOLUTION INTRAMUSCULAR at 19:01

## 2019-12-02 RX ADMIN — SODIUM CHLORIDE 1000 ML: 900 INJECTION, SOLUTION INTRAVENOUS at 19:01

## 2019-12-02 NOTE — ED TRIAGE NOTES
Patient complaining of right side flank and back pain that started last Thursday.  Patient denies fever, n/v.

## 2019-12-03 NOTE — ED NOTES
Patient tolerated medications well and states pain is better. Patient resting on the stretcher in no distress and watching TV. Call bell within reach; will continue to monitor.

## 2019-12-03 NOTE — ED NOTES
The patient was discharged home by provider in stable condition. The patient is alert and oriented, in no respiratory distress. The patient's diagnosis, condition and treatment were explained. The patient expressed understanding. Two prescriptions given. A discharge plan has been developed. A  was not involved in the process. Aftercare instructions were given. Pt ambulatory out of the ED.

## 2019-12-03 NOTE — ED PROVIDER NOTES
This is a 28-year-old gentleman comes emergency room with right flank pain. Patient said his pain started last Thursday. Patient states that eating does not affect his pain. Patient states that he was seen by his family doctor couple weeks ago for similar symptoms. Patient was initially prescribed Flexeril for his back pain which seemed to help a little bit. However, the patient was later called back and told that he had a UTI was placed on an antibiotic. Patient currently denies any fever or chills. Patient denies any nausea or vomiting. Patient denies any diarrhea constipation. Abdominal Pain    This is a new problem. The current episode started more than 2 days ago. The problem occurs daily. The problem has been gradually worsening. The pain is located in the RUQ and RLQ. The quality of the pain is sharp. The pain is at a severity of 8/10. The pain is moderate. Pertinent negatives include no fever, no diarrhea, no melena, no nausea, no vomiting, no constipation, no dysuria, no frequency, no hematuria, no myalgias, no trauma, no chest pain and no back pain. The pain is worsened by palpation. The pain is relieved by nothing. Past workup includes colonoscopy. His past medical history is significant for GERD, UTI and DM. His past medical history does not include PUD. The patient's surgical history includes appendectomy. Past Medical History:   Diagnosis Date    Arthritis     Chronic pain     LOWER BACK , LEFT ARM    Deviated septum     Diabetes (HCC)     BORDERLINE PER PATIENT    GERD (gastroesophageal reflux disease)     High cholesterol     Hypertension     Unspecified sleep apnea 2014    NO C-PAP       Past Surgical History:   Procedure Laterality Date    HX APPENDECTOMY      HX GI      COLONOSCOPY    HX HEENT  2014    Tonsils and uvula removed.     HX OTHER SURGICAL      WISDOM TEETH EXTRACTION    HX OTHER SURGICAL      EXPLORATORY LAP    HX SEPTOPLASTY           Family History: Problem Relation Age of Onset    Heart Disease Mother     Dementia Mother         [employer] Bog[facility] Anesth Problems Maternal Aunt         TROUBLE BREATHING AFTER HIP SURGERY       Social History     Socioeconomic History    Marital status:      Spouse name: Not on file    Number of children: Not on file    Years of education: Not on file    Highest education level: Not on file   Occupational History    Not on file   Social Needs    Financial resource strain: Not on file    Food insecurity:     Worry: Not on file     Inability: Not on file    Transportation needs:     Medical: Not on file     Non-medical: Not on file   Tobacco Use    Smoking status: Current Every Day Smoker     Packs/day: 1.50     Years: 21.00     Pack years: 31.50    Smokeless tobacco: Never Used   Substance and Sexual Activity    Alcohol use: Yes     Alcohol/week: 1.0 standard drinks     Types: 1 Shots of liquor per week    Drug use: No    Sexual activity: Yes     Partners: Female   Lifestyle    Physical activity:     Days per week: Not on file     Minutes per session: Not on file    Stress: Not on file   Relationships    Social connections:     Talks on phone: Not on file     Gets together: Not on file     Attends [employer] service: Not on file     Active member of club or organization: Not on file     Attends meetings of clubs or organizations: Not on file     Relationship status: Not on file    Intimate partner violence:     Fear of current or ex partner: Not on file     Emotionally abused: Not on file     Physically abused: Not on file     Forced sexual activity: Not on file   Other Topics Concern    Not on file   Social History Narrative    Not on file     ALLERGIES: Vioxx [rofecoxib]    Review of Systems   Constitutional: Negative for appetite change, chills, fever and unexpected weight change. HENT: Negative for ear pain, hearing loss, rhinorrhea and trouble swallowing.     Eyes: Negative for pain and visual disturbance. Respiratory: Negative for cough, chest tightness and shortness of breath. Cardiovascular: Negative for chest pain and palpitations. Gastrointestinal: Positive for abdominal pain. Negative for abdominal distention, blood in stool, constipation, diarrhea, melena, nausea and vomiting. Genitourinary: Positive for flank pain. Negative for dysuria, frequency, hematuria and urgency. Musculoskeletal: Negative for back pain and myalgias. Skin: Negative for rash. Neurological: Negative for dizziness, syncope, weakness and numbness. Psychiatric/Behavioral: Negative for confusion and suicidal ideas. All other systems reviewed and are negative. Vitals:    12/02/19 1824 12/02/19 2013   BP: (!) 197/92 142/74   Pulse: 79 61   Resp: 16 16   Temp: 98.2 °F (36.8 °C) 98.2 °F (36.8 °C)   SpO2: 99% 99%   Weight: 120.2 kg (264 lb 15.9 oz)    Height: 6' (1.829 m)             Physical Exam  Vitals signs and nursing note reviewed. Constitutional:       General: He is not in acute distress. Appearance: He is well-developed. He is not diaphoretic. HENT:      Head: Normocephalic and atraumatic. Right Ear: External ear normal.      Left Ear: External ear normal.      Nose: Nose normal.      Mouth/Throat:      Mouth: Mucous membranes are moist.      Pharynx: No oropharyngeal exudate. Eyes:      General: No scleral icterus. Right eye: No discharge. Left eye: No discharge. Conjunctiva/sclera: Conjunctivae normal.      Pupils: Pupils are equal, round, and reactive to light. Neck:      Musculoskeletal: Normal range of motion and neck supple. Vascular: No JVD. Trachea: No tracheal deviation. Cardiovascular:      Rate and Rhythm: Normal rate and regular rhythm. Heart sounds: Normal heart sounds. No murmur. No friction rub. No gallop. Pulmonary:      Effort: Pulmonary effort is normal. No respiratory distress. Breath sounds: Normal breath sounds.  No stridor. No decreased breath sounds, wheezing, rhonchi or rales. Chest:      Chest wall: No tenderness. Abdominal:      General: Bowel sounds are normal. There is no distension. Palpations: Abdomen is soft. Tenderness: There is tenderness in the right upper quadrant and right lower quadrant. There is no guarding or rebound. Hernia: No hernia is present. Musculoskeletal: Normal range of motion. General: No tenderness. Skin:     General: Skin is warm and dry. Capillary Refill: Capillary refill takes less than 2 seconds. Coloration: Skin is not pale. Findings: No erythema or rash. Neurological:      Mental Status: He is alert and oriented to person, place, and time. GCS: GCS eye subscore is 4. GCS verbal subscore is 5. GCS motor subscore is 6. Cranial Nerves: No cranial nerve deficit. Sensory: No sensory deficit. Motor: No abnormal muscle tone. Coordination: Coordination normal.      Deep Tendon Reflexes: Reflexes are normal and symmetric. Reflexes normal.   Psychiatric:         Mood and Affect: Mood normal. Mood is not anxious or depressed. Behavior: Behavior normal.         Thought Content:  Thought content normal.         Judgment: Judgment normal.          MDM  Number of Diagnoses or Management Options  Acute right-sided thoracic back pain:   Hematuria, unspecified type:   Right flank pain:      Amount and/or Complexity of Data Reviewed  Clinical lab tests: ordered and reviewed  Tests in the radiology section of CPT®: ordered and reviewed    Risk of Complications, Morbidity, and/or Mortality  Presenting problems: moderate  Diagnostic procedures: moderate  Management options: moderate    Patient Progress  Patient progress: stable       Procedures    Chief Complaint   Patient presents with    Abdominal Pain       The patient's presenting problems have been discussed, and they are in agreement with the care plan formulated and outlined with them. I have encouraged them to ask questions as they arise throughout their visit. MEDICATIONS GIVEN:  Medications   sodium chloride (NS) flush 5-40 mL (has no administration in time range)   sodium chloride (NS) flush 5-40 mL (10 mL IntraVENous Given 12/2/19 1901)   sodium chloride 0.9 % bolus infusion 1,000 mL (0 mL IntraVENous IV Completed 12/2/19 2010)   ketorolac (TORADOL) injection 30 mg (30 mg IntraVENous Given 12/2/19 1901)   iopamidol (ISOVUE-370) 76 % injection 100 mL (100 mL IntraVENous Given 12/2/19 2006)       LABS REVIEWED:  Recent Results (from the past 24 hour(s))   URINALYSIS W/MICROSCOPIC    Collection Time: 12/02/19  6:54 PM   Result Value Ref Range    Color YELLOW/STRAW      Appearance CLEAR CLEAR      Specific gravity 1.015 1.003 - 1.030      pH (UA) 6.0 5.0 - 8.0      Protein NEGATIVE  NEG mg/dL    Glucose NEGATIVE  NEG mg/dL    Ketone NEGATIVE  NEG mg/dL    Bilirubin NEGATIVE  NEG      Blood TRACE (A) NEG      Urobilinogen 0.2 0.2 - 1.0 EU/dL    Nitrites NEGATIVE  NEG      Leukocyte Esterase NEGATIVE  NEG      WBC 0-4 0 - 4 /hpf    RBC 5-10 0 - 5 /hpf    Epithelial cells FEW FEW /lpf    Bacteria NEGATIVE  NEG /hpf   URINE CULTURE HOLD SAMPLE    Collection Time: 12/02/19  6:54 PM   Result Value Ref Range    Urine culture hold        URINE ON HOLD IN MICROBIOLOGY DEPT FOR 3 DAYS. IF UNPRESERVED URINE IS SUBMITTED, IT CANNOT BE USED FOR ADDITIONAL TESTING AFTER 24 HRS, RECOLLECTION WILL BE REQUIRED.    CBC WITH AUTOMATED DIFF    Collection Time: 12/02/19  7:03 PM   Result Value Ref Range    WBC 9.1 4.1 - 11.1 K/uL    RBC 4.76 4.10 - 5.70 M/uL    HGB 14.7 12.1 - 17.0 g/dL    HCT 45.6 36.6 - 50.3 %    MCV 95.8 80.0 - 99.0 FL    MCH 30.9 26.0 - 34.0 PG    MCHC 32.2 30.0 - 36.5 g/dL    RDW 14.1 11.5 - 14.5 %    PLATELET 595 487 - 696 K/uL    MPV 11.7 8.9 - 12.9 FL    NEUTROPHILS 47 32 - 75 %    LYMPHOCYTES 40 12 - 49 %    MONOCYTES 10 5 - 13 %    EOSINOPHILS 2 0 - 7 %    BASOPHILS 1 0 - 1 % ABS. NEUTROPHILS 4.3 1.8 - 8.0 K/UL    ABS. LYMPHOCYTES 3.6 K/UL    ABS. MONOCYTES 0.9 0.0 - 1.0 K/UL    ABS. EOSINOPHILS 0.2 0.0 - 0.4 K/UL    ABS. BASOPHILS 0.1 0.0 - 0.1 K/UL    DF AUTOMATED     METABOLIC PANEL, BASIC    Collection Time: 12/02/19  7:03 PM   Result Value Ref Range    Sodium 139 136 - 145 mmol/L    Potassium 4.0 3.5 - 5.1 mmol/L    Chloride 105 97 - 108 mmol/L    CO2 25 21 - 32 mmol/L    Anion gap 9 5 - 15 mmol/L    Glucose 76 65 - 100 mg/dL    BUN 15 6 - 20 MG/DL    Creatinine 1.10 0.70 - 1.30 MG/DL    BUN/Creatinine ratio 14 12 - 20      GFR est AA >60 >60 ml/min/1.73m2    GFR est non-AA >60 >60 ml/min/1.73m2    Calcium 9.2 8.5 - 10.1 MG/DL   LIPASE    Collection Time: 12/02/19  7:03 PM   Result Value Ref Range    Lipase 86 73 - 393 U/L   HEPATIC FUNCTION PANEL    Collection Time: 12/02/19  7:03 PM   Result Value Ref Range    Protein, total 7.0 6.4 - 8.2 g/dL    Albumin 4.2 3.5 - 5.0 g/dL    Globulin 2.8 2.0 - 4.0 g/dL    A-G Ratio 1.5 1.1 - 2.2      Bilirubin, total 0.4 0.2 - 1.0 MG/DL    Bilirubin, direct 0.1 0.0 - 0.2 MG/DL    Alk. phosphatase 43 (L) 45 - 117 U/L    AST (SGOT) 25 15 - 37 U/L    ALT (SGPT) 44 12 - 78 U/L   SAMPLES BEING HELD    Collection Time: 12/02/19  7:03 PM   Result Value Ref Range    SAMPLES BEING HELD 1 RED 1 BLUE     COMMENT        Add-on orders for these samples will be processed based on acceptable specimen integrity and analyte stability, which may vary by analyte.        VITAL SIGNS:  Patient Vitals for the past 24 hrs:   Temp Pulse Resp BP SpO2   12/02/19 2013 98.2 °F (36.8 °C) 61 16 142/74 99 %   12/02/19 1824 98.2 °F (36.8 °C) 79 16 (!) 197/92 99 %       RADIOLOGY RESULTS:  The following have been ordered and reviewed:  Ct Abd Pelv W Cont    Result Date: 12/2/2019  INDICATION: right flank pain, hematuria COMPARISON: No comparisons TECHNIQUE: Following the uneventful intravenous administration of 100 cc Isovue-370, thin axial images were obtained through the abdomen and pelvis. Coronal and sagittal reconstructions were generated. Oral contrast was not administered. CT dose reduction was achieved through use of a standardized protocol tailored for this examination and automatic exposure control for dose modulation. Adaptive statistical iterative reconstruction (ASIR) was utilized. FINDINGS: LUNG BASES: Clear. INCIDENTALLY IMAGED HEART AND MEDIASTINUM: Coronary atherosclerotic disease LIVER: Hepatic steatosis GALLBLADDER: Unremarkable. SPLEEN: No mass. PANCREAS: No mass or ductal dilatation. ADRENALS: Unremarkable. KIDNEYS: Hypodensities in the right kidney measures likely representing simple cysts. STOMACH: Thickening of the gastric fundus. SMALL BOWEL: No dilatation or wall thickening. COLON: Colonic diverticulosis APPENDIX: Appendectomy PERITONEUM: No ascites or pneumoperitoneum. RETROPERITONEUM: No lymphadenopathy or aortic aneurysm. REPRODUCTIVE ORGANS: Unremarkable URINARY BLADDER: Unremarkable BONES: No destructive bone lesion. ADDITIONAL COMMENTS: N/A     IMPRESSION: 1. No acute intra-abdominal pathology. Simple right renal cysts. No evidence of renal stones or masses. 2.  Hepatic steatosis. Colonic diverticulosis. Suggestion of thickening of the gastric fundus which may be related to incomplete distention. PROGRESS NOTES:  Discussed results and plan with patient. Patient will be discharged home with PCP follow up. Patient instructed to return to the emergency room for any worsening symptoms or any other concerns. DIAGNOSIS:    1. Right flank pain    2. Hematuria, unspecified type    3.  Acute right-sided thoracic back pain        PLAN:  Follow-up Information     Follow up With Specialties Details Why Contact Fermin Barreto NP Nurse Practitioner In 1 week  [address] [place], [place] [id number] Jose [facility] [address]      [address] [place] [address] DEPT Emergency Medicine  If symptoms worsen [address] [facility] [address]  [phone]        Discharge Medication List as of 12/2/2019  8:46 PM      START taking these medications    Details   !! cyclobenzaprine (FLEXERIL) 10 mg tablet Take 1 Tab by mouth three (3) times daily as needed for Muscle Spasm(s). , Print, Disp-20 Tab, R-0      methylPREDNISolone (MEDROL, IGOR,) 4 mg tablet Take as directed. , Print, Disp-1 Dose Pack, R-0       !! - Potential duplicate medications found. Please discuss with provider. CONTINUE these medications which have NOT CHANGED    Details   fenofibrate nanocrystallized (TRICOR) 145 mg tablet TAKE 1 TABLET DAILY, Normal, Disp-90 Tab, R-0      rosuvastatin (CRESTOR) 10 mg tablet TAKE 1 TABLET DAILY, Normal, Disp-90 Tab, R-0      omeprazole (PRILOSEC) 20 mg capsule Take 1 Cap by mouth daily. , Normal, Disp-90 Cap, R-1      valsartan-hydroCHLOROthiazide (DIOVAN HCT) 160-12.5 mg per tablet Take 1 Tab by mouth once over twenty-four (24) hours. , Historical Med      candesartan (ATACAND) 16 mg tablet TAKE 1 TABLET DAILY, Normal, Disp-90 Tab, R-0      metFORMIN ER (GLUCOPHAGE XR) 500 mg tablet Take 2 Tabs by mouth two (2) times daily (after meals). Indications: type 2 diabetes mellitus, Mail Order, Disp-360 Tab, R-1      ergocalciferol (ERGOCALCIFEROL) 50,000 unit capsule Take 1 Cap by mouth every seven (7) days. Indications: Vitamin D Deficiency, Normal, Disp-15 Cap, R-3      !! cyclobenzaprine (FLEXERIL) 10 mg tablet Take 1 Tab by mouth three (3) times daily as needed for Muscle Spasm(s). , Normal, Disp-30 Tab, R-0      albuterol (PROVENTIL HFA, VENTOLIN HFA, PROAIR HFA) 90 mcg/actuation inhaler Take 2 Puffs by inhalation every four (4) hours as needed for Wheezing., Normal, Disp-1 Inhaler, R-0      glucose blood VI test strips (ASCENSIA AUTODISC VI, ONE TOUCH ULTRA TEST VI) strip Dispense One Touch Ultra test strips. To check blood sugar once daily. Dx: E11.9, Normal, Disp-100 Strip, R-2      Lancets misc Dispense Delica lancets.   To test blood sugar once daily. Dx: E11.9, Normal, Disp-1 Each, R-11       !! - Potential duplicate medications found. Please discuss with provider. ED COURSE: The patient's hospital course has been uncomplicated.

## 2019-12-03 NOTE — DISCHARGE INSTRUCTIONS
We hope that we have addressed all of your medical concerns. The examination and treatment you received in the Emergency Department were for an emergent problem and were not intended as complete care. It is important that you follow up with your healthcare provider(s) for ongoing care. If your symptoms worsen or do not improve as expected, and you are unable to reach your usual health care provider(s), you should return to the Emergency Department. Today's healthcare is undergoing tremendous change, and patient satisfaction surveys are one of the many tools to assess the quality of medical care. You may receive a survey from the [employer] organization regarding your experience in the Emergency Department. I hope that your experience has been completely positive, particularly the medical care that I provided. As such, please participate in the survey; anything less than excellent does not meet my expectations or intentions. [facility] and 8 [facility] participate in nationally recognized quality of care measures. If your blood pressure is greater than 120/80, as reported below, we urge that you seek medical care to address the potential of high blood pressure, commonly known as hypertension. Hypertension can be hereditary or can be caused by certain medical conditions, pain, stress, or \"white coat syndrome. \"       Please make an appointment with your health care provider(s) for follow up of your Emergency Department visit. VITALS:   Patient Vitals for the past 8 hrs:   Temp Pulse Resp BP SpO2   12/02/19 2013 98.2 °F (36.8 °C) 61 16 142/74 99 %   12/02/19 1824 98.2 °F (36.8 °C) 79 16 (!) 197/92 99 %          Thank you for allowing us to provide you with medical care today. We realize that you have many choices for your emergency care needs. Please choose us in the future for any continued health care needs. Nanci Suárez [address], [address]   Office: [phone]            Recent Results (from the past 24 hour(s))   URINALYSIS W/MICROSCOPIC    Collection Time: 12/02/19  6:54 PM   Result Value Ref Range    Color YELLOW/STRAW      Appearance CLEAR CLEAR      Specific gravity 1.015 1.003 - 1.030      pH (UA) 6.0 5.0 - 8.0      Protein NEGATIVE  NEG mg/dL    Glucose NEGATIVE  NEG mg/dL    Ketone NEGATIVE  NEG mg/dL    Bilirubin NEGATIVE  NEG      Blood TRACE (A) NEG      Urobilinogen 0.2 0.2 - 1.0 EU/dL    Nitrites NEGATIVE  NEG      Leukocyte Esterase NEGATIVE  NEG      WBC 0-4 0 - 4 /hpf    RBC 5-10 0 - 5 /hpf    Epithelial cells FEW FEW /lpf    Bacteria NEGATIVE  NEG /hpf   URINE CULTURE HOLD SAMPLE    Collection Time: 12/02/19  6:54 PM   Result Value Ref Range    Urine culture hold        URINE ON HOLD IN MICROBIOLOGY DEPT FOR 3 DAYS. IF UNPRESERVED URINE IS SUBMITTED, IT CANNOT BE USED FOR ADDITIONAL TESTING AFTER 24 HRS, RECOLLECTION WILL BE REQUIRED. CBC WITH AUTOMATED DIFF    Collection Time: 12/02/19  7:03 PM   Result Value Ref Range    WBC 9.1 4.1 - 11.1 K/uL    RBC 4.76 4.10 - 5.70 M/uL    HGB 14.7 12.1 - 17.0 g/dL    HCT 45.6 36.6 - 50.3 %    MCV 95.8 80.0 - 99.0 FL    MCH 30.9 26.0 - 34.0 PG    MCHC 32.2 30.0 - 36.5 g/dL    RDW 14.1 11.5 - 14.5 %    PLATELET 619 055 - 154 K/uL    MPV 11.7 8.9 - 12.9 FL    NEUTROPHILS 47 32 - 75 %    LYMPHOCYTES 40 12 - 49 %    MONOCYTES 10 5 - 13 %    EOSINOPHILS 2 0 - 7 %    BASOPHILS 1 0 - 1 %    ABS. NEUTROPHILS 4.3 1.8 - 8.0 K/UL    ABS. LYMPHOCYTES 3.6 K/UL    ABS. MONOCYTES 0.9 0.0 - 1.0 K/UL    ABS. EOSINOPHILS 0.2 0.0 - 0.4 K/UL    ABS.  BASOPHILS 0.1 0.0 - 0.1 K/UL    DF AUTOMATED     METABOLIC PANEL, BASIC    Collection Time: 12/02/19  7:03 PM   Result Value Ref Range    Sodium 139 136 - 145 mmol/L    Potassium 4.0 3.5 - 5.1 mmol/L    Chloride 105 97 - 108 mmol/L    CO2 25 21 - 32 mmol/L    Anion gap 9 5 - 15 mmol/L    Glucose 76 65 - 100 mg/dL BUN 15 6 - 20 MG/DL    Creatinine 1.10 0.70 - 1.30 MG/DL    BUN/Creatinine ratio 14 12 - 20      GFR est AA >60 >60 ml/min/1.73m2    GFR est non-AA >60 >60 ml/min/1.73m2    Calcium 9.2 8.5 - 10.1 MG/DL   LIPASE    Collection Time: 12/02/19  7:03 PM   Result Value Ref Range    Lipase 86 73 - 393 U/L   HEPATIC FUNCTION PANEL    Collection Time: 12/02/19  7:03 PM   Result Value Ref Range    Protein, total 7.0 6.4 - 8.2 g/dL    Albumin 4.2 3.5 - 5.0 g/dL    Globulin 2.8 2.0 - 4.0 g/dL    A-G Ratio 1.5 1.1 - 2.2      Bilirubin, total 0.4 0.2 - 1.0 MG/DL    Bilirubin, direct 0.1 0.0 - 0.2 MG/DL    Alk. phosphatase 43 (L) 45 - 117 U/L    AST (SGOT) 25 15 - 37 U/L    ALT (SGPT) 44 12 - 78 U/L   SAMPLES BEING HELD    Collection Time: 12/02/19  7:03 PM   Result Value Ref Range    SAMPLES BEING HELD 1 RED 1 BLUE     COMMENT        Add-on orders for these samples will be processed based on acceptable specimen integrity and analyte stability, which may vary by analyte. Ct Abd Pelv W Cont    Result Date: 12/2/2019  INDICATION: right flank pain, hematuria COMPARISON: No comparisons TECHNIQUE: Following the uneventful intravenous administration of 100 cc Isovue-370, thin axial images were obtained through the abdomen and pelvis. Coronal and sagittal reconstructions were generated. Oral contrast was not administered. CT dose reduction was achieved through use of a standardized protocol tailored for this examination and automatic exposure control for dose modulation. Adaptive statistical iterative reconstruction (ASIR) was utilized. FINDINGS: LUNG BASES: Clear. INCIDENTALLY IMAGED HEART AND MEDIASTINUM: Coronary atherosclerotic disease LIVER: Hepatic steatosis GALLBLADDER: Unremarkable. SPLEEN: No mass. PANCREAS: No mass or ductal dilatation. ADRENALS: Unremarkable. KIDNEYS: Hypodensities in the right kidney measures likely representing simple cysts. STOMACH: Thickening of the gastric fundus.  SMALL BOWEL: No dilatation or wall thickening. COLON: Colonic diverticulosis APPENDIX: Appendectomy PERITONEUM: No ascites or pneumoperitoneum. RETROPERITONEUM: No lymphadenopathy or aortic aneurysm. REPRODUCTIVE ORGANS: Unremarkable URINARY BLADDER: Unremarkable BONES: No destructive bone lesion. ADDITIONAL COMMENTS: N/A     IMPRESSION: 1. No acute intra-abdominal pathology. Simple right renal cysts. No evidence of renal stones or masses. 2.  Hepatic steatosis. Colonic diverticulosis. Suggestion of thickening of the gastric fundus which may be related to incomplete distention. Patient Education        Abdominal Pain: Care Instructions  Your Care Instructions    Abdominal pain has many possible causes. Some aren't serious and get better on their own in a few days. Others need more testing and treatment. If your pain continues or gets worse, you need to be rechecked and may need more tests to find out what is wrong. You may need surgery to correct the problem. Don't ignore new symptoms, such as fever, nausea and vomiting, urination problems, pain that gets worse, and dizziness. These may be signs of a more serious problem. Your doctor may have recommended a follow-up visit in the next 8 to 12 hours. If you are not getting better, you may need more tests or treatment. The doctor has checked you carefully, but problems can develop later. If you notice any problems or new symptoms, get medical treatment right away. Follow-up care is a key part of your treatment and safety. Be sure to make and go to all appointments, and call your doctor if you are having problems. It's also a good idea to know your test results and keep a list of the medicines you take. How can you care for yourself at home? · Rest until you feel better. · To prevent dehydration, drink plenty of fluids, enough so that your urine is light yellow or clear like water. Choose water and other caffeine-free clear liquids until you feel better.  If you have kidney, heart, or liver disease and have to limit fluids, talk with your doctor before you increase the amount of fluids you drink. · If your stomach is upset, eat mild foods, such as rice, dry toast or crackers, bananas, and applesauce. Try eating several small meals instead of two or three large ones. · Wait until 48 hours after all symptoms have gone away before you have spicy foods, alcohol, and drinks that contain caffeine. · Do not eat foods that are high in fat. · Avoid anti-inflammatory medicines such as aspirin, ibuprofen (Advil, Motrin), and naproxen (Aleve). These can cause stomach upset. Talk to your doctor if you take daily aspirin for another health problem. When should you call for help? Call 911 anytime you think you may need emergency care. For example, call if:    · You passed out (lost consciousness).     · You pass maroon or very bloody stools.     · You vomit blood or what looks like coffee grounds.     · You have new, severe belly pain.    Call your doctor now or seek immediate medical care if:    · Your pain gets worse, especially if it becomes focused in one area of your belly.     · You have a new or higher fever.     · Your stools are black and look like tar, or they have streaks of blood.     · You have unexpected vaginal bleeding.     · You have symptoms of a urinary tract infection. These may include:  ? Pain when you urinate. ? Urinating more often than usual.  ? Blood in your urine.     · You are dizzy or lightheaded, or you feel like you may faint.    Watch closely for changes in your health, and be sure to contact your doctor if:    · You are not getting better after 1 day (24 hours). Where can you learn more? Go to http://laney-leidy.info/. Enter H155 in the search box to learn more about \"Abdominal Pain: Care Instructions. \"  Current as of: June 26, 2019  Content Version: 12.2  © 8613-4346 [employer].  Care instructions adapted under license by [facility] (which disclaims liability or warranty for this information). If you have questions about a medical condition or this instruction, always ask your healthcare professional. [address] any warranty or liability for your use of this information. Patient Education        Back Pain: Care Instructions  Your Care Instructions    Back pain has many possible causes. It is often related to problems with muscles and ligaments of the back. It may also be related to problems with the nerves, discs, or bones of the back. Moving, lifting, standing, sitting, or sleeping in an awkward way can strain the back. Sometimes you don't notice the injury until later. Arthritis is another common cause of back pain. Although it may hurt a lot, back pain usually improves on its own within several weeks. Most people recover in 12 weeks or less. Using good home treatment and being careful not to stress your back can help you feel better sooner. Follow-up care is a key part of your treatment and safety. Be sure to make and go to all appointments, and call your doctor if you are having problems. It's also a good idea to know your test results and keep a list of the medicines you take. How can you care for yourself at home? · Sit or lie in positions that are most comfortable and reduce your pain. Try one of these positions when you lie down:  ? Lie on your back with your knees bent and supported by large pillows. ? Lie on the floor with your legs on the seat of a sofa or chair. ? Lie on your side with your knees and hips bent and a pillow between your legs. ? Lie on your stomach if it does not make pain worse. · Do not sit up in bed, and avoid soft couches and twisted positions. Bed rest can help relieve pain at first, but it delays healing. Avoid bed rest after the first day of back pain. · Change positions every 30 minutes. If you must sit for long periods of time, take breaks from sitting.  Get up and walk around, or lie in a comfortable position. · Try using a heating pad on a low or medium setting for 15 to 20 minutes every 2 or 3 hours. Try a warm shower in place of one session with the heating pad. · You can also try an ice pack for 10 to 15 minutes every 2 to 3 hours. Put a thin cloth between the ice pack and your skin. · Take pain medicines exactly as directed. ? If the doctor gave you a prescription medicine for pain, take it as prescribed. ? If you are not taking a prescription pain medicine, ask your doctor if you can take an over-the-counter medicine. · Take short walks several times a day. You can start with 5 to 10 minutes, 3 or 4 times a day, and work up to longer walks. Walk on level surfaces and avoid hills and stairs until your back is better. · Return to work and other activities as soon as you can. Continued rest without activity is usually not good for your back. · To prevent future back pain, do exercises to stretch and strengthen your back and stomach. Learn how to use good posture, safe lifting techniques, and proper body mechanics. When should you call for help? Call your doctor now or seek immediate medical care if:    · You have new or worsening numbness in your legs.     · You have new or worsening weakness in your legs. (This could make it hard to stand up.)     · You lose control of your bladder or bowels.    Watch closely for changes in your health, and be sure to contact your doctor if:    · You have a fever, lose weight, or don't feel well.     · You do not get better as expected. Where can you learn more? Go to http://laney-leidy.info/. Enter V447 in the search box to learn more about \"Back Pain: Care Instructions. \"  Current as of: June 26, 2019  Content Version: 12.2  © 2649-1093 [employer]. Care instructions adapted under license by [employer] (which disclaims liability or warranty for this information).  If you have questions about a medical condition or this instruction, always ask your healthcare professional. [address] any warranty or liability for your use of this information. Patient Education        Blood in the Urine: Care Instructions  Your Care Instructions    Blood in the urine, or hematuria, may make the urine look red, brown, or pink. There may be blood every time you urinate or just from time to time. You cannot always see blood in the urine, but it will show up in a urine test.  Blood in the urine may be serious. It should always be checked by a doctor. Your doctor may recommend more tests, including an X-ray, a CT scan, or a cystoscopy (which lets a doctor look inside the urethra and bladder). Blood in the urine can be a sign of another problem. Common causes are bladder infections and kidney stones. An injury to your groin or your genital area can also cause bleeding in the urinary tract. Very hard exercise--such as running a marathon--can cause blood in the urine. Blood in the urine can also be a sign of kidney disease or cancer in the bladder or kidney. Many cases of blood in the urine are caused by a harmless condition that runs in families. This is called benign familial hematuria. It does not need any treatment. Sometimes your urine may look red or brown even though it does not contain blood. For example, not getting enough fluids (dehydration), taking certain medicines, or having a liver problem can change the color of your urine. Eating foods such as beets, rhubarb, or blackberries or foods with red food coloring can make your urine look red or pink. Follow-up care is a key part of your treatment and safety. Be sure to make and go to all appointments, and call your doctor if you are having problems. It's also a good idea to know your test results and keep a list of the medicines you take. When should you call for help?   Call your doctor now or seek immediate medical care if:    · You have symptoms of a urinary infection. For example:  ? You have pus in your urine. ? You have pain in your back just below your rib cage. This is called flank pain. ? You have a fever, chills, or body aches. ? It hurts to urinate. ? You have groin or belly pain.     · You have more blood in your urine.    Watch closely for changes in your health, and be sure to contact your doctor if:    · You have new urination problems.     · You do not get better as expected. Where can you learn more? Go to http://laney-leidy.info/. Enter C407 in the search box to learn more about \"Blood in the Urine: Care Instructions. \"  Current as of: December 19, 2018  Content Version: 12.2  © 9189-5113 [employer]. Care instructions adapted under license by [employer] (which disclaims liability or warranty for this information). If you have questions about a medical condition or this instruction, always ask your healthcare professional. [place] [address] any warranty or liability for your use of this information.

## 2019-12-16 ENCOUNTER — OFFICE VISIT (OUTPATIENT)
Dept: FAMILY MEDICINE CLINIC | Age: 59
End: 2019-12-16

## 2019-12-16 ENCOUNTER — TELEPHONE (OUTPATIENT)
Dept: FAMILY MEDICINE CLINIC | Age: 59
End: 2019-12-16

## 2019-12-16 VITALS
HEART RATE: 92 BPM | DIASTOLIC BLOOD PRESSURE: 82 MMHG | SYSTOLIC BLOOD PRESSURE: 121 MMHG | RESPIRATION RATE: 18 BRPM | OXYGEN SATURATION: 96 % | TEMPERATURE: 98.4 F | HEIGHT: 72 IN | BODY MASS INDEX: 34.54 KG/M2 | WEIGHT: 255 LBS

## 2019-12-16 DIAGNOSIS — R30.0 BURNING WITH URINATION: ICD-10-CM

## 2019-12-16 DIAGNOSIS — M25.551 ACUTE RIGHT HIP PAIN: ICD-10-CM

## 2019-12-16 DIAGNOSIS — R30.9 URINARY PAIN: ICD-10-CM

## 2019-12-16 DIAGNOSIS — R30.9 URINARY PAIN: Primary | ICD-10-CM

## 2019-12-16 LAB
BILIRUB UR QL STRIP: ABNORMAL
GLUCOSE UR-MCNC: NEGATIVE MG/DL
KETONES P FAST UR STRIP-MCNC: NEGATIVE MG/DL
PH UR STRIP: 5.5 [PH] (ref 4.6–8)
PROT UR QL STRIP: ABNORMAL
SP GR UR STRIP: 1.03 (ref 1–1.03)
UA UROBILINOGEN AMB POC: ABNORMAL (ref 0.2–1)
URINALYSIS CLARITY POC: CLEAR
URINALYSIS COLOR POC: ABNORMAL
URINE BLOOD POC: NEGATIVE
URINE LEUKOCYTES POC: NEGATIVE
URINE NITRITES POC: NEGATIVE

## 2019-12-16 NOTE — PATIENT INSTRUCTIONS
Back Pain: Care Instructions  Your Care Instructions    Back pain has many possible causes. It is often related to problems with muscles and ligaments of the back. It may also be related to problems with the nerves, discs, or bones of the back. Moving, lifting, standing, sitting, or sleeping in an awkward way can strain the back. Sometimes you don't notice the injury until later. Arthritis is another common cause of back pain. Although it may hurt a lot, back pain usually improves on its own within several weeks. Most people recover in 12 weeks or less. Using good home treatment and being careful not to stress your back can help you feel better sooner. Follow-up care is a key part of your treatment and safety. Be sure to make and go to all appointments, and call your doctor if you are having problems. It's also a good idea to know your test results and keep a list of the medicines you take. How can you care for yourself at home? · Sit or lie in positions that are most comfortable and reduce your pain. Try one of these positions when you lie down:  ? Lie on your back with your knees bent and supported by large pillows. ? Lie on the floor with your legs on the seat of a sofa or chair. ? Lie on your side with your knees and hips bent and a pillow between your legs. ? Lie on your stomach if it does not make pain worse. · Do not sit up in bed, and avoid soft couches and twisted positions. Bed rest can help relieve pain at first, but it delays healing. Avoid bed rest after the first day of back pain. · Change positions every 30 minutes. If you must sit for long periods of time, take breaks from sitting. Get up and walk around, or lie in a comfortable position. · Try using a heating pad on a low or medium setting for 15 to 20 minutes every 2 or 3 hours. Try a warm shower in place of one session with the heating pad. · You can also try an ice pack for 10 to 15 minutes every 2 to 3 hours.  Put a thin cloth between the ice pack and your skin. · Take pain medicines exactly as directed. ? If the doctor gave you a prescription medicine for pain, take it as prescribed. ? If you are not taking a prescription pain medicine, ask your doctor if you can take an over-the-counter medicine. · Take short walks several times a day. You can start with 5 to 10 minutes, 3 or 4 times a day, and work up to longer walks. Walk on level surfaces and avoid hills and stairs until your back is better. · Return to work and other activities as soon as you can. Continued rest without activity is usually not good for your back. · To prevent future back pain, do exercises to stretch and strengthen your back and stomach. Learn how to use good posture, safe lifting techniques, and proper body mechanics. When should you call for help? Call your doctor now or seek immediate medical care if:    · You have new or worsening numbness in your legs.     · You have new or worsening weakness in your legs. (This could make it hard to stand up.)     · You lose control of your bladder or bowels.    Watch closely for changes in your health, and be sure to contact your doctor if:    · You have a fever, lose weight, or don't feel well.     · You do not get better as expected. Where can you learn more? Go to http://laney-leidy.info/. Enter L508 in the search box to learn more about \"Back Pain: Care Instructions. \"  Current as of: June 26, 2019  Content Version: 12.2  © 5135-0612 [employer]. Care instructions adapted under license by [employer] (which disclaims liability or warranty for this information). If you have questions about a medical condition or this instruction, always ask your healthcare professional. [place] [address] any warranty or liability for your use of this information.

## 2019-12-16 NOTE — PROGRESS NOTES
HISTORY OF PRESENT ILLNESS  Rob Sawyer is a 64 y.o. male. HPI   Pt presents with \"right sided back pain\"  Pt was seen in ER for the pain on 12/2, CT of Abd and Pelv were normal.  Pt describes the pain as sharp and achy, and is on right right lower ribs. Certain movements make it worse, such as sneezing, and turning. He believes that the pain in muscular, as he has injured this area prior. Pt was seen for pain in our office in September, and states that the medrol dose pack did help the pain, but it returned about a week after the steroids finished. Pt states that he had this pain about 10 years ago, in the exact same area. He had a full work up from urology, and ortho, and all was negative. Pt is not interested in further work up of the pain, as he believes that the pain is muscular, and he does not want to do a full work up for no resolution. Review of Systems   Constitutional: Negative for fever. HENT: Negative for congestion. Gastrointestinal: Negative for diarrhea and vomiting. Musculoskeletal: Positive for back pain. Physical Exam  Constitutional:       Appearance: Normal appearance. HENT:      Head: Normocephalic and atraumatic. Neck:      Musculoskeletal: Normal range of motion and neck supple. Cardiovascular:      Rate and Rhythm: Normal rate and regular rhythm. Heart sounds: Normal heart sounds. Pulmonary:      Effort: Pulmonary effort is normal.      Breath sounds: Normal breath sounds. Neurological:      Mental Status: He is alert. ASSESSMENT and PLAN    ICD-10-CM ICD-9-CM    1. Urinary pain R30.9 788.1 AMB POC URINALYSIS DIP STICK MANUAL W/O MICRO      CULTURE, URINE   2. Burning with urination R30.0 788.1 AMB POC URINALYSIS DIP STICK MANUAL W/O MICRO      CULTURE, URINE   3. Acute right hip pain M25.551 719.45 AMB POC URINALYSIS DIP STICK MANUAL W/O MICRO     Educated that we will notify when urine culture returns.     Educated that x-ray of spine to rule out ortho problem, as well as PT, could be indicated. Pt declines both at this time, but will notify office should he change his mind. Educated about notifying office should pain continue and/or worsen. Pt informed to return to office with worsening of symptoms, or PRN with any questions or concerns. Pt verbalizes understanding of plan of care and denies further questions or concerns at this time.

## 2019-12-16 NOTE — PROGRESS NOTES
Identified pt with two pt identifiers(name and ). Chief Complaint   Patient presents with    Urinary Burning    Hip Pain     on the right side since the last time he was seen - d/t pain he was seen in the ER 19 and was given muscle relaxers and steroids which seemed to help some, however, pain has become worse since completing steroids - has been using aleve around the clock everyday    Medication Refill     is out of fenofibrate        Health Maintenance Due   Topic    FOOT EXAM Q1     Shingrix Vaccine Age 50> (1 of 2)    MICROALBUMIN Q1     FOBT Q 1 YEAR AGE 50-75        Wt Readings from Last 3 Encounters:   19 255 lb (115.7 kg)   19 264 lb 15.9 oz (120.2 kg)   09/10/19 250 lb (113.4 kg)     Temp Readings from Last 3 Encounters:   19 98.4 °F (36.9 °C) (Oral)   19 98.2 °F (36.8 °C)   09/10/19 97.1 °F (36.2 °C) (Oral)     BP Readings from Last 3 Encounters:   19 121/82   19 142/74   09/10/19 134/80     Pulse Readings from Last 3 Encounters:   19 92   19 61   09/10/19 75         Learning Assessment:  :     Learning Assessment 2017   PRIMARY LEARNER Patient   BARRIERS PRIMARY LEARNER NONE   CO-LEARNER CAREGIVER No   PRIMARY LANGUAGE ENGLISH   LEARNER PREFERENCE PRIMARY DEMONSTRATION     LISTENING   ANSWERED BY patient   RELATIONSHIP SELF       Depression Screening:  :     3 most recent PHQ Screens 9/10/2019   Little interest or pleasure in doing things Not at all   Feeling down, depressed, irritable, or hopeless Not at all   Total Score PHQ 2 0       Fall Risk Assessment:  :     Fall Risk Assessment, last 12 mths 3/12/2018   Able to walk? Yes   Fall in past 12 months? No       Abuse Screening:  :     Abuse Screening Questionnaire 9/10/2019 3/12/2018   Do you ever feel afraid of your partner? N N   Are you in a relationship with someone who physically or mentally threatens you? N N   Is it safe for you to go home?  Jolene Hand       Coordination of Care Questionnaire:  :     1) Have you been to an emergency room, urgent care clinic since your last visit? yes   Hospitalized since your last visit? no             2) Have you seen or consulted any other health care providers outside of [address] [place] [address] since your last visit? no  (Include any pap smears or colon screenings in this section.)    3) Do you have an Advance Directive on file? no  Are you interested in receiving information about Advance Directives? no    Reviewed record in preparation for visit and have obtained necessary documentation. Medication reconciliation up to date and corrected with patient at this time. Order for POC U/A Testing placed per [facility] Jorge's verbal order.

## 2019-12-16 NOTE — TELEPHONE ENCOUNTER
The pt is confused as to why he is supposed to stop taking the flexeril?     Best call back number is [phone]

## 2019-12-17 NOTE — TELEPHONE ENCOUNTER
Can you let him know and answer any questions he may have?  It was on his after visit summary as \"Medications to stop\"

## 2019-12-17 NOTE — TELEPHONE ENCOUNTER
Pt was advised this medication was removed from his active medications as he advised the office he still has some of the medication on hand but is not actively using it. He was advised it is ok to use medication prn and verbalized understanding.

## 2019-12-19 LAB
BACTERIA UR CULT: NORMAL
BACTERIA UR CULT: NORMAL

## 2019-12-19 NOTE — PROGRESS NOTES
Please call patient and let him know that his urine culture returned and is negative, no UTI  Thanks

## 2019-12-23 ENCOUNTER — TELEPHONE (OUTPATIENT)
Dept: FAMILY MEDICINE CLINIC | Age: 59
End: 2019-12-23

## 2019-12-23 NOTE — TELEPHONE ENCOUNTER
Pt reports the pain is in the same area on the right just below the rib cage. He was advised per Wen ELLINGTON that he should see urology to f/u, as previously discussed at his last office visit as pain is still present. He verbalized understanding.

## 2019-12-23 NOTE — TELEPHONE ENCOUNTER
The pt is calling to request a referral to see someone regarding a possible hernia, he states he is still having pain.      Best contact number is [phone]

## 2020-01-03 ENCOUNTER — OFFICE VISIT (OUTPATIENT)
Dept: FAMILY MEDICINE CLINIC | Age: 60
End: 2020-01-03

## 2020-01-03 VITALS
BODY MASS INDEX: 34.54 KG/M2 | DIASTOLIC BLOOD PRESSURE: 89 MMHG | SYSTOLIC BLOOD PRESSURE: 139 MMHG | HEART RATE: 99 BPM | RESPIRATION RATE: 18 BRPM | WEIGHT: 255 LBS | TEMPERATURE: 99.2 F | HEIGHT: 72 IN | OXYGEN SATURATION: 97 %

## 2020-01-03 DIAGNOSIS — J40 BRONCHITIS: Primary | ICD-10-CM

## 2020-01-03 RX ORDER — AZITHROMYCIN 250 MG/1
TABLET, FILM COATED ORAL
Qty: 6 TAB | Refills: 0 | Status: SHIPPED | OUTPATIENT
Start: 2020-01-03 | End: 2020-01-06

## 2020-01-03 RX ORDER — KETOROLAC TROMETHAMINE 10 MG/1
10 TABLET, FILM COATED ORAL
COMMUNITY
Start: 2019-12-26 | End: 2021-03-22

## 2020-01-03 RX ORDER — BENZONATATE 100 MG/1
100 CAPSULE ORAL
Qty: 21 CAP | Refills: 0 | Status: SHIPPED | OUTPATIENT
Start: 2020-01-03 | End: 2020-01-10

## 2020-01-03 RX ORDER — ALBUTEROL SULFATE 90 UG/1
2 AEROSOL, METERED RESPIRATORY (INHALATION)
Qty: 1 INHALER | Refills: 0 | Status: SHIPPED | OUTPATIENT
Start: 2020-01-03 | End: 2020-01-27

## 2020-01-03 NOTE — PROGRESS NOTES
Identified pt with two pt identifiers(name and ). Chief Complaint   Patient presents with    Cough     sx have been occurring x 7 days    Nasal Congestion    Sneezing        Health Maintenance Due   Topic    FOOT EXAM Q1     Shingrix Vaccine Age 50> (1 of 2)    MICROALBUMIN Q1     FOBT Q 1 YEAR AGE 50-75        Wt Readings from Last 3 Encounters:   20 255 lb (115.7 kg)   19 255 lb (115.7 kg)   19 264 lb 15.9 oz (120.2 kg)     Temp Readings from Last 3 Encounters:   20 99.2 °F (37.3 °C) (Oral)   19 98.4 °F (36.9 °C) (Oral)   19 98.2 °F (36.8 °C)     BP Readings from Last 3 Encounters:   20 139/89   19 121/82   19 142/74     Pulse Readings from Last 3 Encounters:   20 99   19 92   19 61         Learning Assessment:  :     Learning Assessment 2017   PRIMARY LEARNER Patient   BARRIERS PRIMARY LEARNER NONE   CO-LEARNER CAREGIVER No   PRIMARY LANGUAGE ENGLISH   LEARNER PREFERENCE PRIMARY DEMONSTRATION     LISTENING   ANSWERED BY patient   RELATIONSHIP SELF       Depression Screening:  :     3 most recent PHQ Screens 9/10/2019   Little interest or pleasure in doing things Not at all   Feeling down, depressed, irritable, or hopeless Not at all   Total Score PHQ 2 0       Fall Risk Assessment:  :     Fall Risk Assessment, last 12 mths 3/12/2018   Able to walk? Yes   Fall in past 12 months? No       Abuse Screening:  :     Abuse Screening Questionnaire 9/10/2019 3/12/2018   Do you ever feel afraid of your partner? N N   Are you in a relationship with someone who physically or mentally threatens you? N N   Is it safe for you to go home?  Y Y       Coordination of Care Questionnaire:  :     1) Have you been to an emergency room, urgent care clinic since your last visit? no   Hospitalized since your last visit? no             2) Have you seen or consulted any other health care providers outside of [address] [place] [address] since your last visit? no  (Include any pap smears or colon screenings in this section.)    3) Do you have an Advance Directive on file? no  Are you interested in receiving information about Advance Directives? no    Reviewed record in preparation for visit and have obtained necessary documentation. Medication reconciliation up to date and corrected with patient at this time.

## 2020-01-03 NOTE — PROGRESS NOTES
HISTORY OF PRESENT ILLNESS  Indiana Hammonds is a 64 y.o. male. HPI  Pt presents with \"cough\"    Symptoms have been present for about a week  Cough is dry  Cough is constant  Nasal congestion is also noted  No fever  OTC: none  Review of Systems   Constitutional: Negative for fever. HENT: Positive for congestion. Respiratory: Positive for cough. Negative for sputum production. Gastrointestinal: Negative for diarrhea and vomiting. Physical Exam  Constitutional:       Appearance: Normal appearance. HENT:      Head: Normocephalic and atraumatic. Right Ear: Tympanic membrane normal.      Left Ear: Tympanic membrane normal.      Nose: Congestion present. Neck:      Musculoskeletal: Normal range of motion and neck supple. Cardiovascular:      Rate and Rhythm: Normal rate and regular rhythm. Heart sounds: Normal heart sounds. Pulmonary:      Effort: Pulmonary effort is normal.      Breath sounds: Examination of the right-lower field reveals rhonchi. Examination of the left-lower field reveals rhonchi. Rhonchi present. No wheezing. Neurological:      General: No focal deficit present. Mental Status: He is alert and oriented to person, place, and time. ASSESSMENT and PLAN    ICD-10-CM ICD-9-CM    1. Bronchitis J40 490 azithromycin (ZITHROMAX) 250 mg tablet      albuterol (PROVENTIL HFA, VENTOLIN HFA, PROAIR HFA) 90 mcg/actuation inhaler      benzonatate (TESSALON PERLES) 100 mg capsule     Educated about taking medication as prescribed, with food  Should stay well hydrated, and treat fever as needed    Pt informed to return to office with worsening of symptoms, or PRN with any questions or concerns. Pt verbalizes understanding of plan of care and denies further questions or concerns at this time.

## 2020-01-05 ENCOUNTER — TELEPHONE (OUTPATIENT)
Dept: FAMILY MEDICINE CLINIC | Age: 60
End: 2020-01-05

## 2020-01-05 DIAGNOSIS — J40 BRONCHITIS: ICD-10-CM

## 2020-01-05 RX ORDER — AZITHROMYCIN 250 MG/1
TABLET, FILM COATED ORAL
Qty: 3 TAB | Refills: 0 | Status: SHIPPED | OUTPATIENT
Start: 2020-01-05 | End: 2020-05-01 | Stop reason: ALTCHOICE

## 2020-01-05 NOTE — PROGRESS NOTES
On-call physician note  The patient called me to notify me that accidentally part of his Amna Bess was thrown away, requesting three pills of azithromycin in order to complete his dosing, took two pills the first day and one till the following day, has three more days left of treatment.  Appropriate treatment sent to his pharmacy per his request

## 2020-01-06 RX ORDER — AZITHROMYCIN 250 MG/1
TABLET, FILM COATED ORAL
Qty: 6 TAB | Refills: 0 | Status: SHIPPED | OUTPATIENT
Start: 2020-01-06 | End: 2020-05-01 | Stop reason: ALTCHOICE

## 2020-01-08 DIAGNOSIS — E78.1 HYPERTRIGLYCERIDEMIA: ICD-10-CM

## 2020-01-08 RX ORDER — FENOFIBRATE 145 MG/1
TABLET, COATED ORAL
Qty: 90 TAB | Refills: 0 | Status: SHIPPED | OUTPATIENT
Start: 2020-01-08 | End: 2020-04-07

## 2020-01-08 RX ORDER — ROSUVASTATIN CALCIUM 10 MG/1
TABLET, COATED ORAL
Qty: 90 TAB | Refills: 0 | Status: SHIPPED | OUTPATIENT
Start: 2020-01-08 | End: 2020-04-07

## 2020-01-14 ENCOUNTER — TELEPHONE (OUTPATIENT)
Dept: FAMILY MEDICINE CLINIC | Age: 60
End: 2020-01-14

## 2020-01-14 DIAGNOSIS — Z12.11 SCREENING FOR COLON CANCER: Primary | ICD-10-CM

## 2020-01-14 NOTE — TELEPHONE ENCOUNTER
Pt needs referral to be sent to [facility] for tomorrow's appt.

## 2020-01-14 NOTE — TELEPHONE ENCOUNTER
----- Message from Humphrey Ormond sent at 1/14/2020 12:15 PM EST -----  Regarding: Lawyer Swanson - NP/Telephone  Referral    Caller (first and last name if not the patient or from practice):      Caller's relationship to patient (if not from a practice):      Name of caller (if calling from a practice):      Name of practice: [facility]      Specialist's title, first, and last name: Dr. Elsa Paiz      Office Phone Number: [phone]      Fax number: [phone]      Date and time of appointment: 01/15/2020 8:30 a.m      Reason for appointment: Preliminary-Colonoscopy       Details to clarify the request: Patient's contact info: [phone]      Humphrey Ormond

## 2020-01-26 DIAGNOSIS — J40 BRONCHITIS: ICD-10-CM

## 2020-01-27 RX ORDER — ALBUTEROL SULFATE 90 UG/1
AEROSOL, METERED RESPIRATORY (INHALATION)
Qty: 6.7 INHALER | Refills: 0 | Status: SHIPPED | OUTPATIENT
Start: 2020-01-27 | End: 2020-09-08

## 2020-04-07 DIAGNOSIS — E78.1 HYPERTRIGLYCERIDEMIA: ICD-10-CM

## 2020-04-07 RX ORDER — ROSUVASTATIN CALCIUM 10 MG/1
TABLET, COATED ORAL
Qty: 90 TAB | Refills: 0 | Status: SHIPPED | OUTPATIENT
Start: 2020-04-07 | End: 2020-07-06

## 2020-04-07 RX ORDER — FENOFIBRATE 145 MG/1
TABLET, COATED ORAL
Qty: 90 TAB | Refills: 0 | Status: SHIPPED | OUTPATIENT
Start: 2020-04-07 | End: 2020-07-06

## 2020-04-24 RX ORDER — OMEPRAZOLE 20 MG/1
CAPSULE, DELAYED RELEASE ORAL
Qty: 90 CAP | Refills: 1 | Status: SHIPPED | OUTPATIENT
Start: 2020-04-24 | End: 2020-09-11

## 2020-05-01 ENCOUNTER — VIRTUAL VISIT (OUTPATIENT)
Dept: FAMILY MEDICINE CLINIC | Age: 60
End: 2020-05-01

## 2020-05-01 VITALS — HEIGHT: 72 IN | BODY MASS INDEX: 34.58 KG/M2

## 2020-05-01 DIAGNOSIS — E78.1 HYPERTRIGLYCERIDEMIA: ICD-10-CM

## 2020-05-01 DIAGNOSIS — G47.00 INSOMNIA, UNSPECIFIED TYPE: ICD-10-CM

## 2020-05-01 DIAGNOSIS — E11.9 CONTROLLED TYPE 2 DIABETES MELLITUS WITHOUT COMPLICATION, WITHOUT LONG-TERM CURRENT USE OF INSULIN (HCC): Primary | ICD-10-CM

## 2020-05-01 RX ORDER — HYDROXYZINE PAMOATE 100 MG/1
100 CAPSULE ORAL
Qty: 30 CAP | Refills: 2 | Status: SHIPPED | OUTPATIENT
Start: 2020-05-01 | End: 2020-09-01 | Stop reason: SINTOL

## 2020-05-01 NOTE — PROGRESS NOTES
HISTORY OF PRESENT ILLNESS  Faizan Wharton is a 64 y.o. male. HPI  Pt presents with \"medication review\"  Visit was conducted via [email or web address]  Pt was located at home, provider was located at [facility]  Visit lasted  6 minutes  Consent: Faizan Wharton, who was seen by synchronous (real-time) audio-video technology, and/or his healthcare decision maker, is aware that this patient-initiated, Telehealth encounter on 5/1/2020 is a billable service, with coverage as determined by his insurance carrier. He is aware that he may receive a bill and has provided verbal consent to proceed: Yes. Pt states that he is under a lot of stress and anxiety, with the stressors of COVID. He has gained some weight, and is worried that his blood sugar is increasing again. He is currently taking 750 mg Metformin, BID. Blood sugar this am before eating was 136. He has had trouble sleeping due to anxiety, and is wondering if we could prescribe something for him to try. Review of Systems   Constitutional: Negative for fever. Physical Exam  Constitutional:       Appearance: Normal appearance. Neurological:      Mental Status: He is alert. Psychiatric:         Mood and Affect: Mood normal.         Behavior: Behavior normal.         ASSESSMENT and PLAN    ICD-10-CM ICD-9-CM    1. Controlled type 2 diabetes mellitus without complication, without long-term current use of insulin ([facility]) E11.9 250.00 CBC W/O DIFF      METABOLIC PANEL, COMPREHENSIVE      HEMOGLOBIN A1C WITH EAG   2. Hypertriglyceridemia E78.1 272.1 LIPID PANEL   3. Insomnia, unspecified type G47.00 780.52 hydrOXYzine pamoate (VISTARIL) 100 mg capsule     Will notify when labs return  Educated about taking medications as prescribed,a dn focusing on diabetic diet    Pt informed to return to office with worsening of symptoms, or PRN with any questions or concerns.   Pt verbalizes understanding of plan of care and denies further questions or concerns at this time.

## 2020-05-01 NOTE — PROGRESS NOTES
Identified pt with two pt identifiers(name and ). Chief Complaint   Patient presents with    Diabetes     blood sugars have been high and it was 136 today    Medication Refill        Health Maintenance Due   Topic    Foot Exam Q1     Shingrix Vaccine Age 50> (1 of 2)    MICROALBUMIN Q1        Wt Readings from Last 3 Encounters:   20 255 lb (115.7 kg)   19 255 lb (115.7 kg)   19 264 lb 15.9 oz (120.2 kg)     Temp Readings from Last 3 Encounters:   20 99.2 °F (37.3 °C) (Oral)   19 98.4 °F (36.9 °C) (Oral)   19 98.2 °F (36.8 °C)     BP Readings from Last 3 Encounters:   20 139/89   19 121/82   19 142/74     Pulse Readings from Last 3 Encounters:   20 99   19 92   19 61         Learning Assessment:  :     Learning Assessment 2017   PRIMARY LEARNER Patient   BARRIERS PRIMARY LEARNER NONE   CO-LEARNER CAREGIVER No   PRIMARY LANGUAGE ENGLISH   LEARNER PREFERENCE PRIMARY DEMONSTRATION     LISTENING   ANSWERED BY patient   RELATIONSHIP SELF       Depression Screening:  :     3 most recent PHQ Screens 9/10/2019   Little interest or pleasure in doing things Not at all   Feeling down, depressed, irritable, or hopeless Not at all   Total Score PHQ 2 0       Fall Risk Assessment:  :     Fall Risk Assessment, last 12 mths 3/12/2018   Able to walk? Yes   Fall in past 12 months? No       Abuse Screening:  :     Abuse Screening Questionnaire 9/10/2019 3/12/2018   Do you ever feel afraid of your partner? N N   Are you in a relationship with someone who physically or mentally threatens you? N N   Is it safe for you to go home?  Y Y       Coordination of Care Questionnaire:  :     1) Have you been to an emergency room, urgent care clinic since your last visit? no   Hospitalized since your last visit? no             2) Have you seen or consulted any other health care providers outside of [address] [place] [address] since your last visit? no  (Include any pap smears or colon screenings in this section.)    3) Do you have an Advance Directive on file? no  Are you interested in receiving information about Advance Directives? no    Reviewed record in preparation for visit and have obtained necessary documentation. Medication reconciliation up to date and corrected with patient at this time.

## 2020-05-08 LAB
ALBUMIN SERPL-MCNC: 4.4 G/DL (ref 3.8–4.9)
ALBUMIN/GLOB SERPL: 1.9 {RATIO} (ref 1.2–2.2)
ALP SERPL-CCNC: 53 IU/L
ALT SERPL-CCNC: 47 IU/L
AST SERPL-CCNC: 33 IU/L (ref 0–40)
BILIRUB SERPL-MCNC: 0.3 MG/DL (ref 0–1.2)
BUN SERPL-MCNC: 13 MG/DL
BUN/CREAT SERPL: 13
CALCIUM SERPL-MCNC: 9.3 MG/DL
CHLORIDE SERPL-SCNC: 106 MMOL/L (ref 96–106)
CHOLEST SERPL-MCNC: 166 MG/DL
CO2 SERPL-SCNC: 22 MMOL/L (ref 20–29)
CREAT SERPL-MCNC: 0.98 MG/DL
ERYTHROCYTE [DISTWIDTH] IN BLOOD BY AUTOMATED COUNT: 13 %
GLOBULIN SER CALC-MCNC: 2.3 G/DL (ref 1.5–4.5)
GLUCOSE SERPL-MCNC: 112 MG/DL (ref 65–99)
HBA1C MFR BLD: 7.3 % (ref 4.8–5.6)
HCT VFR BLD AUTO: 47.1 %
HDLC SERPL-MCNC: 32 MG/DL
HGB BLD-MCNC: 15.9 G/DL
INTERPRETATION, 910389: NORMAL
LDLC SERPL CALC-MCNC: 92 MG/DL
Lab: NORMAL
MCH RBC QN AUTO: 30.3 PG
MCHC RBC AUTO-ENTMCNC: 33.8 G/DL
MCV RBC AUTO: 90 FL
PLATELET # BLD AUTO: 261 X10E3/UL (ref 150–450)
POTASSIUM SERPL-SCNC: 4.7 MMOL/L (ref 3.5–5.2)
PROT SERPL-MCNC: 6.7 G/DL (ref 6–8.5)
RBC # BLD AUTO: 5.25 X10E6/UL
SODIUM SERPL-SCNC: 141 MMOL/L (ref 134–144)
TRIGL SERPL-MCNC: 210 MG/DL (ref ?–150)
VLDLC SERPL CALC-MCNC: 42 MG/DL (ref 5–40)
WBC # BLD AUTO: 9.7 X10E3/UL (ref 3.4–10.8)

## 2020-05-08 NOTE — PROGRESS NOTES
Pt reports he has been taking 1,000 mg of metformin once daily. Pt also noted that he has put on some weight probably d/t stress. He is aware we will call him next week with medication update.

## 2020-05-08 NOTE — PROGRESS NOTES
Please call patient and let him know that his labs returned:  1. Glucose and HgbA1C  Are elevated. What exact dose of Metformin is he taking?   Thanks

## 2020-05-11 RX ORDER — METFORMIN HYDROCHLORIDE 1000 MG/1
1000 TABLET ORAL 2 TIMES DAILY WITH MEALS
Qty: 180 TAB | Refills: 0 | Status: SHIPPED | OUTPATIENT
Start: 2020-05-11 | End: 2020-10-06

## 2020-05-11 NOTE — PROGRESS NOTES
Pt was advised via detailed telephone message on home phone. I have also mailed a letter to his home with results and recommendations.

## 2020-05-11 NOTE — PROGRESS NOTES
We will increase his Metformin to 1,000 mg BID.   Should return to office in 3 months for office visit and fasting labs  Thanks

## 2020-07-06 DIAGNOSIS — E78.1 HYPERTRIGLYCERIDEMIA: ICD-10-CM

## 2020-07-06 RX ORDER — FENOFIBRATE 145 MG/1
TABLET, COATED ORAL
Qty: 90 TAB | Refills: 0 | Status: SHIPPED | OUTPATIENT
Start: 2020-07-06 | End: 2020-10-08

## 2020-07-06 RX ORDER — ROSUVASTATIN CALCIUM 10 MG/1
TABLET, COATED ORAL
Qty: 90 TAB | Refills: 0 | Status: SHIPPED | OUTPATIENT
Start: 2020-07-06 | End: 2020-10-08

## 2020-09-01 ENCOUNTER — OFFICE VISIT (OUTPATIENT)
Dept: FAMILY MEDICINE CLINIC | Age: 60
End: 2020-09-01
Payer: COMMERCIAL

## 2020-09-01 VITALS
SYSTOLIC BLOOD PRESSURE: 132 MMHG | WEIGHT: 262 LBS | BODY MASS INDEX: 35.49 KG/M2 | OXYGEN SATURATION: 98 % | HEART RATE: 88 BPM | TEMPERATURE: 98.2 F | HEIGHT: 72 IN | DIASTOLIC BLOOD PRESSURE: 78 MMHG | RESPIRATION RATE: 16 BRPM

## 2020-09-01 DIAGNOSIS — I10 ESSENTIAL HYPERTENSION: ICD-10-CM

## 2020-09-01 DIAGNOSIS — E11.9 CONTROLLED TYPE 2 DIABETES MELLITUS WITHOUT COMPLICATION, WITHOUT LONG-TERM CURRENT USE OF INSULIN (HCC): Primary | ICD-10-CM

## 2020-09-01 DIAGNOSIS — E66.01 SEVERE OBESITY (HCC): ICD-10-CM

## 2020-09-01 LAB
ALBUMIN UR QL STRIP: 30 MG/L
CREATININE, URINE POC: 200 MG/DL
MICROALBUMIN/CREAT RATIO POC: <30 MG/G

## 2020-09-01 PROCEDURE — 3051F HG A1C>EQUAL 7.0%<8.0%: CPT | Performed by: NURSE PRACTITIONER

## 2020-09-01 PROCEDURE — 82044 UR ALBUMIN SEMIQUANTITATIVE: CPT | Performed by: NURSE PRACTITIONER

## 2020-09-01 PROCEDURE — 99213 OFFICE O/P EST LOW 20 MIN: CPT | Performed by: NURSE PRACTITIONER

## 2020-09-01 NOTE — PROGRESS NOTES
Subjective:     Rob Sawyer is a 64 y.o. male seen for follow up of diabetes. He also has hypertension and hyperlipidemia. Diabetic Review of Systems - medication compliance: compliant most of the time, diabetic diet compliance: compliant most of the time. Other symptoms and concerns: Pt continues to have foot pain, right foot. He was seen by podiatrist and they recommended a MRI of the foot, but he is uncertain if he wants to go forth with this. Patient Active Problem List    Diagnosis Date Noted    Severe obesity ([facility] 09/01/2020    Essential hypertension 03/12/2018    Hypertriglyceridemia 10/16/2017    HNP (herniated nucleus pulposus), cervical 08/28/2017    Neck pain 08/21/2017    Hepatic steatosis 03/02/2017    Hyperlipidemia 03/02/2017    Fatigue 03/02/2017    Diabetes mellitus type 2, controlled ([facility] [address] 03/02/2017    Insomnia 01/18/2017    Abnormal chest x-ray 01/18/2017    Obstructive sleep apnea 05/09/2013     Current Outpatient Medications   Medication Sig Dispense Refill    fenofibrate nanocrystallized (TRICOR) 145 mg tablet TAKE 1 TABLET DAILY 90 Tab 0    rosuvastatin (CRESTOR) 10 mg tablet TAKE 1 TABLET DAILY 90 Tab 0    metFORMIN (GLUCOPHAGE) 1,000 mg tablet Take 1 Tab by mouth two (2) times daily (with meals). 180 Tab 0    omeprazole (PRILOSEC) 20 mg capsule TAKE 1 CAPSULE BY MOUTH EVERY DAY 90 Cap 1    albuterol (PROVENTIL HFA, VENTOLIN HFA, PROAIR HFA) 90 mcg/actuation inhaler INHALE 2 PUFFS BY MOUTH EVERY 4 HOURS AS NEEDED FOR WHEEZE 6.7 Inhaler 0    ketorolac (TORADOL) 10 mg tablet Take 10 mg by mouth daily as needed.  valsartan-hydroCHLOROthiazide (DIOVAN HCT) 160-12.5 mg per tablet Take 1 Tab by mouth once over twenty-four (24) hours.  glucose blood VI test strips (ASCENSIA AUTODISC VI, ONE TOUCH ULTRA TEST VI) strip Dispense One Touch Ultra test strips. To check blood sugar once daily. Dx: E11.9 100 Strip 2    Lancets misc Dispense Delica lancets. To test blood sugar once daily. Dx: E11.9 1 Each 11    candesartan (ATACAND) 16 mg tablet TAKE 1 TABLET DAILY 90 Tab 0    ergocalciferol (ERGOCALCIFEROL) 50,000 unit capsule Take 1 Cap by mouth every seven (7) days. Indications: Vitamin D Deficiency (Patient taking differently: Take 50,000 Units by mouth every seven (7) days. OUT OF MEDICATION  Indications: low vitamin D levels) 15 Cap 3     Allergies   Allergen Reactions    Vioxx [Rofecoxib] Swelling and Other (comments)     SWELLING OF UVULA, DIFFICULTY BREATHING     Past Medical History:   Diagnosis Date    Arthritis     Chronic pain     LOWER BACK , LEFT ARM    Deviated septum     Diabetes (HCC)     BORDERLINE PER PATIENT    GERD (gastroesophageal reflux disease)     High cholesterol     Hypertension     Unspecified sleep apnea 2014    NO C-PAP     Past Surgical History:   Procedure Laterality Date    HX APPENDECTOMY      HX GI      COLONOSCOPY    HX HEENT  2014    Tonsils and uvula removed.  HX OTHER SURGICAL      WISDOM TEETH EXTRACTION    HX OTHER SURGICAL      EXPLORATORY LAP    HX SEPTOPLASTY       Family History   Problem Relation Age of Onset    Heart Disease Mother     Dementia Mother         ALZHEIMER'S    Anesth Problems Maternal Aunt         TROUBLE BREATHING AFTER HIP SURGERY     Social History     Tobacco Use    Smoking status: Current Every Day Smoker     Packs/day: 1.50     Years: 21.00     Pack years: 31.50    Smokeless tobacco: Never Used   Substance Use Topics    Alcohol use:  Yes     Alcohol/week: 1.0 standard drinks     Types: 1 Shots of liquor per week        Lab Results   Component Value Date/Time    WBC 9.7 05/07/2020 03:49 AM    HGB 15.9 05/07/2020 03:49 AM    HCT 47.1 05/07/2020 03:49 AM    PLATELET 099 52/97/8854 03:49 AM    MCV 90 05/07/2020 03:49 AM     Lab Results   Component Value Date/Time    Hemoglobin A1c 7.3 (H) 05/07/2020 03:49 AM    Hemoglobin A1c 6.2 (H) 10/07/2019 10:05 AM    Hemoglobin A1c 6.1 (H) 01/16/2019 09:18 AM    Glucose 112 (H) 05/07/2020 03:49 AM    Glucose (POC) 240 (H) 08/29/2017 11:49 AM    LDL, calculated 92 05/07/2020 03:49 AM    Creatinine 0.98 05/07/2020 03:49 AM      Lab Results   Component Value Date/Time    Cholesterol, total 166 05/07/2020 03:49 AM    HDL Cholesterol 32 (L) 05/07/2020 03:49 AM    LDL, calculated 92 05/07/2020 03:49 AM    Triglyceride 210 05/07/2020 03:49 AM     Lab Results   Component Value Date/Time    GFR est non-AA CANCELED 05/07/2020 03:49 AM    GFR est AA CANCELED 05/07/2020 03:49 AM    Creatinine 0.98 05/07/2020 03:49 AM    BUN 13 05/07/2020 03:49 AM    Sodium 141 05/07/2020 03:49 AM    Potassium 4.7 05/07/2020 03:49 AM    Chloride 106 05/07/2020 03:49 AM    CO2 22 05/07/2020 03:49 AM        Review of Systems  A comprehensive review of systems was negative. Objective:     Visit Vitals  /78   Pulse 88   Temp 98.2 °F (36.8 °C) (Oral)   Resp 16   Ht 6' (1.829 m)   Wt 262 lb (118.8 kg)   SpO2 98%   BMI 35.53 kg/m²     Appearance: alert, well appearing, and in no distress. Exam: heart sounds normal rate, regular rhythm, normal S1, S2, no murmurs, rubs, clicks or gallops, chest clear  Lab review: orders written for new lab studies as appropriate; see orders. Assessment/Plan:     diabetes control uncertain, hypertension stable, hyperlipidemia stable. Diabetic issues reviewed with him: diabetic diet discussed in detail, written exchange diet given. ICD-10-CM ICD-9-CM    1. Controlled type 2 diabetes mellitus without complication, without long-term current use of insulin (HCC)  E11.9 250.00 CBC W/O DIFF      METABOLIC PANEL, COMPREHENSIVE      HEMOGLOBIN A1C WITH EAG      AMB POC URINE, MICROALBUMIN, SEMIQUANT (3 RESULTS)   2. Severe obesity ([address]  E66.01 278.01    3.  Essential hypertension  I10 401.9 CBC W/O DIFF      METABOLIC PANEL, COMPREHENSIVE     Will notify when labs return    Pt informed to return to office with worsening of symptoms, or PRN with any questions or concerns. Pt verbalizes understanding of plan of care and denies further questions or concerns at this time.

## 2020-09-01 NOTE — PATIENT INSTRUCTIONS
Noninsulin Medicines for Type 2 Diabetes: Care Instructions Overview There are different types of noninsulin medicines for diabetes. Each works in a different way. But they all help you control your blood sugar. Some types help your body make insulin to lower your blood sugar. Others lower how much insulin your body needs. Some can slow how fast your body digests sugars. And some can remove extra glucose through your urine. You may need to take more than one medicine for diabetes. Two or more medicines may work better to lower your blood sugar level than just one does. · Metformin. This lowers how much glucose your liver makes. And it helps you respond better to insulin. It also lowers the amount of stored sugar that your liver releases when you are not eating. · Sulfonylureas. These help your body release more insulin. Some work for many hours. They can cause low blood sugar if you don't eat as you planned. An example is glipizide. · Thiazolidinediones. These reduce the amount of blood glucose. They also help you respond better to insulin. An example is pioglitazone. · SGLT2 inhibitors. These help to remove extra glucose through your urine. They may also help some people lose weight. An example is ertugliflozin. · DPP-4 inhibitors. These help your body raise the level of insulin after you eat. They also help your body make less of a hormone that raises blood sugar. An example is alogliptin. · Incretin hormones (GLP-1 receptor agonists). These help your body make a protein that can raise your insulin level and make you less hungry. They're given as shots or pills. An example is semaglutide. · Meglitinides. These help your body release insulin. They also help slow how your body digests sugars. So they can keep your blood sugar from rising too fast after you eat. · Alpha-glucosidase inhibitors. These keep starches from breaking down. This means that they lower the amount of glucose absorbed when you eat. They don't help your body make more insulin. So they will not cause low blood sugar unless you use them with other medicines for diabetes. Follow-up care is a key part of your treatment and safety. Be sure to make and go to all appointments, and call your doctor if you are having problems. It's also a good idea to know your test results and keep a list of the medicines you take. How can you care for yourself at home? · Eat a healthy diet. Get some exercise each day. This may help you to reduce how much medicine you need. · Do not take other prescription or over-the-counter medicines, vitamins, herbal products, or supplements without talking to your doctor first. Some medicines for type 2 diabetes can cause problems with other medicines or supplements. · Tell your doctor if you plan to get pregnant. Some of these drugs are not safe for pregnant women. · Be safe with medicines. Take your medicines exactly as prescribed. Meglitinides and sulfonylureas can cause your blood sugar to drop very low. Call your doctor if you think you are having a problem with your medicine. · Check your blood sugar often. You can use a glucose monitor. Keeping track can help you know how certain foods, activities, and medicines affect your blood sugar. And it can help you keep your blood sugar from getting so low that it's not safe. When should you call for help? [id number] anytime you think you may need emergency care. For example, call if: 
· You passed out (lost consciousness). · You are confused or cannot think clearly. · Your blood sugar is very high or very low. Watch closely for changes in your health, and be sure to contact your doctor if: 
· Your blood sugar stays outside the level your doctor set for you. · You have any problems. Where can you learn more? Go to http://laney-leidy.info/ Enter H153 in the search box to learn more about \"Noninsulin Medicines for Type 2 Diabetes: Care Instructions. \" Current as of: December 20, 2019               Content Version: 12.5 © 3991-1061 [employer]. Care instructions adapted under license by [employer] (which disclaims liability or warranty for this information). If you have questions about a medical condition or this instruction, always ask your healthcare professional. [place] [address] any warranty or liability for your use of this information.

## 2020-09-01 NOTE — PROGRESS NOTES
Identified pt with two pt identifiers(name and ). Chief Complaint   Patient presents with    Foot Pain     right outer foot painful - no known injury to the area - feels like growth in area of concern that has become larger    Labs     NPO since midnight        Health Maintenance Due   Topic    Shingrix Vaccine Age 50> (1 of 2)    MICROALBUMIN Q1     Flu Vaccine (1)       Wt Readings from Last 3 Encounters:   20 262 lb (118.8 kg)   20 255 lb (115.7 kg)   19 255 lb (115.7 kg)     Temp Readings from Last 3 Encounters:   20 98.2 °F (36.8 °C) (Oral)   20 99.2 °F (37.3 °C) (Oral)   19 98.4 °F (36.9 °C) (Oral)     BP Readings from Last 3 Encounters:   20 149/88   20 139/89   19 121/82     Pulse Readings from Last 3 Encounters:   20 88   20 99   19 92         Learning Assessment:  :     Learning Assessment 2017   PRIMARY LEARNER Patient   BARRIERS PRIMARY LEARNER NONE   CO-LEARNER CAREGIVER No   PRIMARY LANGUAGE ENGLISH   LEARNER PREFERENCE PRIMARY DEMONSTRATION     LISTENING   ANSWERED BY patient   RELATIONSHIP SELF       Depression Screening:  :     3 most recent PHQ Screens 2020   Little interest or pleasure in doing things Not at all   Feeling down, depressed, irritable, or hopeless Not at all   Total Score PHQ 2 0       Fall Risk Assessment:  :     Fall Risk Assessment, last 12 mths 3/12/2018   Able to walk? Yes   Fall in past 12 months? No       Abuse Screening:  :     Abuse Screening Questionnaire 2020 9/10/2019 3/12/2018   Do you ever feel afraid of your partner? N N N   Are you in a relationship with someone who physically or mentally threatens you? N N N   Is it safe for you to go home?  Y Y Y       Coordination of Care Questionnaire:  :     1) Have you been to an emergency room, urgent care clinic since your last visit? no   Hospitalized since your last visit? no             2) Have you seen or consulted any other health care providers outside of [address] [place] [address] since your last visit? no  (Include any pap smears or colon screenings in this section.)    3) Do you have an Advance Directive on file? no  Are you interested in receiving information about Advance Directives? no    Patient is accompanied by self. Reviewed record in preparation for visit and have obtained necessary documentation. Medication reconciliation up to date and corrected with patient at this time.

## 2020-09-02 LAB
ALBUMIN SERPL-MCNC: 4.3 G/DL (ref 3.8–4.9)
ALBUMIN/GLOB SERPL: 1.9 {RATIO} (ref 1.2–2.2)
ALP SERPL-CCNC: 57 IU/L (ref 39–117)
ALT SERPL-CCNC: 57 IU/L (ref 0–44)
AST SERPL-CCNC: 46 IU/L (ref 0–40)
BILIRUB SERPL-MCNC: 0.3 MG/DL (ref 0–1.2)
BUN SERPL-MCNC: 11 MG/DL (ref 6–24)
BUN/CREAT SERPL: 13 (ref 9–20)
CALCIUM SERPL-MCNC: 9 MG/DL (ref 8.7–10.2)
CHLORIDE SERPL-SCNC: 103 MMOL/L (ref 96–106)
CO2 SERPL-SCNC: 22 MMOL/L (ref 20–29)
CREAT SERPL-MCNC: 0.88 MG/DL (ref 0.76–1.27)
ERYTHROCYTE [DISTWIDTH] IN BLOOD BY AUTOMATED COUNT: 13.3 % (ref 11.6–15.4)
EST. AVERAGE GLUCOSE BLD GHB EST-MCNC: 163 MG/DL
GLOBULIN SER CALC-MCNC: 2.3 G/DL (ref 1.5–4.5)
GLUCOSE SERPL-MCNC: 128 MG/DL (ref 65–99)
HBA1C MFR BLD: 7.3 % (ref 4.8–5.6)
HCT VFR BLD AUTO: 49.2 % (ref 37.5–51)
HGB BLD-MCNC: 15.8 G/DL (ref 13–17.7)
MCH RBC QN AUTO: 29.9 PG (ref 26.6–33)
MCHC RBC AUTO-ENTMCNC: 32.1 G/DL (ref 31.5–35.7)
MCV RBC AUTO: 93 FL (ref 79–97)
PLATELET # BLD AUTO: 240 X10E3/UL (ref 150–450)
POTASSIUM SERPL-SCNC: 4.5 MMOL/L (ref 3.5–5.2)
PROT SERPL-MCNC: 6.6 G/DL (ref 6–8.5)
RBC # BLD AUTO: 5.28 X10E6/UL (ref 4.14–5.8)
SODIUM SERPL-SCNC: 140 MMOL/L (ref 134–144)
WBC # BLD AUTO: 8 X10E3/UL (ref 3.4–10.8)

## 2020-09-02 NOTE — PROGRESS NOTES
Patient verbalized understanding and has no further questions at this time.  I have mailed a copy to his address on file per his request.

## 2020-09-02 NOTE — PROGRESS NOTES
Please call patient and let him know that his labs returned. Mildly elevated liver enzymes which correlates with his hepatic steatosis.   Otherwise stable  Should continue to focus on diet and lifestyle changes, and should follow up in 6 months for office visit and fasting labs  Thanks

## 2020-09-05 DIAGNOSIS — J40 BRONCHITIS: ICD-10-CM

## 2020-09-08 ENCOUNTER — TELEPHONE (OUTPATIENT)
Dept: FAMILY MEDICINE CLINIC | Age: 60
End: 2020-09-08

## 2020-09-08 RX ORDER — ALBUTEROL SULFATE 90 UG/1
AEROSOL, METERED RESPIRATORY (INHALATION)
Qty: 6.7 INHALER | Refills: 0 | Status: SHIPPED | OUTPATIENT
Start: 2020-09-08 | End: 2020-09-28

## 2020-09-08 NOTE — TELEPHONE ENCOUNTER
Pt looking for lab results were mailed to him - pt also wants to know if a lipid panel was done    Call pt at [phone]

## 2020-09-08 NOTE — TELEPHONE ENCOUNTER
Returned call to pt and he was advised his last lipid panel was done on 05/07/20 so it was not repeated as it has not been 6 months since the last one. He was also advised that letter was mailed out on 09/02/2020 and should reach him this week. Patient verbalized understanding and reports medi weight loss was looking for more recent lipid panel but he understands why this was not redrawn now. I offered to leave a copy of the letter at the  for pt  and he declined stating that he had already printed his labs from his [address]

## 2020-09-11 RX ORDER — OMEPRAZOLE 20 MG/1
CAPSULE, DELAYED RELEASE ORAL
Qty: 90 CAP | Refills: 1 | Status: SHIPPED | OUTPATIENT
Start: 2020-09-11 | End: 2021-06-18

## 2020-09-28 DIAGNOSIS — J40 BRONCHITIS: ICD-10-CM

## 2020-09-28 RX ORDER — ALBUTEROL SULFATE 90 UG/1
AEROSOL, METERED RESPIRATORY (INHALATION)
Qty: 6.7 INHALER | Refills: 0 | Status: SHIPPED | OUTPATIENT
Start: 2020-09-28

## 2020-10-06 RX ORDER — METFORMIN HYDROCHLORIDE 1000 MG/1
TABLET ORAL
Qty: 180 TAB | Refills: 0 | Status: SHIPPED | OUTPATIENT
Start: 2020-10-06 | End: 2021-01-18

## 2020-10-08 DIAGNOSIS — E78.1 HYPERTRIGLYCERIDEMIA: ICD-10-CM

## 2020-10-08 RX ORDER — FENOFIBRATE 145 MG/1
TABLET, COATED ORAL
Qty: 90 TAB | Refills: 0 | Status: SHIPPED | OUTPATIENT
Start: 2020-10-08 | End: 2021-01-04

## 2020-10-08 RX ORDER — ROSUVASTATIN CALCIUM 10 MG/1
TABLET, COATED ORAL
Qty: 90 TAB | Refills: 0 | Status: SHIPPED | OUTPATIENT
Start: 2020-10-08 | End: 2021-01-04

## 2020-12-31 DIAGNOSIS — E78.1 HYPERTRIGLYCERIDEMIA: ICD-10-CM

## 2021-01-04 RX ORDER — FENOFIBRATE 145 MG/1
TABLET, COATED ORAL
Qty: 90 TAB | Refills: 0 | Status: SHIPPED | OUTPATIENT
Start: 2021-01-04

## 2021-01-04 RX ORDER — ROSUVASTATIN CALCIUM 10 MG/1
TABLET, COATED ORAL
Qty: 90 TAB | Refills: 0 | Status: SHIPPED | OUTPATIENT
Start: 2021-01-04 | End: 2021-09-16 | Stop reason: SDUPTHER

## 2021-01-12 ENCOUNTER — TELEPHONE (OUTPATIENT)
Dept: FAMILY MEDICINE CLINIC | Age: 61
End: 2021-01-12

## 2021-01-12 NOTE — TELEPHONE ENCOUNTER
----- Message from [facility] sent at 1/12/2021 12:25 PM EST -----  Regarding: CHANDANA-Светлана Carver/Telephone  General Message  Caller's first and last name:N/A  Reason for call:Pt would like to know his blood type.   Callback required yes/no and why:Yes  Best contact number(s):[phone]  Details to clarify the request:N/A

## 2021-01-18 RX ORDER — METFORMIN HYDROCHLORIDE 1000 MG/1
TABLET ORAL
Qty: 180 TAB | Refills: 0 | Status: SHIPPED | OUTPATIENT
Start: 2021-01-18 | End: 2021-04-29

## 2021-03-22 ENCOUNTER — HOSPITAL ENCOUNTER (EMERGENCY)
Age: 61
Discharge: HOME | End: 2021-03-22
Attending: STUDENT IN AN ORGANIZED HEALTH CARE EDUCATION/TRAINING PROGRAM
Payer: COMMERCIAL

## 2021-03-22 ENCOUNTER — APPOINTMENT (OUTPATIENT)
Dept: GENERAL RADIOLOGY | Age: 61
End: 2021-03-22
Attending: STUDENT IN AN ORGANIZED HEALTH CARE EDUCATION/TRAINING PROGRAM
Payer: COMMERCIAL

## 2021-03-22 ENCOUNTER — TELEPHONE (OUTPATIENT)
Dept: FAMILY MEDICINE CLINIC | Age: 61
End: 2021-03-22

## 2021-03-22 VITALS
RESPIRATION RATE: 12 BRPM | WEIGHT: 252.21 LBS | HEART RATE: 72 BPM | BODY MASS INDEX: 34.16 KG/M2 | OXYGEN SATURATION: 96 % | HEIGHT: 72 IN | SYSTOLIC BLOOD PRESSURE: 154 MMHG | TEMPERATURE: 97.4 F | DIASTOLIC BLOOD PRESSURE: 69 MMHG

## 2021-03-22 DIAGNOSIS — B02.9 HERPES ZOSTER WITHOUT COMPLICATION: ICD-10-CM

## 2021-03-22 DIAGNOSIS — R07.9 CHEST PAIN, UNSPECIFIED TYPE: Primary | ICD-10-CM

## 2021-03-22 LAB
ALBUMIN SERPL-MCNC: 4 G/DL (ref 3.5–5)
ALBUMIN/GLOB SERPL: 1.3 {RATIO} (ref 1.1–2.2)
ALP SERPL-CCNC: 50 U/L (ref 45–117)
ALT SERPL-CCNC: 44 U/L (ref 12–78)
ANION GAP SERPL CALC-SCNC: 10 MMOL/L (ref 5–15)
AST SERPL-CCNC: 23 U/L (ref 15–37)
BASOPHILS # BLD: 0.1 K/UL (ref 0–0.1)
BASOPHILS NFR BLD: 1 % (ref 0–1)
BILIRUB SERPL-MCNC: 0.5 MG/DL (ref 0.2–1)
BUN SERPL-MCNC: 17 MG/DL (ref 6–20)
BUN/CREAT SERPL: 14 (ref 12–20)
CALCIUM SERPL-MCNC: 9.8 MG/DL (ref 8.5–10.1)
CHLORIDE SERPL-SCNC: 105 MMOL/L (ref 97–108)
CO2 SERPL-SCNC: 28 MMOL/L (ref 21–32)
CREAT SERPL-MCNC: 1.24 MG/DL (ref 0.7–1.3)
DIFFERENTIAL METHOD BLD: NORMAL
EOSINOPHIL # BLD: 0.2 K/UL (ref 0–0.4)
EOSINOPHIL NFR BLD: 3 % (ref 0–7)
ERYTHROCYTE [DISTWIDTH] IN BLOOD BY AUTOMATED COUNT: 14 % (ref 11.5–14.5)
GLOBULIN SER CALC-MCNC: 3 G/DL (ref 2–4)
GLUCOSE SERPL-MCNC: 158 MG/DL (ref 65–100)
HCT VFR BLD AUTO: 45.7 % (ref 36.6–50.3)
HGB BLD-MCNC: 15.5 G/DL (ref 12.1–17)
IMM GRANULOCYTES # BLD AUTO: 0 K/UL (ref 0–0.04)
IMM GRANULOCYTES NFR BLD AUTO: 0 % (ref 0–0.5)
LYMPHOCYTES # BLD: 2.3 K/UL (ref 0.8–3.5)
LYMPHOCYTES NFR BLD: 32 % (ref 12–49)
MCH RBC QN AUTO: 30.7 PG (ref 26–34)
MCHC RBC AUTO-ENTMCNC: 33.9 G/DL (ref 30–36.5)
MCV RBC AUTO: 90.5 FL (ref 80–99)
MONOCYTES # BLD: 0.6 K/UL (ref 0–1)
MONOCYTES NFR BLD: 8 % (ref 5–13)
NEUTS SEG # BLD: 4 K/UL (ref 1.8–8)
NEUTS SEG NFR BLD: 56 % (ref 32–75)
NRBC # BLD: 0 K/UL (ref 0–0.01)
NRBC BLD-RTO: 0 PER 100 WBC
PLATELET # BLD AUTO: 248 K/UL (ref 150–400)
PMV BLD AUTO: 11.4 FL (ref 8.9–12.9)
POTASSIUM SERPL-SCNC: 3.8 MMOL/L (ref 3.5–5.1)
PROT SERPL-MCNC: 7 G/DL (ref 6.4–8.2)
RBC # BLD AUTO: 5.05 M/UL (ref 4.1–5.7)
SODIUM SERPL-SCNC: 143 MMOL/L (ref 136–145)
TROPONIN I SERPL-MCNC: <0.05 NG/ML
TROPONIN I SERPL-MCNC: <0.05 NG/ML
WBC # BLD AUTO: 7.2 K/UL (ref 4.1–11.1)

## 2021-03-22 PROCEDURE — 85025 COMPLETE CBC W/AUTO DIFF WBC: CPT

## 2021-03-22 PROCEDURE — 74011250637 HC RX REV CODE- 250/637: Performed by: STUDENT IN AN ORGANIZED HEALTH CARE EDUCATION/TRAINING PROGRAM

## 2021-03-22 PROCEDURE — 71046 X-RAY EXAM CHEST 2 VIEWS: CPT

## 2021-03-22 PROCEDURE — 36415 COLL VENOUS BLD VENIPUNCTURE: CPT

## 2021-03-22 PROCEDURE — 93005 ELECTROCARDIOGRAM TRACING: CPT

## 2021-03-22 PROCEDURE — 80053 COMPREHEN METABOLIC PANEL: CPT

## 2021-03-22 PROCEDURE — 99285 EMERGENCY DEPT VISIT HI MDM: CPT

## 2021-03-22 PROCEDURE — 84484 ASSAY OF TROPONIN QUANT: CPT

## 2021-03-22 PROCEDURE — 74011000250 HC RX REV CODE- 250: Performed by: STUDENT IN AN ORGANIZED HEALTH CARE EDUCATION/TRAINING PROGRAM

## 2021-03-22 RX ORDER — VALACYCLOVIR HYDROCHLORIDE 1 G/1
1000 TABLET, FILM COATED ORAL 3 TIMES DAILY
Qty: 21 TAB | Refills: 0 | Status: SHIPPED | OUTPATIENT
Start: 2021-03-22 | End: 2021-03-29

## 2021-03-22 RX ORDER — LIDOCAINE HYDROCHLORIDE 20 MG/ML
JELLY TOPICAL
Status: COMPLETED | OUTPATIENT
Start: 2021-03-22 | End: 2021-03-22

## 2021-03-22 RX ORDER — TRAMADOL HYDROCHLORIDE 50 MG/1
50 TABLET ORAL
Qty: 18 TAB | Refills: 0 | Status: SHIPPED | OUTPATIENT
Start: 2021-03-22 | End: 2021-03-25

## 2021-03-22 RX ORDER — GUAIFENESIN 100 MG/5ML
81 LIQUID (ML) ORAL DAILY
Qty: 30 TAB | Refills: 0 | Status: SHIPPED | OUTPATIENT
Start: 2021-03-23 | End: 2021-04-22

## 2021-03-22 RX ORDER — ASPIRIN 325 MG
325 TABLET ORAL
Status: COMPLETED | OUTPATIENT
Start: 2021-03-22 | End: 2021-03-22

## 2021-03-22 RX ADMIN — NITROGLYCERIN 0.5 INCH: 20 OINTMENT TOPICAL at 12:34

## 2021-03-22 RX ADMIN — ASPIRIN 325 MG: 325 TABLET ORAL at 12:27

## 2021-03-22 RX ADMIN — LIDOCAINE HYDROCHLORIDE: 20 JELLY TOPICAL at 15:11

## 2021-03-22 NOTE — TELEPHONE ENCOUNTER
Would you call patient and see if he would be willing to do this visit virtually? It would be an easy one to do virtually, and safe.   Thanks

## 2021-03-22 NOTE — ED NOTES
Patient ambulated to the bathroom with a steady gait, to void. Has recently been on the phone with an unknown person and has now decided not to be admitted. Dr Charles Canas is aware.  No change in chest discomfort

## 2021-03-22 NOTE — ED TRIAGE NOTES
Symptoms of shingles on Thursday and the rash appeared on Saturday, all along the left ribcage. Left sided chest pain, \"it feels like heartburn in my left chest area. \" \"I had a telehealth appointment with my doctor today, but I just wanted to be seen. \"

## 2021-03-22 NOTE — ED PROVIDER NOTES
Patient is a 62-year-old male history of hypertension, hyperlipidemia and diabetes presenting today with chest pain. Describes what he thinks is a \"heartburn\" for the past 4 days. It is worse with lying flat. Nothing particular makes it better or worse otherwise. It is a constant moderately severe pain that does not radiate. He does not have history of cardiac disease or chest pain in the past.  No associated shortness of breath, dizziness or diaphoresis. He notes a strong family cardiac disease history. He also reports that he thinks he has shingles to his left back. This is a painful rash that started 2 days ago. This pain is separate from his anterior chest pain. He has been taking BC powder with minimal relief. Past Medical History:   Diagnosis Date    Arthritis     Chronic pain     LOWER BACK , LEFT ARM    Deviated septum     Diabetes (HCC)     BORDERLINE PER PATIENT    GERD (gastroesophageal reflux disease)     High cholesterol     Hypertension     Unspecified sleep apnea 2014    NO C-PAP       Past Surgical History:   Procedure Laterality Date    HX APPENDECTOMY      HX GI      COLONOSCOPY    HX HEENT  2014    Tonsils and uvula removed.     HX OTHER SURGICAL      WISDOM TEETH EXTRACTION    HX OTHER SURGICAL      EXPLORATORY LAP    HX SEPTOPLASTY           Family History:   Problem Relation Age of Onset    Heart Disease Mother     Dementia Mother         Romilda Pilling Anesth Problems Maternal Aunt         TROUBLE BREATHING AFTER HIP SURGERY       Social History     Socioeconomic History    Marital status:      Spouse name: Not on file    Number of children: Not on file    Years of education: Not on file    Highest education level: Not on file   Occupational History    Not on file   Social Needs    Financial resource strain: Not on file    Food insecurity     Worry: Not on file     Inability: Not on file    Transportation needs     Medical: Not on file Non-medical: Not on file   Tobacco Use    Smoking status: Current Every Day Smoker     Packs/day: 1.50     Years: 21.00     Pack years: 31.50    Smokeless tobacco: Never Used   Substance and Sexual Activity    Alcohol use: Yes     Alcohol/week: 1.0 standard drinks     Types: 1 Shots of liquor per week    Drug use: No    Sexual activity: Yes     Partners: Female   Lifestyle    Physical activity     Days per week: Not on file     Minutes per session: Not on file    Stress: Not on file   Relationships    Social connections     Talks on phone: Not on file     Gets together: Not on file     Attends Oriental orthodox service: Not on file     Active member of club or organization: Not on file     Attends meetings of clubs or organizations: Not on file     Relationship status: Not on file    Intimate partner violence     Fear of current or ex partner: Not on file     Emotionally abused: Not on file     Physically abused: Not on file     Forced sexual activity: Not on file   Other Topics Concern    Not on file   Social History Narrative    Not on file         ALLERGIES: Vioxx [rofecoxib]    Review of Systems   Constitutional: Negative for chills and fever. HENT: Negative for congestion and sore throat. Eyes: Negative for pain and redness. Respiratory: Negative for cough and shortness of breath. Cardiovascular: Positive for chest pain. Negative for palpitations. Gastrointestinal: Negative for abdominal pain, diarrhea, nausea and vomiting. Genitourinary: Negative for frequency and hematuria. Musculoskeletal: Negative for back pain and neck pain. Skin: Positive for rash. Negative for wound. Neurological: Negative for dizziness and headaches. Hematological: Does not bruise/bleed easily.        Vitals:    03/22/21 1216 03/22/21 1234   BP: (!) 149/74 132/68   Pulse: 83 81   Resp: 15    Temp: 97.4 °F (36.3 °C)    SpO2: 97%    Weight: 114.4 kg (252 lb 3.3 oz)    Height: 6' (1.829 m)             Physical Exam  Vitals signs and nursing note reviewed. Constitutional:       General: He is not in acute distress. Appearance: He is well-developed. HENT:      Head: Normocephalic and atraumatic. Eyes:      Conjunctiva/sclera: Conjunctivae normal.      Pupils: Pupils are equal, round, and reactive to light. Neck:      Musculoskeletal: Normal range of motion and neck supple. Cardiovascular:      Rate and Rhythm: Normal rate and regular rhythm. Heart sounds: Normal heart sounds. No murmur. No friction rub. No gallop. Pulmonary:      Effort: Pulmonary effort is normal. No respiratory distress. Breath sounds: Normal breath sounds. No wheezing or rales. Abdominal:      General: Bowel sounds are normal. There is no distension. Palpations: Abdomen is soft. Tenderness: There is no abdominal tenderness. There is no guarding or rebound. Musculoskeletal: Normal range of motion. Skin:     General: Skin is warm and dry. Capillary Refill: Capillary refill takes less than 2 seconds. Comments: Erythematous rash in a curvilinear distribution to the left back/posterior ribs that has the appearance of shingles   Neurological:      Mental Status: He is alert and oriented to person, place, and time. Psychiatric:         Mood and Affect: Mood normal.         Behavior: Behavior normal.                EKG Interpretation:   ED Physician interpretation  NSR rate 92, normal axis, normal intervals, nonspecific STT wave changes without elevation or depression      Course:    Topical nitro given after my assessment in addition to ASA    1:20 PM re-evaluated. Pain seems to have improved with nitro. Perfect Serve Consult for Admission  1:20 PM    ED Room Number: WER15/15  Patient Name and age:  Efren Murillo. 64 y.o.  male  Working Diagnosis:   1.  Chest pain, unspecified type        COVID-19 Suspicion:  no  Sepsis present:  no  Reassessment needed: no  Code Status:  Full Code  Readmission: no  Isolation Requirements:  no  Recommended Level of Care:  telemetry  Department:[facility] ED - [phone]  Other:  64 y.o. no cardiac hx but here with chest pain, improved with nitro. EKG/trop ok. Many risk factors. ASA given. 2pm Patient now declining admission. Tells me he isn't sure if nitro actually worked or not but does have a HA from it. Also telling me the pain is intermittent and that he worked in the yard yesterday without worsening pain. Would rather go home and follow up outpatient. Cites concern for zoraida covid. Patient is of sound mind and knows risks of leaving including heart attack, permanent disability and even death and prefers to go home. I will check another set of enzymes, repeat EKG, discuss with cardiology. Pt expresses wish to leave the emergency department against medical advice. Adverse problems related to this decision including bodily harm and death have been discussed with the patient and/or family. The patient and/or family do not appear intoxicated and appear to be capable of understanding and making a decision of such gravity. The patient and/or family express understanding of the possible adverse outcomes of their decision and still express the wish to leave against medical advice. The patient and/or family were given follow up and return instructions and the available laboratory tests and x-rays were discussed. The patient and/or family were given the opportunity to ask questions. The patient and/or family agree to follow up with a physician of their choice as soon as possible or to return to the ER at any time to finish the work-up.      3:04 PM repeat EKG  NSR rate 73, normal axis, normal intervals, non-specific stt wave changes without elevation or depressions. MDM: Patient is a 28-year-old male multiple cardiac risk factors including diabetes, hypertension, hyperlipidemia and obesity presenting today secondary to chest discomfort.   It did get better with nitroglycerin topical.  EKG showed no acute ischemic changes. X-ray of the chest was negative. Troponin was negative and a repeat troponin was also negative. My initial plan was for admission to the family practice service, I had even consulted them, however prior to them putting in orders patient told me that he did not want to stay. He is concerned about catching Covid. I discussed with him multiple times the risks and benefits of hospitalization and he elected to leave [address] I was able to get 2 sets of cardiac enzymes and 2 EKGs both of which were reassuring. I discussed with cardiology who made an appointment with him tomorrow at 2 PM.  He was also noted to have what appears to be shingles to his left posterior rib cage therefore started him on Valtrex and tramadol. Strict return precautions provided and he was encouraged to return if he changes mind about admission or if he had any change in his symptoms. Clinical Impression:     ICD-10-CM ICD-9-CM    1. Chest pain, unspecified type  R07.9 786.50    2.  Herpes zoster without complication  Q47.3 709.3 traMADoL (Ultram) 50 mg tablet           Disposition: AMA

## 2021-03-23 ENCOUNTER — TELEPHONE (OUTPATIENT)
Dept: CARDIOLOGY CLINIC | Age: 61
End: 2021-03-23

## 2021-03-23 NOTE — TELEPHONE ENCOUNTER
Spoke with pt, I identified with name and birth date. Pt was seen in the ED yesterday and added to Dr Polanco Given schedule for today. However, the pt was Dx with Shingles. I called to inquire if he currently has a rash and/or open lesions. The pt confirmed he does have a rash, and is unsure about open lesions  As the rash is on is side and back, and it's hard for him to see. I let him know that we will have to cancel his appointment b/c he is considered highly contagious. Pt reluctantly expressed understanding. He asked what he should do about his chest pain, and I gave him guidelines as to when to go back to the ED. Te pt will call and reschedule once his infecton is gone. Pt has no further questions or concerns at this time.

## 2021-03-23 NOTE — TELEPHONE ENCOUNTER
Patient is calling stating that he is returning a call and needs to speak with you. Didn't give any details. He can be reached at [phone].

## 2021-03-24 DIAGNOSIS — E78.1 HYPERTRIGLYCERIDEMIA: ICD-10-CM

## 2021-03-24 LAB
ATRIAL RATE: 75 BPM
ATRIAL RATE: 82 BPM
CALCULATED P AXIS, ECG09: 33 DEGREES
CALCULATED P AXIS, ECG09: 41 DEGREES
CALCULATED R AXIS, ECG10: -19 DEGREES
CALCULATED R AXIS, ECG10: -8 DEGREES
CALCULATED T AXIS, ECG11: 70 DEGREES
CALCULATED T AXIS, ECG11: 77 DEGREES
DIAGNOSIS, 93000: NORMAL
DIAGNOSIS, 93000: NORMAL
P-R INTERVAL, ECG05: 184 MS
P-R INTERVAL, ECG05: 184 MS
Q-T INTERVAL, ECG07: 370 MS
Q-T INTERVAL, ECG07: 376 MS
QRS DURATION, ECG06: 92 MS
QRS DURATION, ECG06: 94 MS
QTC CALCULATION (BEZET), ECG08: 413 MS
QTC CALCULATION (BEZET), ECG08: 439 MS
VENTRICULAR RATE, ECG03: 75 BPM
VENTRICULAR RATE, ECG03: 82 BPM

## 2021-03-24 RX ORDER — ROSUVASTATIN CALCIUM 10 MG/1
TABLET, COATED ORAL
Qty: 90 TAB | Refills: 0 | OUTPATIENT
Start: 2021-03-24

## 2021-03-24 RX ORDER — FENOFIBRATE 145 MG/1
TABLET, COATED ORAL
Qty: 90 TAB | Refills: 0 | OUTPATIENT
Start: 2021-03-24

## 2021-03-24 NOTE — TELEPHONE ENCOUNTER
Called pt 3/24/21 at 10:14 am and pt stated had enough meds for now will call back later to schedule.

## 2021-03-24 NOTE — TELEPHONE ENCOUNTER
Pt is due for visit and fasting labs, would need to be virtual as in ER this week, or with another provider  Thanks

## 2021-03-30 ENCOUNTER — TELEPHONE (OUTPATIENT)
Dept: FAMILY MEDICINE CLINIC | Age: 61
End: 2021-03-30

## 2021-03-30 NOTE — TELEPHONE ENCOUNTER
I would personally advise that he wait until he has no symptoms of shingles, until he gets vaccinated.   Thanks

## 2021-03-30 NOTE — TELEPHONE ENCOUNTER
Pt has end state of shingles and wants to know if he can get the covid vaccination (started last Thursday)  Pt has covid vaccination set for tomorrow 3/31/21 please call asap and advise     Call pt at [phone]

## 2021-04-01 ENCOUNTER — VIRTUAL VISIT (OUTPATIENT)
Dept: FAMILY MEDICINE CLINIC | Age: 61
End: 2021-04-01
Payer: COMMERCIAL

## 2021-04-01 DIAGNOSIS — B02.9 HERPES ZOSTER WITHOUT COMPLICATION: Primary | ICD-10-CM

## 2021-04-01 PROCEDURE — 99213 OFFICE O/P EST LOW 20 MIN: CPT | Performed by: NURSE PRACTITIONER

## 2021-04-01 RX ORDER — GABAPENTIN 300 MG/1
CAPSULE ORAL
Qty: 21 CAP | Refills: 0 | Status: SHIPPED | OUTPATIENT
Start: 2021-04-01 | End: 2021-09-16 | Stop reason: ALTCHOICE

## 2021-04-01 NOTE — PROGRESS NOTES
HISTORY OF PRESENT ILLNESS  Lucille Clark is a 64 y.o. male. HPI  Pt presents with \"shingles\"  Visit was conducted via [employer]. me  Pt was located at home, provider was located at home  Visit lased 3 minutes  Lucille Clark, who was evaluated through a synchronous (real-time) audio-video encounter, and/or his healthcare decision maker, is aware that it is a billable service, with coverage as determined by his insurance carrier. He provided verbal consent to proceed: Yes, and patient identification was verified. This visit was conducted pursuant to the emergency declaration under the [facility], [address] [place] [address] authority and the [employer] General Act. A caregiver was present when appropriate. Ability to conduct physical exam was limited. The patient was located in a state where the provider was credentialed to provide care. --Micheline Cowden, NP on 4/1/2021 at 9:49 AM    Pt went to the [facility] ER on 3/22 and was diagnosed with chest pain and shingles. ER note reviewed. He was prescribed tramadol for the shingles  He states that he has taken all of the tramadol, and continues to have pain in the area of the shingles  He has tried OTC aleve with no relief  The rash has completley scabbed over at this time  He states that the rash is located on left upper abdomen, and radiates around the back  He is wanting something else for the shingles pain. Review of Systems   Constitutional: Negative for fever. Skin: Positive for rash. Physical Exam  Constitutional:       Appearance: Normal appearance. Skin:         Neurological:      Mental Status: He is alert. Psychiatric:         Behavior: Behavior normal.         ASSESSMENT and PLAN    ICD-10-CM ICD-9-CM    1.  Herpes zoster without complication  G88.8 545.4 gabapentin (NEURONTIN) 300 mg capsule     Educated about taking gabapentin as prescribed  Should notify office of any questions or concerns    Pt informed to return to office with worsening of symptoms, or PRN with any questions or concerns. Pt verbalizes understanding of plan of care and denies further questions or concerns at this time.

## 2021-04-29 RX ORDER — METFORMIN HYDROCHLORIDE 1000 MG/1
TABLET ORAL
Qty: 180 TAB | Refills: 0 | Status: SHIPPED | OUTPATIENT
Start: 2021-04-29 | End: 2021-08-01

## 2021-06-18 RX ORDER — OMEPRAZOLE 20 MG/1
CAPSULE, DELAYED RELEASE ORAL
Qty: 90 CAPSULE | Refills: 1 | Status: SHIPPED | OUTPATIENT
Start: 2021-06-18 | End: 2021-12-20

## 2021-08-01 RX ORDER — METFORMIN HYDROCHLORIDE 1000 MG/1
TABLET ORAL
Qty: 180 TABLET | Refills: 0 | Status: SHIPPED | OUTPATIENT
Start: 2021-08-01 | End: 2021-09-16 | Stop reason: SDUPTHER

## 2021-09-16 ENCOUNTER — OFFICE VISIT (OUTPATIENT)
Dept: FAMILY MEDICINE CLINIC | Age: 61
End: 2021-09-16
Payer: COMMERCIAL

## 2021-09-16 VITALS
SYSTOLIC BLOOD PRESSURE: 152 MMHG | HEART RATE: 94 BPM | TEMPERATURE: 97.2 F | RESPIRATION RATE: 18 BRPM | DIASTOLIC BLOOD PRESSURE: 88 MMHG | WEIGHT: 256 LBS | BODY MASS INDEX: 34.67 KG/M2 | HEIGHT: 72 IN | OXYGEN SATURATION: 94 %

## 2021-09-16 DIAGNOSIS — Z23 NEED FOR SHINGLES VACCINE: ICD-10-CM

## 2021-09-16 DIAGNOSIS — I10 ESSENTIAL HYPERTENSION: Primary | ICD-10-CM

## 2021-09-16 DIAGNOSIS — E11.9 CONTROLLED TYPE 2 DIABETES MELLITUS WITHOUT COMPLICATION, WITHOUT LONG-TERM CURRENT USE OF INSULIN (HCC): ICD-10-CM

## 2021-09-16 DIAGNOSIS — Z23 NEEDS FLU SHOT: ICD-10-CM

## 2021-09-16 DIAGNOSIS — E78.00 PURE HYPERCHOLESTEROLEMIA: ICD-10-CM

## 2021-09-16 PROCEDURE — 99214 OFFICE O/P EST MOD 30 MIN: CPT | Performed by: NURSE PRACTITIONER

## 2021-09-16 PROCEDURE — 90471 IMMUNIZATION ADMIN: CPT | Performed by: NURSE PRACTITIONER

## 2021-09-16 PROCEDURE — 90686 IIV4 VACC NO PRSV 0.5 ML IM: CPT | Performed by: NURSE PRACTITIONER

## 2021-09-16 RX ORDER — METFORMIN HYDROCHLORIDE 1000 MG/1
1000 TABLET ORAL 2 TIMES DAILY WITH MEALS
Qty: 180 TABLET | Refills: 1 | Status: SHIPPED | OUTPATIENT
Start: 2021-09-16

## 2021-09-16 RX ORDER — ZOSTER VACCINE RECOMBINANT, ADJUVANTED 50 MCG/0.5
0.5 KIT INTRAMUSCULAR ONCE
Qty: 0.5 ML | Refills: 0 | Status: SHIPPED | OUTPATIENT
Start: 2021-09-16 | End: 2021-09-16

## 2021-09-16 RX ORDER — ROSUVASTATIN CALCIUM 10 MG/1
10 TABLET, COATED ORAL DAILY
Qty: 90 TABLET | Refills: 1 | Status: SHIPPED | OUTPATIENT
Start: 2021-09-16 | End: 2021-09-20 | Stop reason: DRUGHIGH

## 2021-09-16 RX ORDER — VALSARTAN AND HYDROCHLOROTHIAZIDE 160; 25 MG/1; MG/1
1 TABLET ORAL DAILY
Qty: 90 TABLET | Refills: 1 | Status: SHIPPED | OUTPATIENT
Start: 2021-09-16

## 2021-09-16 RX ORDER — VALSARTAN AND HYDROCHLOROTHIAZIDE 160; 12.5 MG/1; MG/1
1 TABLET, FILM COATED ORAL DAILY
Qty: 90 TABLET | Refills: 1 | Status: CANCELLED | OUTPATIENT
Start: 2021-09-16

## 2021-09-16 NOTE — PROGRESS NOTES
Identified pt with two pt identifiers(name and ). Chief Complaint   Patient presents with    Hypertension    Labs     fasting        Health Maintenance Due   Topic    COVID-19 Vaccine (1)    Shingrix Vaccine Age 50> (1 of 2)    Low dose CT lung screening     Eye Exam Retinal or Dilated     Lipid Screen     Foot Exam Q1     Flu Vaccine (1)    A1C test (Diabetic or Prediabetic)     MICROALBUMIN Q1        Wt Readings from Last 3 Encounters:   21 256 lb (116.1 kg)   21 252 lb 3.3 oz (114.4 kg)   20 262 lb (118.8 kg)     Temp Readings from Last 3 Encounters:   21 97.2 °F (36.2 °C) (Temporal)   21 97.4 °F (36.3 °C)   20 98.2 °F (36.8 °C) (Oral)     BP Readings from Last 3 Encounters:   21 (!) 152/88   21 (!) 154/69   20 132/78     Pulse Readings from Last 3 Encounters:   21 94   21 72   20 88         Learning Assessment:  :     Learning Assessment 2017   PRIMARY LEARNER Patient   BARRIERS PRIMARY LEARNER NONE   CO-LEARNER CAREGIVER No   PRIMARY LANGUAGE ENGLISH   LEARNER PREFERENCE PRIMARY DEMONSTRATION     LISTENING   ANSWERED BY patient   RELATIONSHIP SELF       Depression Screening:  :     3 most recent PHQ Screens 2021   Little interest or pleasure in doing things Not at all   Feeling down, depressed, irritable, or hopeless Not at all   Total Score PHQ 2 0       Fall Risk Assessment:  :     Fall Risk Assessment, last 12 mths 3/12/2018   Able to walk? Yes   Fall in past 12 months? No       Abuse Screening:  :     Abuse Screening Questionnaire 2021 2020 9/10/2019 3/12/2018   Do you ever feel afraid of your partner? N N N N   Are you in a relationship with someone who physically or mentally threatens you? N N N N   Is it safe for you to go home?  Y Y Y Y       Coordination of Care Questionnaire:  :     1) Have you been to an emergency room, urgent care clinic since your last visit? no   Hospitalized since your last visit? no             2) Have you seen or consulted any other health care providers outside of [address] [place] [address] since your last visit? no  (Include any pap smears or colon screenings in this section.)    3) Do you have an Advance Directive on file? no  Are you interested in receiving information about Advance Directives?  no      Reviewed record in preparation for visit and have obtained necessary documentation

## 2021-09-16 NOTE — PATIENT INSTRUCTIONS
Vaccine Information Statement    Influenza (Flu) Vaccine (Inactivated or Recombinant): What You Need to Know    Many vaccine information statements are available in Telugu and other languages. See www.immunize.org/vis. Hojas de información sobre vacunas están disponibles en español y en muchos otros idiomas. Visite www.immunize.org/vis. 1. Why get vaccinated? Influenza vaccine can prevent influenza (flu). Flu is a contagious disease that spreads around the [facility] every year, usually between October and May. Anyone can get the flu, but it is more dangerous for some people. Infants and young children, people 72 years and older, pregnant people, and people with certain health conditions or a weakened immune system are at greatest risk of flu complications. Pneumonia, bronchitis, sinus infections, and ear infections are examples of flu-related complications. If you have a medical condition, such as heart disease, cancer, or diabetes, flu can make it worse. Flu can cause fever and chills, sore throat, muscle aches, fatigue, cough, headache, and runny or stuffy nose. Some people may have vomiting and diarrhea, though this is more common in children than adults. In an average year, thousands of people in the [facility] die from flu, and many more are hospitalized. Flu vaccine prevents millions of illnesses and flu-related visits to the doctor each year. 2. Influenza vaccines     CDC recommends everyone 6 months and older get vaccinated every flu season. Children 6 months through 6years of age may need 2 doses during a single flu season. Everyone else needs only 1 dose each flu season. It takes about 2 weeks for protection to develop after vaccination. There are many flu viruses, and they are always changing. Each year a new flu vaccine is made to protect against the influenza viruses believed to be likely to cause disease in the upcoming flu season.  Even when the vaccine doesnt exactly match these viruses, it may still provide some protection. Influenza vaccine does not cause flu. Influenza vaccine may be given at the same time as other vaccines. 3. Talk with your health care provider    Tell your vaccination provider if the person getting the vaccine:   Has had an allergic reaction after a previous dose of influenza vaccine, or has any severe, life-threatening allergies    Has ever had Guillain-Barré Syndrome (also called GBS)    In some cases, your health care provider may decide to postpone influenza vaccination until a future visit. Influenza vaccine can be administered at any time during pregnancy. People who are or will be pregnant during influenza season should receive inactivated influenza vaccine. People with minor illnesses, such as a cold, may be vaccinated. People who are moderately or severely ill should usually wait until they recover before getting influenza vaccine. Your health care provider can give you more information. 4. Risks of a vaccine reaction     Soreness, redness, and swelling where the shot is given, fever, muscle aches, and headache can happen after influenza vaccination.  There may be a very small increased risk of Guillain-Barré Syndrome (GBS) after inactivated influenza vaccine (the flu shot). [employer] children who get the flu shot along with pneumococcal vaccine (PCV13) and/or DTaP vaccine at the same time might be slightly more likely to have a seizure caused by fever. Tell your health care provider if a child who is getting flu vaccine has ever had a seizure. People sometimes faint after medical procedures, including vaccination. Tell your provider if you feel dizzy or have vision changes or ringing in the ears. As with any medicine, there is a very remote chance of a vaccine causing a severe allergic reaction, other serious injury, or death. 5. What if there is a serious problem?     An allergic reaction could occur after the vaccinated person leaves the clinic. If you see signs of a severe allergic reaction (hives, swelling of the face and throat, difficulty breathing, a fast heartbeat, dizziness, or weakness), call 9-1-1 and get the person to the nearest hospital.    For other signs that concern you, call your health care provider. Adverse reactions should be reported to the Vaccine Adverse Event Reporting System (VAERS). Your health care provider will usually file this report, or you can do it yourself. Visit the VAERS website at www.vaers. [facility].gov or call 0-221.834.4708. VAERS is only for reporting reactions, and VAERS staff members do not give medical advice. 6. The National Vaccine Injury Compensation Program    The [facility] Vaccine Injury Compensation Program (VICP) is a federal program that was created to compensate people who may have been injured by certain vaccines. Claims regarding alleged injury or death due to vaccination have a time limit for filing, which may be as short as two years. Visit the VICP website at www.[facility].gov/vaccinecompensation or call 2-746.378.3391 to learn about the program and about filing a claim. 7. How can I learn more?  Ask your health care provider.  Call your local or state health department.  Visit the website of the Food and Drug Administration (FDA) for vaccine package inserts and additional information at www.fda.gov/vaccines-blood-biologics/vaccines.  Contact the Centers for Disease Control and Prevention (CDC):  - Call 1-820.522.3698 (1-800-CDC-INFO) or  - Visit CDCs influenza website at www.cdc.gov/flu. Vaccine Information Statement   Inactivated Influenza Vaccine   8/6/2021  42 ADALID Ramos Leader [id number]   Department of Health and Human Services  Centers for Disease Control and Prevention    Office Use Only

## 2021-09-16 NOTE — PROGRESS NOTES
Subjective:     Jeremy Rome is a 63 y.o. male who presents for follow up of hypertension and hyperlipidemia. Diet and Lifestyle: generally follows a low fat low cholesterol diet  Home BP Monitoring: is not measured at home    Cardiovascular ROS: taking medications as instructed, no medication side effects noted, no TIA's, no chest pain on exertion, no dyspnea on exertion, no swelling of ankles. New concerns: Pt is here for follow up and fasting labs. BP is noted to be elevated and he states that he has been taking medications as prescribed. .     Patient Active Problem List    Diagnosis Date Noted    Severe obesity ([facility] [address] 09/01/2020    Essential hypertension 03/12/2018    Hypertriglyceridemia 10/16/2017    HNP (herniated nucleus pulposus), cervical 08/28/2017    Neck pain 08/21/2017    Hepatic steatosis 03/02/2017    Hyperlipidemia 03/02/2017    Fatigue 03/02/2017    Diabetes mellitus type 2, controlled ([facility] [address] 03/02/2017    Insomnia 01/18/2017    Abnormal chest x-ray 01/18/2017    Obstructive sleep apnea 05/09/2013     Current Outpatient Medications   Medication Sig Dispense Refill    rosuvastatin (CRESTOR) 10 mg tablet Take 1 Tablet by mouth daily. 90 Tablet 1    metFORMIN (GLUCOPHAGE) 1,000 mg tablet Take 1 Tablet by mouth two (2) times daily (with meals). 180 Tablet 1    varicella-zoster recombinant, PF, (Shingrix, PF,) 50 mcg/0.5 mL susr injection 0.5 mL by IntraMUSCular route once for 1 dose. 0.5 mL 0    valsartan-hydroCHLOROthiazide (DIOVAN-HCT) 160-25 mg per tablet Take 1 Tablet by mouth daily.  90 Tablet 1    omeprazole (PRILOSEC) 20 mg capsule TAKE 1 CAPSULE BY MOUTH EVERY DAY 90 Capsule 1    fenofibrate nanocrystallized (TRICOR) 145 mg tablet TAKE 1 TABLET DAILY 90 Tab 0    albuterol (PROVENTIL HFA, VENTOLIN HFA, PROAIR HFA) 90 mcg/actuation inhaler TAKE 2 PUFFS BY MOUTH EVERY 4 HOURS AS NEEDED FOR WHEEZE 6.7 Inhaler 0    glucose blood VI test strips (ASCENSIA AUTODISC VI, ONE TOUCH ULTRA TEST VI) strip Dispense One Touch Ultra test strips. To check blood sugar once daily. Dx: E11.9 100 Strip 2    Lancets misc Dispense Delica lancets. To test blood sugar once daily. Dx: E11.9 1 Each 11     Allergies   Allergen Reactions    Vioxx [Rofecoxib] Swelling and Other (comments)     SWELLING OF UVULA, DIFFICULTY BREATHING     Past Medical History:   Diagnosis Date    Arthritis     Chronic pain     LOWER BACK , LEFT ARM    Deviated septum     Diabetes (HCC)     BORDERLINE PER PATIENT    GERD (gastroesophageal reflux disease)     High cholesterol     Hypertension     Unspecified sleep apnea 2014    NO C-PAP     Past Surgical History:   Procedure Laterality Date    HX APPENDECTOMY      HX GI      COLONOSCOPY    HX HEENT  2014    Tonsils and uvula removed.  HX OTHER SURGICAL      WISDOM TEETH EXTRACTION    HX OTHER SURGICAL      EXPLORATORY LAP    HX SEPTOPLASTY       Family History   Problem Relation Age of Onset    Heart Disease Mother     Dementia Mother         ALZHEIMER'S    Anesth Problems Maternal Aunt         TROUBLE BREATHING AFTER HIP SURGERY     Social History     Tobacco Use    Smoking status: Current Every Day Smoker     Packs/day: 1.50     Years: 21.00     Pack years: 31.50    Smokeless tobacco: Never Used   Substance Use Topics    Alcohol use:  Yes     Alcohol/week: 1.0 standard drinks     Types: 1 Shots of liquor per week        Lab Results   Component Value Date/Time    WBC 7.2 03/22/2021 12:26 PM    HGB 15.5 03/22/2021 12:26 PM    HCT 45.7 03/22/2021 12:26 PM    PLATELET 962 26/87/4035 12:26 PM    MCV 90.5 03/22/2021 12:26 PM     Lab Results   Component Value Date/Time    Hemoglobin A1c 7.3 (H) 09/01/2020 12:00 AM    Hemoglobin A1c 7.3 (H) 05/07/2020 03:49 AM    Hemoglobin A1c 6.2 (H) 10/07/2019 10:05 AM    Glucose 158 (H) 03/22/2021 12:26 PM    Glucose (POC) 240 (H) 08/29/2017 11:49 AM    LDL, calculated 92 05/07/2020 03:49 AM    Creatinine 1.24 03/22/2021 12:26 PM      Lab Results   Component Value Date/Time    Cholesterol, total 166 05/07/2020 03:49 AM    HDL Cholesterol 32 (L) 05/07/2020 03:49 AM    LDL, calculated 92 05/07/2020 03:49 AM    Triglyceride 210 05/07/2020 03:49 AM     Lab Results   Component Value Date/Time    GFR est non-AA 60 (L) 03/22/2021 12:26 PM    GFR est AA >60 03/22/2021 12:26 PM    Creatinine 1.24 03/22/2021 12:26 PM    BUN 17 03/22/2021 12:26 PM    Sodium 143 03/22/2021 12:26 PM    Potassium 3.8 03/22/2021 12:26 PM    Chloride 105 03/22/2021 12:26 PM    CO2 28 03/22/2021 12:26 PM        Review of Systems, additional:  Pertinent items are noted in HPI. Objective:     Visit Vitals  BP (!) 152/88 (BP 1 Location: Right arm, BP Patient Position: Sitting, BP Cuff Size: Adult)   Pulse 94   Temp 97.2 °F (36.2 °C) (Temporal)   Resp 18   Ht 6' (1.829 m)   Wt 256 lb (116.1 kg)   SpO2 94%   BMI 34.72 kg/m²     Appearance: alert, well appearing, and in no distress. General exam: CVS exam BP noted to be moderately elevated today in office, S1, S2 normal, no gallop, no murmur, chest clear, no JVD, no HSM, no edema. Lab review: orders written for new lab studies as appropriate; see orders. Assessment/Plan:     hypertension poorly controlled. orders and follow up as documented in patient record. ICD-10-CM ICD-9-CM    1. Essential hypertension  I10 401.9 CBC W/O DIFF      METABOLIC PANEL, COMPREHENSIVE      CBC W/O DIFF      METABOLIC PANEL, COMPREHENSIVE   2. Controlled type 2 diabetes mellitus without complication, without long-term current use of insulin (HCC)  E11.9 250.00 metFORMIN (GLUCOPHAGE) 1,000 mg tablet      HEMOGLOBIN A1C WITH EAG      MICROALBUMIN, UR, RAND W/ MICROALB/CREAT RATIO      HEMOGLOBIN A1C WITH EAG      MICROALBUMIN, UR, RAND W/ MICROALB/CREAT RATIO   3.  Pure hypercholesterolemia  E78.00 272.0 rosuvastatin (CRESTOR) 10 mg tablet      LIPID PANEL      LIPID PANEL   4. Needs flu shot  Z23 V04.81 INFLUENZA VIRUS VAC QUAD,SPLIT,PRESV FREE SYRINGE IM   5. Need for shingles vaccine  Z23 V04.89 varicella-zoster recombinant, PF, (Shingrix, PF,) 50 mcg/0.5 mL susr injection     Educated about taking increased dose of BP medication  Will notify when labs return, and inform him of any change  In plan of care at that time    Vaccine and VIS given    Pt informed to return to office with worsening of symptoms, or PRN with any questions or concerns. Pt verbalizes understanding of plan of care and denies further questions or concerns at this time.

## 2021-09-17 LAB
ALBUMIN SERPL-MCNC: 5 G/DL (ref 3.8–4.9)
ALBUMIN/CREAT UR: 17 MG/G CREAT (ref 0–29)
ALBUMIN/GLOB SERPL: 2.3 {RATIO} (ref 1.2–2.2)
ALP SERPL-CCNC: 55 IU/L (ref 44–121)
ALT SERPL-CCNC: 57 IU/L (ref 0–44)
AST SERPL-CCNC: 34 IU/L (ref 0–40)
BILIRUB SERPL-MCNC: 0.6 MG/DL (ref 0–1.2)
BUN SERPL-MCNC: 13 MG/DL (ref 6–24)
BUN/CREAT SERPL: 13 (ref 9–20)
CALCIUM SERPL-MCNC: 9.9 MG/DL (ref 8.7–10.2)
CHLORIDE SERPL-SCNC: 103 MMOL/L (ref 96–106)
CHOLEST SERPL-MCNC: 241 MG/DL (ref 100–199)
CO2 SERPL-SCNC: 22 MMOL/L (ref 20–29)
CREAT SERPL-MCNC: 1 MG/DL (ref 0.76–1.27)
CREAT UR-MCNC: 214.8 MG/DL
ERYTHROCYTE [DISTWIDTH] IN BLOOD BY AUTOMATED COUNT: 13.1 % (ref 11.6–15.4)
EST. AVERAGE GLUCOSE BLD GHB EST-MCNC: 148 MG/DL
GLOBULIN SER CALC-MCNC: 2.2 G/DL (ref 1.5–4.5)
GLUCOSE SERPL-MCNC: 132 MG/DL (ref 65–99)
HBA1C MFR BLD: 6.8 % (ref 4.8–5.6)
HCT VFR BLD AUTO: 49 % (ref 37.5–51)
HDLC SERPL-MCNC: 34 MG/DL
HGB BLD-MCNC: 16.6 G/DL (ref 13–17.7)
IMP & REVIEW OF LAB RESULTS: NORMAL
LDLC SERPL CALC-MCNC: 160 MG/DL (ref 0–99)
MCH RBC QN AUTO: 30.2 PG (ref 26.6–33)
MCHC RBC AUTO-ENTMCNC: 33.9 G/DL (ref 31.5–35.7)
MCV RBC AUTO: 89 FL (ref 79–97)
MICROALBUMIN UR-MCNC: 36.1 UG/ML
PLATELET # BLD AUTO: 255 X10E3/UL (ref 150–450)
POTASSIUM SERPL-SCNC: 4.5 MMOL/L (ref 3.5–5.2)
PROT SERPL-MCNC: 7.2 G/DL (ref 6–8.5)
RBC # BLD AUTO: 5.5 X10E6/UL (ref 4.14–5.8)
SODIUM SERPL-SCNC: 138 MMOL/L (ref 134–144)
TRIGL SERPL-MCNC: 249 MG/DL (ref 0–149)
VLDLC SERPL CALC-MCNC: 47 MG/DL (ref 5–40)
WBC # BLD AUTO: 9.4 X10E3/UL (ref 3.4–10.8)

## 2021-09-20 ENCOUNTER — TELEPHONE (OUTPATIENT)
Dept: FAMILY MEDICINE CLINIC | Age: 61
End: 2021-09-20

## 2021-09-20 RX ORDER — ROSUVASTATIN CALCIUM 20 MG/1
20 TABLET, COATED ORAL
Qty: 90 TABLET | Refills: 1 | Status: SHIPPED | OUTPATIENT
Start: 2021-09-20

## 2021-09-20 NOTE — TELEPHONE ENCOUNTER
----- Message from Mervin Boyer NP sent at 9/20/2021  7:44 AM EDT -----  Please call patient and let him know that his labs returned:  1. Cholesterol is HIGH! I have increased dose of Crestor and he should take this in addition to his tricor.   Must focus on diet changes  Should return for office visit and fasting labs in 6 months  Thanks

## 2021-09-20 NOTE — PROGRESS NOTES
Please call patient and let him know that his labs returned:  1. Cholesterol is HIGH! I have increased dose of Crestor and he should take this in addition to his tricor.   Must focus on diet changes  Should return for office visit and fasting labs in 6 months  Thanks

## 2021-10-12 DIAGNOSIS — Z13.21 ENCOUNTER FOR VITAMIN DEFICIENCY SCREENING: Primary | ICD-10-CM

## 2021-12-20 RX ORDER — OMEPRAZOLE 20 MG/1
CAPSULE, DELAYED RELEASE ORAL
Qty: 90 CAPSULE | Refills: 1 | Status: SHIPPED | OUTPATIENT
Start: 2021-12-20

## 2021-12-21 ENCOUNTER — APPOINTMENT (OUTPATIENT)
Dept: FAMILY MEDICINE CLINIC | Age: 61
End: 2021-12-21

## 2021-12-22 ENCOUNTER — TELEPHONE (OUTPATIENT)
Dept: FAMILY MEDICINE CLINIC | Age: 61
End: 2021-12-22

## 2021-12-22 LAB — 25(OH)D3+25(OH)D2 SERPL-MCNC: 10 NG/ML (ref 30–100)

## 2021-12-22 RX ORDER — ERGOCALCIFEROL 1.25 MG/1
50000 CAPSULE ORAL
Qty: 12 CAPSULE | Refills: 1 | Status: SHIPPED | OUTPATIENT
Start: 2021-12-22

## 2021-12-22 NOTE — PROGRESS NOTES
Please call patient and let him know that his Vit D is very low.   I have sen in rx for him to take, will re-check in 6 months  Thanks

## 2021-12-22 NOTE — TELEPHONE ENCOUNTER
----- Message from Peace Fuchs NP sent at 12/22/2021  8:53 AM EST -----  Please call patient and let him know that his Vit D is very low.   I have sen in rx for him to take, will re-check in 6 months  Thanks

## 2021-12-23 ENCOUNTER — TELEPHONE (OUTPATIENT)
Dept: FAMILY MEDICINE CLINIC | Age: 61
End: 2021-12-23

## 2021-12-23 NOTE — TELEPHONE ENCOUNTER
Patient called and said the pharmacy did not have a script for him. I verified pharmacy and told him we sent it and we would resend it.

## 2022-02-08 ENCOUNTER — APPOINTMENT (OUTPATIENT)
Dept: GENERAL RADIOLOGY | Age: 62
End: 2022-02-08
Attending: STUDENT IN AN ORGANIZED HEALTH CARE EDUCATION/TRAINING PROGRAM
Payer: COMMERCIAL

## 2022-02-08 ENCOUNTER — HOSPITAL ENCOUNTER (EMERGENCY)
Age: 62
Discharge: HOME | End: 2022-02-08
Attending: STUDENT IN AN ORGANIZED HEALTH CARE EDUCATION/TRAINING PROGRAM
Payer: COMMERCIAL

## 2022-02-08 VITALS
SYSTOLIC BLOOD PRESSURE: 165 MMHG | OXYGEN SATURATION: 96 % | HEIGHT: 72 IN | WEIGHT: 267.42 LBS | TEMPERATURE: 98.2 F | BODY MASS INDEX: 36.22 KG/M2 | RESPIRATION RATE: 16 BRPM | HEART RATE: 92 BPM | DIASTOLIC BLOOD PRESSURE: 81 MMHG

## 2022-02-08 DIAGNOSIS — B34.9 VIRAL INFECTION: Primary | ICD-10-CM

## 2022-02-08 DIAGNOSIS — R06.02 SOB (SHORTNESS OF BREATH): ICD-10-CM

## 2022-02-08 DIAGNOSIS — R19.7 DIARRHEA OF PRESUMED INFECTIOUS ORIGIN: ICD-10-CM

## 2022-02-08 LAB
ANION GAP SERPL CALC-SCNC: 10 MMOL/L (ref 5–15)
BASOPHILS # BLD: 0.1 K/UL (ref 0–0.1)
BASOPHILS NFR BLD: 1 % (ref 0–1)
BUN SERPL-MCNC: 10 MG/DL (ref 6–20)
BUN/CREAT SERPL: 10 (ref 12–20)
CALCIUM SERPL-MCNC: 9.2 MG/DL (ref 8.5–10.1)
CHLORIDE SERPL-SCNC: 100 MMOL/L (ref 97–108)
CO2 SERPL-SCNC: 25 MMOL/L (ref 21–32)
CREAT SERPL-MCNC: 1.02 MG/DL (ref 0.7–1.3)
DIFFERENTIAL METHOD BLD: ABNORMAL
EOSINOPHIL # BLD: 0.1 K/UL (ref 0–0.4)
EOSINOPHIL NFR BLD: 1 % (ref 0–7)
ERYTHROCYTE [DISTWIDTH] IN BLOOD BY AUTOMATED COUNT: 13.3 % (ref 11.5–14.5)
GLUCOSE SERPL-MCNC: 194 MG/DL (ref 65–100)
HCT VFR BLD AUTO: 44.9 % (ref 36.6–50.3)
HGB BLD-MCNC: 15.1 G/DL (ref 12.1–17)
IMM GRANULOCYTES # BLD AUTO: 0.1 K/UL (ref 0–0.04)
IMM GRANULOCYTES NFR BLD AUTO: 1 % (ref 0–0.5)
LYMPHOCYTES # BLD: 2.2 K/UL (ref 0.8–3.5)
LYMPHOCYTES NFR BLD: 22 % (ref 12–49)
MCH RBC QN AUTO: 30.7 PG (ref 26–34)
MCHC RBC AUTO-ENTMCNC: 33.6 G/DL (ref 30–36.5)
MCV RBC AUTO: 91.3 FL (ref 80–99)
MONOCYTES # BLD: 0.9 K/UL (ref 0–1)
MONOCYTES NFR BLD: 9 % (ref 5–13)
NEUTS SEG # BLD: 6.8 K/UL (ref 1.8–8)
NEUTS SEG NFR BLD: 67 % (ref 32–75)
NRBC # BLD: 0 K/UL (ref 0–0.01)
NRBC BLD-RTO: 0 PER 100 WBC
PLATELET # BLD AUTO: 231 K/UL (ref 150–400)
PMV BLD AUTO: 11 FL (ref 8.9–12.9)
POTASSIUM SERPL-SCNC: 3.8 MMOL/L (ref 3.5–5.1)
RBC # BLD AUTO: 4.92 M/UL (ref 4.1–5.7)
SARS-COV-2, COV2: NORMAL
SARS-COV-2, XPLCVT: NOT DETECTED
SODIUM SERPL-SCNC: 135 MMOL/L (ref 136–145)
SOURCE, COVRS: NORMAL
WBC # BLD AUTO: 10.2 K/UL (ref 4.1–11.1)

## 2022-02-08 PROCEDURE — 71045 X-RAY EXAM CHEST 1 VIEW: CPT

## 2022-02-08 PROCEDURE — 74011250636 HC RX REV CODE- 250/636: Performed by: STUDENT IN AN ORGANIZED HEALTH CARE EDUCATION/TRAINING PROGRAM

## 2022-02-08 PROCEDURE — 85025 COMPLETE CBC W/AUTO DIFF WBC: CPT

## 2022-02-08 PROCEDURE — 96360 HYDRATION IV INFUSION INIT: CPT

## 2022-02-08 PROCEDURE — 99282 EMERGENCY DEPT VISIT SF MDM: CPT

## 2022-02-08 PROCEDURE — 36415 COLL VENOUS BLD VENIPUNCTURE: CPT

## 2022-02-08 PROCEDURE — U0005 INFEC AGEN DETEC AMPLI PROBE: HCPCS

## 2022-02-08 PROCEDURE — 80048 BASIC METABOLIC PNL TOTAL CA: CPT

## 2022-02-08 RX ORDER — SODIUM CHLORIDE 9 MG/ML
1000 INJECTION, SOLUTION INTRAVENOUS ONCE
Status: COMPLETED | OUTPATIENT
Start: 2022-02-08 | End: 2022-02-08

## 2022-02-08 RX ORDER — ACETAMINOPHEN AND CHLORPHENIRAMINE MALEATE 325; 2 MG/1; MG/1
2 TABLET, FILM COATED ORAL
Qty: 40 TABLET | Refills: 0 | Status: SHIPPED | OUTPATIENT
Start: 2022-02-08 | End: 2022-02-13

## 2022-02-08 RX ADMIN — SODIUM CHLORIDE 1000 ML: 900 INJECTION, SOLUTION INTRAVENOUS at 08:01

## 2022-02-08 NOTE — ED NOTES
RN d/c'd pt home per MD orders. RN provided d/c instructions and pt verbalized understanding with no further questions. Pt Aox4 and resting comfortably on RA. VSS and PIV removed.  Pt ambulated out of the ED in stable condition with all personal belongings

## 2022-02-08 NOTE — DISCHARGE INSTRUCTIONS
PLEASE DISCONTINUE THE USE OF AFRIN      THERE IS CONCERN THAT YOU HAVE THE COVID 19 VIRUS  PLEASE QUARANTINE/ISOLATE YOURSELF A TOTAL OF 10 DAYS FROM ONSET OF SYMPTOMS AS LONG AS SYMPTOMS HAVE IMPROVED AND YOU HAVE NOT HAD FEVER FOR 24 HOURS. RETURN IF DIFFICULTY BREATHING, CHEST PAIN, COUGHING UP BLOOD, OR INABILITY TO KEEP LIQUIDS DOWN. PLEASE USE A PULSE OXIMETER AT HOME. IF YOUR OXYGEN DROPS BELOW 90% FOR MORE THAN 60 SECONDS YOU WILL NEED TO RETURN.

## 2022-02-08 NOTE — ED TRIAGE NOTES
Patient has been using Afrin for over a week for nasal congestion, and now feels too stopped up to get a good breath. Diarrhea intermittently for one week, has only taken a pill or two twice in a week. Is able to eat and drink without difficulty. Still smoking.  Has not contacted his PMD recently and may be changing doctor's soon

## 2022-02-09 NOTE — ED PROVIDER NOTES
The patient is a 51-year-old male presenting today with congestion and diarrhea. Patient reports symptoms x1 week. Using Afrin but his congestion is getting worse. He says he is having shortness of breath, it seems to be more related to upper airway blockage rather than difficulty taking a deep breath. He has had a slight cough which is nonproductive. Has had diarrhea once or twice a day without fever. Took Imodium which helped transiently. Denies abdominal pain or vomiting. No fevers. No chest pain. No other complaints noted. He has received three Covid vaccines. Past Medical History:   Diagnosis Date    Arthritis     Chronic pain     LOWER BACK , LEFT ARM    Deviated septum     Diabetes (HCC)     BORDERLINE PER PATIENT    GERD (gastroesophageal reflux disease)     High cholesterol     Hypertension     Unspecified sleep apnea 2014    NO C-PAP       Past Surgical History:   Procedure Laterality Date    HX APPENDECTOMY      HX GI      COLONOSCOPY    HX HEENT  2014    Tonsils and uvula removed.  HX OTHER SURGICAL      WISDOM TEETH EXTRACTION    HX OTHER SURGICAL      EXPLORATORY LAP    HX SEPTOPLASTY           Family History:   Problem Relation Age of Onset    Heart Disease Mother     Dementia Mother         Mona Swartz Anesth Problems Maternal Aunt         TROUBLE BREATHING AFTER HIP SURGERY       Social History     Socioeconomic History    Marital status:      Spouse name: Not on file    Number of children: Not on file    Years of education: Not on file    Highest education level: Not on file   Occupational History    Not on file   Tobacco Use    Smoking status: Current Every Day Smoker     Packs/day: 1.50     Years: 21.00     Pack years: 31.50    Smokeless tobacco: Never Used   Substance and Sexual Activity    Alcohol use:  Yes     Alcohol/week: 1.0 standard drink     Types: 1 Shots of liquor per week    Drug use: No    Sexual activity: Yes     Partners: Female   Other Topics Concern    Not on file   Social History Narrative    Not on file     Social Determinants of Health     Financial Resource Strain:     Difficulty of Paying Living Expenses: Not on file   Food Insecurity:     Worried About Running Out of Food in the Last Year: Not on file    Jazmine of Food in the Last Year: Not on file   Transportation Needs:     Lack of Transportation (Medical): Not on file    Lack of Transportation (Non-Medical): Not on file   Physical Activity:     Days of Exercise per Week: Not on file    Minutes of Exercise per Session: Not on file   Stress:     Feeling of Stress : Not on file   Social Connections:     Frequency of Communication with Friends and Family: Not on file    Frequency of Social Gatherings with Friends and Family: Not on file    Attends Evangelical Services: Not on file    Active Member of [address] [place] [address] or Organizations: Not on file    Attends Club or Organization Meetings: Not on file    Marital Status: Not on file   Intimate Partner Violence:     Fear of Current or Ex-Partner: Not on file    Emotionally Abused: Not on file    Physically Abused: Not on file    Sexually Abused: Not on file   Housing Stability:     Unable to Pay for Housing in the Last Year: Not on file    Number of [place] in the Last Year: Not on file    Unstable Housing in the Last Year: Not on file         ALLERGIES: Vioxx [rofecoxib]    Review of Systems   Constitutional: Negative for chills and fever. HENT: Positive for congestion. Negative for sore throat. Eyes: Negative for pain and redness. Respiratory: Positive for cough and shortness of breath. Cardiovascular: Negative for chest pain and palpitations. Gastrointestinal: Positive for diarrhea. Negative for abdominal pain, nausea and vomiting. Genitourinary: Negative for frequency and hematuria. Musculoskeletal: Negative for back pain and neck pain. Skin: Negative for rash and wound.    Neurological: Negative for dizziness and headaches. Hematological: Does not bruise/bleed easily. Vitals:    02/08/22 0751   BP: (!) 165/81   Pulse: 92   Resp: 16   Temp: 98.2 °F (36.8 °C)   SpO2: 96%   Weight: 121.3 kg (267 lb 6.7 oz)   Height: 6' (1.829 m)            Physical Exam  Vitals and nursing note reviewed. Constitutional:       General: He is not in acute distress. Appearance: He is well-developed. HENT:      Head: Normocephalic and atraumatic. Eyes:      Conjunctiva/sclera: Conjunctivae normal.      Pupils: Pupils are equal, round, and reactive to light. Cardiovascular:      Rate and Rhythm: Normal rate and regular rhythm. Heart sounds: Normal heart sounds. No murmur heard. No friction rub. No gallop. Comments: Equal radial, dp, and pt pulses  Pulmonary:      Effort: Pulmonary effort is normal. No respiratory distress. Breath sounds: Normal breath sounds. No wheezing or rales. Abdominal:      General: Bowel sounds are normal. There is no distension. Palpations: Abdomen is soft. Tenderness: There is no abdominal tenderness. There is no guarding or rebound. Musculoskeletal:         General: Normal range of motion. Cervical back: Normal range of motion and neck supple. Skin:     General: Skin is warm and dry. Capillary Refill: Capillary refill takes less than 2 seconds. Findings: No rash. Neurological:      Mental Status: He is alert and oriented to person, place, and time. MDM       Procedures          Overall well-appearing 79-year-old male presenting today with congestion and diarrhea. Given his week of diarrhea I did check labs which were unremarkable. He was given 1 L of IV fluids. Chest x-ray clear. I suspect all of his symptoms are related to a viral process. I encouraged him to stop using Afrin due to his rebound effect after two or 3 days of use. Given a prescription for Coricidin. Will test for Covid. Discharged home. ICD-10-CM ICD-9-CM    1. Viral infection  B34.9 079.99    2. Diarrhea of presumed infectious origin  R19.7 009.3    3.  SOB (shortness of breath)  R06.02 786.05      LINDA Garcia, DO

## 2022-02-11 ENCOUNTER — NURSE TRIAGE (OUTPATIENT)
Dept: OTHER | Facility: CLINIC | Age: 62
End: 2022-02-11

## 2022-02-11 NOTE — TELEPHONE ENCOUNTER
Received call from Kate saucedo at [facility] with Red Flag Complaint. Subjective: Caller states \"he has had diarrhea a week from this past Monday. When he does not take imodium it comes back\" He went to ED last Tuesday and was told he was not dehydrated. States he only sees blood when he wipes, not in the stool. Current Symptoms: diarrhea, cannot sleep    Onset: about a week and a half  ago; not improving unless he takes Imodium    Associated Symptoms: diarrhea, reduced appetite    Pain Severity: denies; n/a; n/a, does have some cramping from time to time    Temperature: denies fever n/a    What has been tried: Imodium, BC poweder for headaches    LMP: NA Pregnant: NA    Recommended disposition: See in office today, was told [facility] if no appointments. Care advice provided, patient verbalizes understanding; denies any other questions or concerns; instructed to call back for any new or worsening symptoms. Writer provided warm transfer to Josiah Lomax at [facility] for appointment scheduling    Attention Provider: Thank you for allowing me to participate in the care of your patient. The patient was connected to triage in response to information provided to the [facility]. Please do not respond through this encounter as the response is not directed to a shared pool.       Reason for Disposition   MODERATE diarrhea (e.g., 4-6 times / day more than normal) and present > 48 hours (2 days)    Protocols used: [id number]

## 2022-02-27 ENCOUNTER — TRANSCRIBE ORDER (OUTPATIENT)
Dept: SCHEDULING | Age: 62
End: 2022-02-27

## 2022-02-27 DIAGNOSIS — R19.7 DIARRHEA, UNSPECIFIED: ICD-10-CM

## 2022-02-27 DIAGNOSIS — R10.0 ACUTE ABDOMEN: Primary | ICD-10-CM

## 2022-02-28 ENCOUNTER — HOSPITAL ENCOUNTER (OUTPATIENT)
Dept: CT IMAGING | Age: 62
Discharge: HOME | End: 2022-02-28
Attending: NURSE PRACTITIONER
Payer: COMMERCIAL

## 2022-02-28 DIAGNOSIS — R10.0 ACUTE ABDOMEN: ICD-10-CM

## 2022-02-28 DIAGNOSIS — R19.7 DIARRHEA, UNSPECIFIED: ICD-10-CM

## 2022-02-28 PROCEDURE — 74177 CT ABD & PELVIS W/CONTRAST: CPT

## 2022-02-28 PROCEDURE — 74011000636 HC RX REV CODE- 636: Performed by: RADIOLOGY

## 2022-02-28 RX ADMIN — IOPAMIDOL 100 ML: 755 INJECTION, SOLUTION INTRAVENOUS at 14:07

## 2022-03-07 RX ORDER — VALSARTAN AND HYDROCHLOROTHIAZIDE 160; 25 MG/1; MG/1
TABLET ORAL
Qty: 90 TABLET | Refills: 1 | OUTPATIENT
Start: 2022-03-07

## 2022-03-07 NOTE — TELEPHONE ENCOUNTER
Called pt to get apt scheduled, he said he had started with a new doctor, advised I would let them know.      3/7/22 @ 10:39 AM

## 2022-03-16 RX ORDER — ROSUVASTATIN CALCIUM 20 MG/1
TABLET, COATED ORAL
Qty: 90 TABLET | Refills: 1 | OUTPATIENT
Start: 2022-03-16

## 2022-03-18 PROBLEM — M50.20 HNP (HERNIATED NUCLEUS PULPOSUS), CERVICAL: Status: ACTIVE | Noted: 2017-08-28

## 2022-03-19 PROBLEM — E78.5 HYPERLIPIDEMIA: Status: ACTIVE | Noted: 2017-03-02

## 2022-03-19 PROBLEM — K76.0 HEPATIC STEATOSIS: Status: ACTIVE | Noted: 2017-03-02

## 2022-03-19 PROBLEM — E66.01 SEVERE OBESITY (HCC): Status: ACTIVE | Noted: 2020-09-01

## 2022-03-19 PROBLEM — E11.9 DIABETES MELLITUS TYPE 2, CONTROLLED (HCC): Status: ACTIVE | Noted: 2017-03-02

## 2022-03-19 PROBLEM — R93.89 ABNORMAL CHEST X-RAY: Status: ACTIVE | Noted: 2017-01-18

## 2022-03-19 PROBLEM — R53.83 FATIGUE: Status: ACTIVE | Noted: 2017-03-02

## 2022-03-19 PROBLEM — M54.2 NECK PAIN: Status: ACTIVE | Noted: 2017-08-21

## 2022-03-19 PROBLEM — E78.1 HYPERTRIGLYCERIDEMIA: Status: ACTIVE | Noted: 2017-10-16

## 2022-03-19 PROBLEM — I10 ESSENTIAL HYPERTENSION: Status: ACTIVE | Noted: 2018-03-12

## 2022-03-20 PROBLEM — G47.00 INSOMNIA: Status: ACTIVE | Noted: 2017-01-18

## 2022-08-05 NOTE — PATIENT INSTRUCTIONS
Bronchitis: Care Instructions  Your Care Instructions    Bronchitis is inflammation of the bronchial tubes, which carry air to the lungs. The tubes swell and produce mucus, or phlegm. The mucus and inflamed bronchial tubes make you cough. You may have trouble breathing. Most cases of bronchitis are caused by viruses like those that cause colds. Antibiotics usually do not help and they may be harmful. Bronchitis usually develops rapidly and lasts about 2 to 3 weeks in otherwise healthy people. Follow-up care is a key part of your treatment and safety. Be sure to make and go to all appointments, and call your doctor if you are having problems. It's also a good idea to know your test results and keep a list of the medicines you take. How can you care for yourself at home? · Take all medicines exactly as prescribed. Call your doctor if you think you are having a problem with your medicine. · Get some extra rest.  · Take an over-the-counter pain medicine, such as acetaminophen (Tylenol), ibuprofen (Advil, Motrin), or naproxen (Aleve) to reduce fever and relieve body aches. Read and follow all instructions on the label. · Do not take two or more pain medicines at the same time unless the doctor told you to. Many pain medicines have acetaminophen, which is Tylenol. Too much acetaminophen (Tylenol) can be harmful. · Take an over-the-counter cough medicine that contains dextromethorphan to help quiet a dry, hacking cough so that you can sleep. Avoid cough medicines that have more than one active ingredient. Read and follow all instructions on the label. · Breathe moist air from a humidifier, hot shower, or sink filled with hot water. The heat and moisture will thin mucus so you can cough it out. · Do not smoke. Smoking can make bronchitis worse. If you need help quitting, talk to your doctor about stop-smoking programs and medicines. These can increase your chances of quitting for good.   When should you call for help? Call 911 anytime you think you may need emergency care. For example, call if:    · You have severe trouble breathing.    Call your doctor now or seek immediate medical care if:    · You have new or worse trouble breathing.     · You cough up dark brown or bloody mucus (sputum).     · You have a new or higher fever.     · You have a new rash.    Watch closely for changes in your health, and be sure to contact your doctor if:    · You cough more deeply or more often, especially if you notice more mucus or a change in the color of your mucus.     · You are not getting better as expected. Where can you learn more? Go to http://laney-leidy.info/. Enter H333 in the search box to learn more about \"Bronchitis: Care Instructions. \"  Current as of: June 9, 2019  Content Version: 12.2  © 6320-1547 [employer]. Care instructions adapted under license by [employer] (which disclaims liability or warranty for this information). If you have questions about a medical condition or this instruction, always ask your healthcare professional. [address] any warranty or liability for your use of this information. none

## 2023-03-03 ENCOUNTER — HOSPITAL ENCOUNTER (EMERGENCY)
Age: 63
Discharge: HOME | End: 2023-03-03
Attending: EMERGENCY MEDICINE
Payer: COMMERCIAL

## 2023-03-03 VITALS
HEART RATE: 90 BPM | TEMPERATURE: 98.2 F | SYSTOLIC BLOOD PRESSURE: 159 MMHG | RESPIRATION RATE: 16 BRPM | DIASTOLIC BLOOD PRESSURE: 100 MMHG | OXYGEN SATURATION: 96 %

## 2023-03-03 DIAGNOSIS — M54.41 RIGHT-SIDED LOW BACK PAIN WITH RIGHT-SIDED SCIATICA, UNSPECIFIED CHRONICITY: Primary | ICD-10-CM

## 2023-03-03 PROCEDURE — 74011000250 HC RX REV CODE- 250: Performed by: EMERGENCY MEDICINE

## 2023-03-03 PROCEDURE — 96372 THER/PROPH/DIAG INJ SC/IM: CPT

## 2023-03-03 PROCEDURE — 74011250637 HC RX REV CODE- 250/637: Performed by: EMERGENCY MEDICINE

## 2023-03-03 PROCEDURE — 99284 EMERGENCY DEPT VISIT MOD MDM: CPT

## 2023-03-03 PROCEDURE — 74011250636 HC RX REV CODE- 250/636: Performed by: EMERGENCY MEDICINE

## 2023-03-03 RX ORDER — LIDOCAINE 4 G/100G
1 PATCH TOPICAL
Status: DISCONTINUED | OUTPATIENT
Start: 2023-03-03 | End: 2023-03-03 | Stop reason: HOSPADM

## 2023-03-03 RX ORDER — METHOCARBAMOL 750 MG/1
750 TABLET, FILM COATED ORAL
Qty: 20 TABLET | Refills: 0 | Status: SHIPPED | OUTPATIENT
Start: 2023-03-03

## 2023-03-03 RX ORDER — TRAMADOL HYDROCHLORIDE 50 MG/1
50 TABLET ORAL
COMMUNITY
End: 2023-03-03

## 2023-03-03 RX ORDER — METHOCARBAMOL 500 MG/1
1000 TABLET, FILM COATED ORAL
Status: COMPLETED | OUTPATIENT
Start: 2023-03-03 | End: 2023-03-03

## 2023-03-03 RX ORDER — LIDOCAINE 50 MG/G
PATCH TOPICAL
Qty: 30 EACH | Refills: 0 | Status: SHIPPED | OUTPATIENT
Start: 2023-03-03

## 2023-03-03 RX ORDER — KETOROLAC TROMETHAMINE 30 MG/ML
30 INJECTION, SOLUTION INTRAMUSCULAR; INTRAVENOUS ONCE
Status: COMPLETED | OUTPATIENT
Start: 2023-03-03 | End: 2023-03-03

## 2023-03-03 RX ORDER — METHYLPREDNISOLONE 4 MG/1
4 TABLET ORAL
Qty: 1 DOSE PACK | Refills: 0 | Status: SHIPPED | OUTPATIENT
Start: 2023-03-03

## 2023-03-03 RX ADMIN — METHOCARBAMOL TABLETS 1000 MG: 500 TABLET, COATED ORAL at 07:57

## 2023-03-03 RX ADMIN — KETOROLAC TROMETHAMINE 30 MG: 30 INJECTION, SOLUTION INTRAMUSCULAR; INTRAVENOUS at 07:57

## 2023-03-03 NOTE — ED TRIAGE NOTES
Pt assisted to treatment he states that he was started on Tramadol for his pain in his back and then he began to itch, N/V,  have some SOB with anxiety during the night. He is here for something new for his pain in the back that he has had since last Friday or Saturday denies any injury.

## 2023-03-03 NOTE — ED PROVIDER NOTES
72-year-old male with known DDD and bulging disc, and chronic low back pain presents with acute on chronic low back pain for the last week. He denies any trauma or known change in his physical activity level to precipitate injury. He notes right-sided low back pain that periodically radiates down his right lower extremity and is worse with certain movements and with standing. He denies any numbness, weakness, incontinence, fever, chills, abdominal pain, or any other medical concerns with the back pain. He was taking Aleve initially to no avail of his symptoms. He was seen by his primary care provider on 3/1 and was prescribed tramadol and 5 mg Flexeril. He states that he started taking that yesterday but stated the tramadol seemed to make him very itchy and feel very anxious made him have episodic shortness of breath that he thought were panic type attacks last night leading to difficulty sleeping. Laurence Sánchez He denies any noted rash on his skin with the itch and states that he is no longer having any itching, difficulty breathing, but does still feel a little anxious from the medication. He states that he used to follow with orthopedics for his back a long time ago but no longer, stating that his back was doing fairly well for some time. Past Medical History:   Diagnosis Date    Arthritis     Chronic pain     LOWER BACK , LEFT ARM    Deviated septum     Diabetes ([address]     BORDERLINE PER PATIENT    GERD (gastroesophageal reflux disease)     High cholesterol     Hypertension     Unspecified sleep apnea 2014    NO C-PAP       Past Surgical History:   Procedure Laterality Date    HX APPENDECTOMY      HX GI      COLONOSCOPY    HX HEENT  2014    Tonsils and uvula removed.     HX OTHER SURGICAL      WISDOM TEETH EXTRACTION    HX OTHER SURGICAL      EXPLORATORY LAP    HX SEPTOPLASTY           Family History:   Problem Relation Age of Onset    Heart Disease Mother     Dementia Mother         Eyad Neff Problems Maternal Aunt         TROUBLE BREATHING AFTER HIP SURGERY       Social History     Socioeconomic History    Marital status:      Spouse name: Not on file    Number of children: Not on file    Years of education: Not on file    Highest education level: Not on file   Occupational History    Not on file   Tobacco Use    Smoking status: Every Day     Packs/day: 1.50     Years: 21.00     Pack years: 31.50     Types: Cigarettes    Smokeless tobacco: Never   Substance and Sexual Activity    Alcohol use: Yes     Alcohol/week: 1.0 standard drink     Types: 1 Shots of liquor per week    Drug use: No    Sexual activity: Yes     Partners: Female   Other Topics Concern    Not on file   Social History Narrative    Not on file     Social Determinants of Health     Financial Resource Strain: Not on file   Food Insecurity: Not on file   Transportation Needs: Not on file   Physical Activity: Not on file   Stress: Not on file   Social Connections: Not on file   Intimate Partner Violence: Not on file   Housing Stability: Not on file         ALLERGIES: Tramadol and Vioxx [rofecoxib]    Review of Systems   Constitutional:  Negative for activity change, appetite change, chills and fever. HENT:  Negative for congestion, rhinorrhea, sinus pain, sneezing and sore throat. Eyes:  Negative for photophobia and visual disturbance. Respiratory:  Negative for cough and shortness of breath. Cardiovascular:  Negative for chest pain. Gastrointestinal:  Negative for abdominal pain, blood in stool, constipation, diarrhea, nausea and vomiting. Genitourinary:  Negative for difficulty urinating, dysuria, flank pain, hematuria, penile pain and testicular pain. Musculoskeletal:  Positive for back pain. Negative for arthralgias, myalgias and neck pain. Skin:  Negative for rash and wound. Neurological:  Negative for syncope, weakness, light-headedness, numbness and headaches.    Psychiatric/Behavioral:  Negative for self-injury and suicidal ideas. All other systems reviewed and are negative. Vitals:    03/03/23 0738   BP: (!) 159/100   Pulse: 90   Resp: 16   Temp: 98.2 °F (36.8 °C)   SpO2: 96%            Physical Exam  Vitals and nursing note reviewed. Constitutional:       General: He is not in acute distress. Appearance: Normal appearance. He is well-developed. He is obese. He is not diaphoretic. HENT:      Head: Normocephalic and atraumatic. Nose: Nose normal.   Eyes:      Extraocular Movements: Extraocular movements intact. Conjunctiva/sclera: Conjunctivae normal.      Pupils: Pupils are equal, round, and reactive to light. Cardiovascular:      Rate and Rhythm: Normal rate and regular rhythm. Heart sounds: Normal heart sounds. Pulmonary:      Effort: Pulmonary effort is normal. No respiratory distress. Breath sounds: Normal breath sounds. No wheezing. Abdominal:      General: There is no distension. Palpations: Abdomen is soft. Tenderness: There is no abdominal tenderness. Musculoskeletal:         General: No tenderness. Cervical back: Neck supple. Back:    Skin:     General: Skin is warm and dry. Findings: No rash. Neurological:      General: No focal deficit present. Mental Status: He is alert and oriented to person, place, and time. Cranial Nerves: No cranial nerve deficit. Sensory: No sensory deficit. Motor: No weakness. Coordination: Coordination normal.        Medical Decision Making    72-year-old male presents with acute on chronic low back pain/sciatica and reported adverse reaction to tramadol. No signs or symptoms of anaphylactic type reaction on exam.  He was recommended to stop taking the tramadol. He was given IM Toradol, lidocaine patch, and Robaxin in the ED and Rx Medrol Dosepak, lidocaine patches, and Robaxin for further symptomatic relief.     Recommended PCP and orthopedics follow-up and physical therapy type exercises. Patient states that he is well versed in these exercises and treatments as he has had this for many years. This plan was discussed with the patient at the bedside and he stated both understanding and agreement. Please note that this dictation was completed with 7digital, the computer voice recognition software. Quite often unanticipated grammatical, syntax, homophones, and other interpretive errors are inadvertently transcribed by the computer software. Please disregard these errors. Please excuse any errors that have escaped final proofreading.       Procedures

## 2023-03-06 ENCOUNTER — TRANSCRIBE ORDER (OUTPATIENT)
Dept: SCHEDULING | Age: 63
End: 2023-03-06

## 2023-03-06 DIAGNOSIS — M54.16 RADICULOPATHY OF LUMBAR REGION: Primary | ICD-10-CM

## 2023-03-16 ENCOUNTER — HOSPITAL ENCOUNTER (OUTPATIENT)
Dept: MRI IMAGING | Age: 63
Discharge: HOME | End: 2023-03-16
Attending: FAMILY MEDICINE
Payer: COMMERCIAL

## 2023-03-16 DIAGNOSIS — M54.16 RADICULOPATHY OF LUMBAR REGION: ICD-10-CM

## 2023-03-16 PROCEDURE — 72148 MRI LUMBAR SPINE W/O DYE: CPT

## 2023-03-17 ENCOUNTER — PATIENT MESSAGE (OUTPATIENT)
Dept: ORTHOPEDIC SURGERY | Age: 63
End: 2023-03-17

## 2023-03-17 NOTE — TELEPHONE ENCOUNTER
From: Nathalia Gibson. To: Charity Dejesus PA-C  Sent: 3/8/2023 11:41 AM EST  Subject: History    History questionnaire submitted on Wednesday March 08, 2023 at 11:40:45 AM  Questionnaire: History  Patient: Tab Farley [[id number]]      Medical History:    Question: Anemia or low red blood cell count  Response: No Response  Date: Comments:     Question: Arthritis  Response: Yes  Date: Comments:     Question: Autoimmune diseases, such as rheumatoid   arthritis or lupus  Response: No Response  Date: Comments:     Question: Asthma  Response: No Response  Date: Comments:     Question: Coronary artery disease or heart disease  Response: No Response  Date: Comments:     Question: Cancer.  Please comment on type of cancer  Response: No Response  Date: Comments:     Question: Congestive heart failure (CHF)  Response: No Response  Date: Comments:     Question: Deep vein thrombosis (DVT) or blood clots in   your veins  Response: No Response  Date: Comments:     Question: Diabetes  Response: Yes  Date: Comments:     Question: Liver disease such as cirrhosis or fatty   liver disease  Response: No Response  Date: Comments:     Question: Stroke or mini-strokes  Response: No Response  Date: Comments:     Question: Chronic obstructive pulmonary disease (COPD)   or emphysema  Response: No Response  Date: Comments:     Question: Hypertension or high blood pressure  Response: Yes  Date: Comments:     Question: Seizures or epilepsy  Response: No Response  Date: Comments:     Question: Chronic Kidney Disease  Response: No Response  Date: Comments:     Question: Osteoporosis  Response: No Response  Date: Comments:         Surgical History:    Question: Ankle fracture surgery  Response: No Response  Date: Comments:     Question: Joint replacement  Response: No Response  Date: Comments:     Question: Shoulder surgery  Response: No Response  Date: Comments:     Question: Back Surgery  Response: No Response  Date: Comments: Question: Shoulder Replacement  Response: No Response  Date: Comments:     Question: Elbow surgery  Response: No Response  Date: Comments:     Question: Knee Replacement  Response: No Response  Date: Comments:     Question: SPINAL FUSION  Response: No Response  Date: Comments:     Question: Foot surgery  Response: No Response  Date: Comments:     Question: Knee Arthroscopy Surgery  Response: No Response  Date: Comments:     Question: Spinal Surgery  Response: No Response  Date: Comments:     Question: Hand surgery  Response: No Response  Date: Comments:     Question: Knee ACL Repair  Response: No Response  Date: Comments:     Question: Thoracic surgery or chest surgery  Response: No Response  Date: Comments:     Question: Hip surgery  Response: No Response  Date: Comments:     Question: LAMINECTOMY  Response: No Response  Date: Comments:     Question: Wrist surgery  Response: No Response  Date: Comments:     Question: Hip Replacement  Response: No Response  Date: Comments:     Question: Neck surgery  Response: Yes  Date: Comments:     Question: Abdominal?  Response: No Response  Date: Comments:     Question: Heart surgery  Response: No Response  Date:  Comments:         Social History:    Question: Do you drink alcohol? Response: Yes  Drinks/Week: 0 Glasses of wine, 0 Cans of beer, 1   Shots of liquor, 0 Drinks containing 0.5 oz of alcohol  Comments:     Question: Do you smoke cigarettes or cigars? Response: Current Everyday Smoker  How many packs per day do you smoke?: 1.5  How many years have you been smoking?: 32  If you quit smoking, when did you quit?:   Comments:     Question: Drugs of Abuse  Response: No    Question: Are you sexually active?   Response: Yes  Partners: Female  Birth-Control/Protection: Female  Comments:         Family History:    Problem: Heart disease, such as heart attacks    Relation: Mother  Name: Fatou Johnson  Comments: enlarged heart

## 2023-03-17 NOTE — TELEPHONE ENCOUNTER
From: Ila Whitley. Sent: 3/17/2023 10:06 AM EDT  To: [facility]  Subject: Previous History Questionnaire Submission    This message was automatically generated when an appointment dated 3/28/2023 was cancelled. The cancelled appointment contained the following questionnaire data:    History questionnaire submitted on Wednesday March 08, 2023 at 11:40:45 AM  Questionnaire: History  Patient: Scott Low [[id number]]      Medical History:    Question: Anemia or low red blood cell count  Response: No Response  Date: Comments:     Question: Arthritis  Response: Yes  Date: Comments:     Question: Autoimmune diseases, such as rheumatoid   arthritis or lupus  Response: No Response  Date: Comments:     Question: Asthma  Response: No Response  Date: Comments:     Question: Coronary artery disease or heart disease  Response: No Response  Date: Comments:     Question: Cancer.  Please comment on type of cancer  Response: No Response  Date: Comments:     Question: Congestive heart failure (CHF)  Response: No Response  Date: Comments:     Question: Deep vein thrombosis (DVT) or blood clots in   your veins  Response: No Response  Date: Comments:     Question: Diabetes  Response: Yes  Date: Comments:     Question: Liver disease such as cirrhosis or fatty   liver disease  Response: No Response  Date: Comments:     Question: Stroke or mini-strokes  Response: No Response  Date: Comments:     Question: Chronic obstructive pulmonary disease (COPD)   or emphysema  Response: No Response  Date: Comments:     Question: Hypertension or high blood pressure  Response: Yes  Date: Comments:     Question: Seizures or epilepsy  Response: No Response  Date: Comments:     Question: Chronic Kidney Disease  Response: No Response  Date: Comments:     Question: Osteoporosis  Response: No Response  Date: Comments:         Surgical History:    Question: Ankle fracture surgery  Response: No Response  Date: Comments:     Question: Joint replacement  Response: No Response  Date: Comments:     Question: Shoulder surgery  Response: No Response  Date: Comments:     Question: Back Surgery  Response: No Response  Date: Comments:     Question: Shoulder Replacement  Response: No Response  Date: Comments:     Question: Elbow surgery  Response: No Response  Date: Comments:     Question: Knee Replacement  Response: No Response  Date: Comments:     Question: SPINAL FUSION  Response: No Response  Date: Comments:     Question: Foot surgery  Response: No Response  Date: Comments:     Question: Knee Arthroscopy Surgery  Response: No Response  Date: Comments:     Question: Spinal Surgery  Response: No Response  Date: Comments:     Question: Hand surgery  Response: No Response  Date: Comments:     Question: Knee ACL Repair  Response: No Response  Date: Comments:     Question: Thoracic surgery or chest surgery  Response: No Response  Date: Comments:     Question: Hip surgery  Response: No Response  Date: Comments:     Question: LAMINECTOMY  Response: No Response  Date: Comments:     Question: Wrist surgery  Response: No Response  Date: Comments:     Question: Hip Replacement  Response: No Response  Date: Comments:     Question: Neck surgery  Response: Yes  Date: Comments:     Question: Abdominal?  Response: No Response  Date: Comments:     Question: Heart surgery  Response: No Response  Date: Comments:         Social History:    Question: Do you drink alcohol? Response: Yes  Drinks/Week: 0 Glasses of wine, 0 Cans of beer, 1   Shots of liquor, 0 Drinks containing 0.5 oz of alcohol  Comments:     Question: Do you smoke cigarettes or cigars? Response: Current Everyday Smoker  How many packs per day do you smoke?: 1.5  How many years have you been smoking?: 32  If you quit smoking, when did you quit?:   Comments:     Question: Drugs of Abuse  Response: No    Question: Are you sexually active?   Response: Yes  Partners: Female  Birth-Control/Protection: Female  Comments: Family History:    Problem: Heart disease, such as heart attacks    Relation: Mother  Name: Madhu Anderson  Comments: enlarged heart

## 2024-03-07 ENCOUNTER — HOSPITAL ENCOUNTER (EMERGENCY)
Facility: HOSPITAL | Age: 64
Discharge: HOME | End: 2024-03-07
Attending: EMERGENCY MEDICINE
Payer: COMMERCIAL

## 2024-03-07 VITALS
DIASTOLIC BLOOD PRESSURE: 63 MMHG | TEMPERATURE: 98.4 F | WEIGHT: 235 LBS | OXYGEN SATURATION: 98 % | BODY MASS INDEX: 31.83 KG/M2 | RESPIRATION RATE: 16 BRPM | HEART RATE: 84 BPM | SYSTOLIC BLOOD PRESSURE: 141 MMHG | HEIGHT: 72 IN

## 2024-03-07 DIAGNOSIS — L02.91 ABSCESS: Primary | ICD-10-CM

## 2024-03-07 DIAGNOSIS — R51.9 ACUTE NONINTRACTABLE HEADACHE, UNSPECIFIED HEADACHE TYPE: ICD-10-CM

## 2024-03-07 PROCEDURE — 96372 THER/PROPH/DIAG INJ SC/IM: CPT

## 2024-03-07 PROCEDURE — 99284 EMERGENCY DEPT VISIT MOD MDM: CPT

## 2024-03-07 PROCEDURE — 6360000002 HC RX W HCPCS: Performed by: EMERGENCY MEDICINE

## 2024-03-07 RX ORDER — KETOROLAC TROMETHAMINE 30 MG/ML
30 INJECTION, SOLUTION INTRAMUSCULAR; INTRAVENOUS
Status: COMPLETED | OUTPATIENT
Start: 2024-03-07 | End: 2024-03-07

## 2024-03-07 RX ORDER — CEPHALEXIN 500 MG/1
500 CAPSULE ORAL 2 TIMES DAILY
Qty: 14 CAPSULE | Refills: 0 | Status: SHIPPED | OUTPATIENT
Start: 2024-03-07 | End: 2024-03-14

## 2024-03-07 RX ADMIN — KETOROLAC TROMETHAMINE 30 MG: 30 INJECTION, SOLUTION INTRAMUSCULAR; INTRAVENOUS at 16:17

## 2024-03-07 ASSESSMENT — PAIN - FUNCTIONAL ASSESSMENT
PAIN_FUNCTIONAL_ASSESSMENT: PREVENTS OR INTERFERES SOME ACTIVE ACTIVITIES AND ADLS

## 2024-03-07 ASSESSMENT — PAIN SCALES - GENERAL
PAINLEVEL_OUTOF10: 8
PAINLEVEL_OUTOF10: 7
PAINLEVEL_OUTOF10: 6

## 2024-03-07 ASSESSMENT — PAIN DESCRIPTION - LOCATION
LOCATION: HEAD

## 2024-03-07 ASSESSMENT — PAIN DESCRIPTION - ORIENTATION: ORIENTATION: MID

## 2024-03-07 ASSESSMENT — PAIN DESCRIPTION - DESCRIPTORS
DESCRIPTORS: ACHING
DESCRIPTORS: ACHING
DESCRIPTORS: POUNDING

## 2024-03-07 ASSESSMENT — LIFESTYLE VARIABLES: HOW OFTEN DO YOU HAVE A DRINK CONTAINING ALCOHOL: NEVER

## 2024-03-07 NOTE — ED TRIAGE NOTES
Patient presents ambulatory to treatment area with a steady gait. Patient states he began with severe headache last night.  Worsened today.  Patient states he took his blood pressure and got high readings.  States \"it may be an infection; I had an abscess in my inner thigh.  I did home treatments and it popped.  I have been keeping it clean, but it may be infected.\" Patient also states he has had a cough since December.

## 2024-03-07 NOTE — ED PROVIDER NOTES
[facility] EMERGENCY DEPT  EMERGENCY DEPARTMENT ENCOUNTER      Pt Name: Dominick Dennison Jr.  MRN: [id number]  Birthdate 1960  Date of evaluation: 3/7/2024  Provider: Lico Ramirez MD    CHIEF COMPLAINT       Chief Complaint   Patient presents with    Hypertension    Headache         HISTORY OF PRESENT ILLNESS   (Location/Symptom, Timing/Onset, Context/Setting, Quality, Duration, Modifying Factors, Severity)  Note limiting factors.   63-year-old male with PMHx of hypercholesterolemia, hypertension presents to the emergency department with multiple complaints, including gradual onset, progressively worsening headache since last night, a \"boil\" to his perineal area, a cough x 3 months, and elevated blood pressure since this morning.  Patient reports that his BP was reading 200s over 80s at home.  He has no additional complaints at this time.    The history is provided by the patient.         Review of External Medical Records:     Nursing Notes were reviewed.    REVIEW OF SYSTEMS    (2-9 systems for level 4, 10 or more for level 5)     Review of Systems   Constitutional: Negative.    HENT: Negative.     Eyes: Negative.    Respiratory:  Positive for cough.    Cardiovascular: Negative.    Gastrointestinal: Negative.    Genitourinary: Negative.    Musculoskeletal: Negative.    Skin:  Positive for rash.   Neurological:  Positive for headaches.   Psychiatric/Behavioral: Negative.         Except as noted above the remainder of the review of systems was reviewed and negative.       PAST MEDICAL HISTORY     Past Medical History:   Diagnosis Date    Arthritis     Chronic pain     LOWER BACK , LEFT ARM    Deviated septum     Diabetes ([facility])     BORDERLINE PER PATIENT    GERD (gastroesophageal reflux disease)     High cholesterol     Hypertension     Unspecified sleep apnea 2014    NO C-PAP         SURGICAL HISTORY       Past Surgical History:   Procedure Laterality Date    APPENDECTOMY      GI      COLONOSCOPY    HEENT 2014

## 2024-03-07 NOTE — DISCHARGE INSTRUCTIONS
You were seen in the emergency department for a headache and an abscess.  Although an exact cause of your symptoms was not identified, the most likely cause is migraine versus tension headache.  Please take any medications prescribed at this visit as instructed.  Please follow-up with your PCP or return to the emergency department if you experience a worsening of symptoms or any new symptoms that are concerning to you.

## 2024-03-08 ASSESSMENT — ENCOUNTER SYMPTOMS
EYES NEGATIVE: 1
GASTROINTESTINAL NEGATIVE: 1
COUGH: 1

## 2025-02-26 NOTE — ED PROVIDER NOTES
HPI Comments: 57 yo WM with hx DM, htn xol presents with c/o sob. Pt reports feeling ill since the beginning of the month when his pcp dx him with the flu and placed him on a zpack and inhaler. Body aches and fevers resolved but cough has persisted. + clear sputum. He has been using the inhaler (last used this morning) and he feels it helps initially but wears off. Reports yesterday he had increasing sob with exertion and felt like he couldn't catch his breath. States he coughed so hard he couldn't breath, almost vomited and almost passed out. Denies cp with exertion. He has had PNA and bronchitis in the past. Never been hospitalized for it. He is still smoking. Reports blood sugar was 139 this morning    Also c/l rash to right inner foot for months. Used a cream at one point and it almost disappeared but it is back    Patient is a 56 y.o. male presenting with cough. The history is provided by the patient. Cough   Associated symptoms include shortness of breath. Pertinent negatives include no chest pain. Past Medical History:   Diagnosis Date    Arthritis     Chronic pain     LOWER BACK , LEFT ARM    Deviated septum     Diabetes (HCC)     BORDERLINE PER PATIENT    GERD (gastroesophageal reflux disease)     High cholesterol     Hypertension     Unspecified sleep apnea 2014    NO C-PAP       Past Surgical History:   Procedure Laterality Date    HX APPENDECTOMY      HX GI      COLONOSCOPY    HX HEENT  2014    Tonsils and uvula removed.     HX OTHER SURGICAL      WISDOM TEETH EXTRACTION    HX OTHER SURGICAL      EXPLORATORY LAP    HX SEPTOPLASTY           Family History:   Problem Relation Age of Onset    Heart Disease Mother     Dementia Mother      Donnal Loge Anesth Problems Maternal Aunt      TROUBLE BREATHING AFTER HIP SURGERY       Social History     Social History    Marital status:      Spouse name: N/A    Number of children: N/A    Years of education: N/A     Occupational History    Not on file. Social History Main Topics    Smoking status: Current Every Day Smoker     Packs/day: 1.50     Years: 21.00    Smokeless tobacco: Never Used    Alcohol use 1.5 oz/week     3 Cans of beer per week    Drug use: No    Sexual activity: Yes     Other Topics Concern    Not on file     Social History Narrative         ALLERGIES: Vioxx [rofecoxib]    Review of Systems   Constitutional: Positive for fatigue. Negative for fever. Respiratory: Positive for cough and shortness of breath. Cardiovascular: Negative for chest pain. All other systems reviewed and are negative. Vitals:    01/29/18 0953   BP: 158/88   Pulse: 89   Resp: 18   Temp: 99.1 °F (37.3 °C)   SpO2: 98%   Weight: 124.1 kg (273 lb 9.5 oz)   Height: 6' (1.829 m)            Physical Exam   Constitutional: He is oriented to person, place, and time. He appears well-developed and well-nourished. No distress. HENT:   Head: Normocephalic and atraumatic. Eyes: Conjunctivae are normal.   Neck: Normal range of motion. Cardiovascular: Normal rate, regular rhythm, normal heart sounds and intact distal pulses. Exam reveals no friction rub. No murmur heard. Pulmonary/Chest: Effort normal and breath sounds normal. No respiratory distress. He has no wheezes. He has no rales. He exhibits no tenderness. + bronchospastic cough   Abdominal: Soft. Bowel sounds are normal. He exhibits no distension. There is no tenderness. There is no rebound and no guarding. Musculoskeletal: Normal range of motion. Neurological: He is alert and oriented to person, place, and time. Coordination normal.   Skin: Skin is warm and dry. He is not diaphoretic. No pallor. Shiny erythematous rash to inner aspect of right foot approx 3 cm in diameter- no draiange   Psychiatric: He has a normal mood and affect. His behavior is normal.   Nursing note and vitals reviewed.        MDM  Number of Diagnoses or Management Options  Acute bronchitis, unspecified organism:   Tinea pedis of right foot:   Diagnosis management comments: Pt with likely PNA or bronchitis though will check for myocarditis as well consequence of flu. Discussed smoking cessation. Pt denies cp with walking or exertion to be concern for angina. Sounds like sob is related to coughing. Pt already treated for pertussis by pcp    Rash on foot has been there for months does not look like ulcer or cellulitis; appears fungal       Amount and/or Complexity of Data Reviewed  Clinical lab tests: ordered and reviewed  Tests in the radiology section of CPT®: ordered and reviewed  Review and summarize past medical records: yes (Office visit 1/5 with + flu test)  Independent visualization of images, tracings, or specimens: yes (ekg)    Patient Progress  Patient progress: stable    ED Course       Procedures    EKG interpretation: (Preliminary)  Rhythm: normal sinus rhythm; and regular . Rate (approx.): 80; Axis: normal; P wave: normal; QRS interval: normal ; ST/T wave: non-specific changes     pt with scant wheezes after treatment reports feeling better. Discussed smoking cessation. Has an inhaler will order spacer as well. Short course of steroids for this continued inflammation and cough. Reports he has not been on pain meds since his neck surgery. Will write small amount of robitussin with codeine for night, tessalon perles during the day.  The rash on his foot which has been there for a long time looks fungal. Try clotrimazole and follow up with pcp Detail Level: Detailed

## (undated) DEVICE — INFECTION CONTROL KIT SYS

## (undated) DEVICE — COVER,MAYO STAND,STERILE: Brand: MEDLINE

## (undated) DEVICE — SPONGE: SPECIALTY PEANUT XR 100/CS: Brand: MEDICAL ACTION INDUSTRIES

## (undated) DEVICE — 1200 GUARD II KIT W/5MM TUBE W/O VAC TUBE: Brand: GUARDIAN

## (undated) DEVICE — CODMAN® INTEGRATED BIPOLAR CORD AND TUBING SET FLYING LEADS, ROTARY PUMP: Brand: CODMAN®

## (undated) DEVICE — SYR LR LCK 1ML GRAD NSAF 30ML --

## (undated) DEVICE — SOLUTION IV 250ML 0.9% SOD CHL CLR INJ FLX BG CONT PRT CLSR

## (undated) DEVICE — Device

## (undated) DEVICE — KENDALL SCD EXPRESS SLEEVES, KNEE LENGTH, MEDIUM: Brand: KENDALL SCD

## (undated) DEVICE — COVER,TABLE,HEAVY DUTY,60"X90",STRL: Brand: MEDLINE

## (undated) DEVICE — STERILE POLYISOPRENE POWDER-FREE SURGICAL GLOVES WITH EMOLLIENT COATING: Brand: PROTEXIS

## (undated) DEVICE — DRAPE XR C ARM 41X74IN LF --

## (undated) DEVICE — SCREW EXT FIX L14MM FOR DISTRCTN

## (undated) DEVICE — DRAPE,LAPAROTOMY,T,PEDI,STERILE: Brand: MEDLINE

## (undated) DEVICE — SUTURE MCRYL SZ 3-0 L27IN ABSRB UD L19MM PS-2 3/8 CIR PRIM Y427H

## (undated) DEVICE — GAUZE SPONGES,12 PLY: Brand: CURITY

## (undated) DEVICE — SUTURE VCRL 2-0 L27IN ABSRB UD CP-2 L26MM 1/2 CIR REV CUT J869H

## (undated) DEVICE — BONE WAX WHITE: Brand: BONE WAX WHITE

## (undated) DEVICE — INSULATED BLADE ELECTRODE: Brand: EDGE

## (undated) DEVICE — PREP SKN PREVAIL 40ML APPL --

## (undated) DEVICE — LAMINECTOMY RICHMOND-LF: Brand: MEDLINE INDUSTRIES, INC.

## (undated) DEVICE — SOLUTION IV 1000ML 0.9% SOD CHL

## (undated) DEVICE — NDL SPNE QNCKE 18GX3.5IN LF --

## (undated) DEVICE — DRAIN SURG W7MMXL20CM SIL FULL PERF HUBLESS FLAT RADPQ STRP

## (undated) DEVICE — 16MM DRILL

## (undated) DEVICE — CATH IV AUTOGRD BC GRN 18GA 30 -- INSYTE

## (undated) DEVICE — TOOL 14MH30 LEGEND 14CM 3MM: Brand: MIDAS REX ™